# Patient Record
Sex: FEMALE | Race: WHITE | Employment: FULL TIME | URBAN - METROPOLITAN AREA
[De-identification: names, ages, dates, MRNs, and addresses within clinical notes are randomized per-mention and may not be internally consistent; named-entity substitution may affect disease eponyms.]

---

## 2017-08-10 LAB
HPV COMMENT: NORMAL
HPV TYPE 16: NOT DETECTED
HPV TYPE 18: NOT DETECTED
HPVOH (OTHER TYPES): NOT DETECTED

## 2019-01-07 LAB
ABO/RH: NORMAL
ANTIBODY SCREEN: NORMAL
HEPATITIS C ANTIBODY INTERPRETATION: NORMAL
TSH SERPL DL<=0.05 MIU/L-ACNC: 4.6 UIU/ML (ref 0.27–4.2)

## 2019-01-08 LAB
BASOPHILS ABSOLUTE: 0 K/UL (ref 0–0.2)
BASOPHILS RELATIVE PERCENT: 0.3 %
EOSINOPHILS ABSOLUTE: 0.1 K/UL (ref 0–0.6)
EOSINOPHILS RELATIVE PERCENT: 1.2 %
HCT VFR BLD CALC: 39.1 % (ref 36–48)
HEMOGLOBIN: 13.1 G/DL (ref 12–16)
HEPATITIS B SURFACE ANTIGEN INTERPRETATION: NORMAL
HIV AG/AB: NORMAL
HIV ANTIGEN: NORMAL
HIV-1 ANTIBODY: NORMAL
HIV-2 AB: NORMAL
LYMPHOCYTES ABSOLUTE: 2.7 K/UL (ref 1–5.1)
LYMPHOCYTES RELATIVE PERCENT: 28.7 %
MCH RBC QN AUTO: 31.1 PG (ref 26–34)
MCHC RBC AUTO-ENTMCNC: 33.6 G/DL (ref 31–36)
MCV RBC AUTO: 92.4 FL (ref 80–100)
MONOCYTES ABSOLUTE: 0.6 K/UL (ref 0–1.3)
MONOCYTES RELATIVE PERCENT: 6 %
NEUTROPHILS ABSOLUTE: 6.1 K/UL (ref 1.7–7.7)
NEUTROPHILS RELATIVE PERCENT: 63.8 %
PDW BLD-RTO: 12.9 % (ref 12.4–15.4)
PLATELET # BLD: 406 K/UL (ref 135–450)
PMV BLD AUTO: 8.4 FL (ref 5–10.5)
RBC # BLD: 4.23 M/UL (ref 4–5.2)
RUBELLA ANTIBODY IGG: 93.8 IU/ML
TOTAL SYPHILLIS IGG/IGM: NORMAL
URINE CULTURE, ROUTINE: NORMAL
WBC # BLD: 9.5 K/UL (ref 4–11)

## 2019-01-14 LAB — GONADOTROPIN, CHORIONIC (HCG) QUANT: 94.9 MIU/ML

## 2019-01-16 LAB — GONADOTROPIN, CHORIONIC (HCG) QUANT: 24.7 MIU/ML

## 2019-05-06 LAB
ABO/RH: NORMAL
ANTIBODY SCREEN: NORMAL
HEPATITIS C ANTIBODY INTERPRETATION: NORMAL
TSH SERPL DL<=0.05 MIU/L-ACNC: 2.41 UIU/ML (ref 0.27–4.2)

## 2019-05-07 LAB
BASOPHILS ABSOLUTE: 0 K/UL (ref 0–0.2)
BASOPHILS RELATIVE PERCENT: 0.4 %
EOSINOPHILS ABSOLUTE: 0.1 K/UL (ref 0–0.6)
EOSINOPHILS RELATIVE PERCENT: 0.8 %
HCT VFR BLD CALC: 39.5 % (ref 36–48)
HEMOGLOBIN: 13.2 G/DL (ref 12–16)
HEPATITIS B SURFACE ANTIGEN INTERPRETATION: NORMAL
LYMPHOCYTES ABSOLUTE: 3.8 K/UL (ref 1–5.1)
LYMPHOCYTES RELATIVE PERCENT: 34.7 %
MCH RBC QN AUTO: 30.7 PG (ref 26–34)
MCHC RBC AUTO-ENTMCNC: 33.3 G/DL (ref 31–36)
MCV RBC AUTO: 92 FL (ref 80–100)
MONOCYTES ABSOLUTE: 0.5 K/UL (ref 0–1.3)
MONOCYTES RELATIVE PERCENT: 4.8 %
NEUTROPHILS ABSOLUTE: 6.5 K/UL (ref 1.7–7.7)
NEUTROPHILS RELATIVE PERCENT: 59.3 %
PDW BLD-RTO: 12.6 % (ref 12.4–15.4)
PLATELET # BLD: 375 K/UL (ref 135–450)
PMV BLD AUTO: 9.4 FL (ref 5–10.5)
RBC # BLD: 4.3 M/UL (ref 4–5.2)
RUBELLA ANTIBODY IGG: 87.1 IU/ML
TOTAL SYPHILLIS IGG/IGM: NORMAL
WBC # BLD: 10.9 K/UL (ref 4–11)

## 2019-05-08 LAB — URINE CULTURE, ROUTINE: NORMAL

## 2019-05-09 LAB — MISCELLANEOUS LAB TEST ORDER: NORMAL

## 2019-06-13 LAB — GLUCOSE CHALLENGE: 158 MG/DL

## 2019-06-20 ENCOUNTER — HOSPITAL ENCOUNTER (OUTPATIENT)
Dept: OTHER | Age: 34
Discharge: HOME | End: 2019-06-20
Payer: COMMERCIAL

## 2019-06-20 LAB
GLUCOSE FASTING: 101 MG/DL
GLUCOSE TOLERANCE TEST 1 HOUR: 177 MG/DL
GLUCOSE TOLERANCE TEST 2 HOUR: 110 MG/DL
GLUCOSE TOLERANCE TEST 3 HOUR: 111 MG/DL

## 2019-06-20 PROCEDURE — 82951 GLUCOSE TOLERANCE TEST (GTT): CPT

## 2019-06-20 PROCEDURE — 36415 COLL VENOUS BLD VENIPUNCTURE: CPT

## 2019-06-20 PROCEDURE — 82952 GTT-ADDED SAMPLES: CPT

## 2019-06-27 ENCOUNTER — HOSPITAL ENCOUNTER (OUTPATIENT)
Dept: DIABETES SERVICES | Age: 34
Setting detail: THERAPIES SERIES
Discharge: HOME | End: 2019-06-27
Payer: COMMERCIAL

## 2019-06-27 VITALS — WEIGHT: 220 LBS | BODY MASS INDEX: 40.24 KG/M2

## 2019-06-27 DIAGNOSIS — O24.419 GESTATIONAL DIABETES MELLITUS (GDM), ANTEPARTUM, GESTATIONAL DIABETES METHOD OF CONTROL UNSPECIFIED: Primary | ICD-10-CM

## 2019-06-27 PROCEDURE — G0109 DIAB MANAGE TRN IND/GROUP: HCPCS | Performed by: DIETITIAN, REGISTERED

## 2019-06-27 ASSESSMENT — PATIENT HEALTH QUESTIONNAIRE - PHQ9
SUM OF ALL RESPONSES TO PHQ QUESTIONS 1-9: 0
1. LITTLE INTEREST OR PLEASURE IN DOING THINGS: 0
SUM OF ALL RESPONSES TO PHQ QUESTIONS 1-9: 0
SUM OF ALL RESPONSES TO PHQ9 QUESTIONS 1 & 2: 0
2. FEELING DOWN, DEPRESSED OR HOPELESS: 0

## 2019-07-09 LAB — TSH SERPL DL<=0.05 MIU/L-ACNC: 2.22 UIU/ML (ref 0.27–4.2)

## 2019-08-08 LAB
HCT VFR BLD CALC: 36.9 % (ref 36–48)
HEMOGLOBIN: 12.5 G/DL (ref 12–16)
MCH RBC QN AUTO: 31.2 PG (ref 26–34)
MCHC RBC AUTO-ENTMCNC: 33.9 G/DL (ref 31–36)
MCV RBC AUTO: 92.3 FL (ref 80–100)
PDW BLD-RTO: 12.8 % (ref 12.4–15.4)
PLATELET # BLD: 322 K/UL (ref 135–450)
PMV BLD AUTO: 9.4 FL (ref 5–10.5)
RBC # BLD: 4 M/UL (ref 4–5.2)
TSH SERPL DL<=0.05 MIU/L-ACNC: 2.27 UIU/ML (ref 0.27–4.2)
WBC # BLD: 10.9 K/UL (ref 4–11)

## 2019-08-09 LAB
ESTIMATED AVERAGE GLUCOSE: 102.5 MG/DL
HBA1C MFR BLD: 5.2 %

## 2019-09-14 ENCOUNTER — HOSPITAL ENCOUNTER (OUTPATIENT)
Dept: OBGYN | Age: 34
Discharge: HOME | End: 2019-09-14

## 2019-09-14 PROCEDURE — S9442 BIRTHING CLASS: HCPCS

## 2019-09-17 ENCOUNTER — HOSPITAL ENCOUNTER (OUTPATIENT)
Dept: OBGYN | Age: 34
Discharge: HOME | End: 2019-09-17

## 2019-09-17 PROCEDURE — S9444 PARENTING CLASS: HCPCS

## 2019-10-01 ENCOUNTER — HOSPITAL ENCOUNTER (OUTPATIENT)
Dept: OBGYN | Age: 34
Discharge: HOME | End: 2019-10-01

## 2019-10-01 PROCEDURE — S9444 PARENTING CLASS: HCPCS

## 2019-10-10 ENCOUNTER — HOSPITAL ENCOUNTER (OUTPATIENT)
Age: 34
Discharge: HOME | End: 2019-10-10
Attending: OBSTETRICS & GYNECOLOGY | Admitting: OBSTETRICS & GYNECOLOGY
Payer: COMMERCIAL

## 2019-10-10 VITALS
HEIGHT: 63 IN | DIASTOLIC BLOOD PRESSURE: 51 MMHG | TEMPERATURE: 97.3 F | SYSTOLIC BLOOD PRESSURE: 89 MMHG | WEIGHT: 243 LBS | OXYGEN SATURATION: 97 % | BODY MASS INDEX: 43.05 KG/M2 | HEART RATE: 83 BPM | RESPIRATION RATE: 18 BRPM

## 2019-10-10 LAB
A/G RATIO: 1.2 (ref 1.1–2.2)
ALBUMIN SERPL-MCNC: 3.7 G/DL (ref 3.4–5)
ALP BLD-CCNC: 97 U/L (ref 40–129)
ALT SERPL-CCNC: 11 U/L (ref 10–40)
ANION GAP SERPL CALCULATED.3IONS-SCNC: 10 MMOL/L (ref 3–16)
AST SERPL-CCNC: 13 U/L (ref 15–37)
BACTERIA: ABNORMAL /HPF
BASOPHILS ABSOLUTE: 0.1 K/UL (ref 0–0.2)
BASOPHILS RELATIVE PERCENT: 0.7 %
BILIRUB SERPL-MCNC: <0.2 MG/DL (ref 0–1)
BILIRUBIN DIRECT: <0.2 MG/DL (ref 0–0.3)
BILIRUBIN URINE: NEGATIVE
BILIRUBIN, INDIRECT: NORMAL MG/DL (ref 0–1)
BLOOD, URINE: NEGATIVE
BUN BLDV-MCNC: 8 MG/DL (ref 7–20)
CALCIUM SERPL-MCNC: 9 MG/DL (ref 8.3–10.6)
CHLORIDE BLD-SCNC: 105 MMOL/L (ref 99–110)
CLARITY: CLEAR
CO2: 23 MMOL/L (ref 21–32)
COLOR: YELLOW
CREAT SERPL-MCNC: <0.5 MG/DL (ref 0.6–1.1)
EOSINOPHILS ABSOLUTE: 0.2 K/UL (ref 0–0.6)
EOSINOPHILS RELATIVE PERCENT: 0.9 %
EPITHELIAL CELLS, UA: 2 /HPF (ref 0–5)
GFR AFRICAN AMERICAN: >60
GFR NON-AFRICAN AMERICAN: >60
GLOBULIN: 3.2 G/DL
GLUCOSE BLD-MCNC: 76 MG/DL (ref 70–99)
GLUCOSE URINE: NEGATIVE MG/DL
HCT VFR BLD CALC: 35.4 % (ref 36–48)
HEMOGLOBIN: 12 G/DL (ref 12–16)
HYALINE CASTS: 0 /LPF (ref 0–8)
KETONES, URINE: NEGATIVE MG/DL
LEUKOCYTE ESTERASE, URINE: ABNORMAL
LYMPHOCYTES ABSOLUTE: 3.3 K/UL (ref 1–5.1)
LYMPHOCYTES RELATIVE PERCENT: 19.6 %
MCH RBC QN AUTO: 30.5 PG (ref 26–34)
MCHC RBC AUTO-ENTMCNC: 33.8 G/DL (ref 31–36)
MCV RBC AUTO: 90 FL (ref 80–100)
MICROSCOPIC EXAMINATION: YES
MONOCYTES ABSOLUTE: 0.9 K/UL (ref 0–1.3)
MONOCYTES RELATIVE PERCENT: 5.5 %
NEUTROPHILS ABSOLUTE: 12.2 K/UL (ref 1.7–7.7)
NEUTROPHILS RELATIVE PERCENT: 73.3 %
NITRITE, URINE: NEGATIVE
PDW BLD-RTO: 13.4 % (ref 12.4–15.4)
PH UA: 7 (ref 5–8)
PLATELET # BLD: 324 K/UL (ref 135–450)
PMV BLD AUTO: 8.8 FL (ref 5–10.5)
POTASSIUM SERPL-SCNC: 4 MMOL/L (ref 3.5–5.1)
PROTEIN UA: NEGATIVE MG/DL
RBC # BLD: 3.93 M/UL (ref 4–5.2)
RBC UA: 4 /HPF (ref 0–4)
SODIUM BLD-SCNC: 138 MMOL/L (ref 136–145)
SPECIFIC GRAVITY UA: 1.01 (ref 1–1.03)
TOTAL PROTEIN: 6.9 G/DL (ref 6.4–8.2)
URIC ACID, SERUM: 4.4 MG/DL (ref 2.6–6)
URINE TYPE: 3
UROBILINOGEN, URINE: 0.2 E.U./DL
WBC # BLD: 16.7 K/UL (ref 4–11)
WBC UA: 4 /HPF (ref 0–5)

## 2019-10-10 PROCEDURE — 99211 OFF/OP EST MAY X REQ PHY/QHP: CPT

## 2019-10-10 PROCEDURE — 82248 BILIRUBIN DIRECT: CPT

## 2019-10-10 PROCEDURE — 59025 FETAL NON-STRESS TEST: CPT

## 2019-10-10 PROCEDURE — 80053 COMPREHEN METABOLIC PANEL: CPT

## 2019-10-10 PROCEDURE — 84550 ASSAY OF BLOOD/URIC ACID: CPT

## 2019-10-10 PROCEDURE — 85025 COMPLETE CBC W/AUTO DIFF WBC: CPT

## 2019-10-10 PROCEDURE — 81001 URINALYSIS AUTO W/SCOPE: CPT

## 2019-10-22 ENCOUNTER — HOSPITAL ENCOUNTER (OUTPATIENT)
Dept: OBGYN | Age: 34
Discharge: HOME | End: 2019-10-22

## 2019-10-22 PROCEDURE — S9447 INFANT SAFETY CLASS: HCPCS

## 2019-10-29 LAB — TSH SERPL DL<=0.05 MIU/L-ACNC: 1.38 UIU/ML (ref 0.27–4.2)

## 2019-12-04 LAB — GBS, EXTERNAL RESULT: POSITIVE

## 2019-12-19 ENCOUNTER — ANESTHESIA (OUTPATIENT)
Dept: LABOR AND DELIVERY | Age: 34
DRG: 833 | End: 2019-12-19
Payer: COMMERCIAL

## 2019-12-19 ENCOUNTER — ANESTHESIA EVENT (OUTPATIENT)
Dept: LABOR AND DELIVERY | Age: 34
DRG: 833 | End: 2019-12-19
Payer: COMMERCIAL

## 2019-12-19 ENCOUNTER — HOSPITAL ENCOUNTER (INPATIENT)
Age: 34
LOS: 1 days | Discharge: HOME | DRG: 833 | End: 2019-12-20
Attending: OBSTETRICS & GYNECOLOGY | Admitting: OBSTETRICS & GYNECOLOGY
Payer: COMMERCIAL

## 2019-12-19 PROBLEM — Z3A.39 39 WEEKS GESTATION OF PREGNANCY: Status: ACTIVE | Noted: 2019-12-19

## 2019-12-19 LAB
ABO/RH: NORMAL
AMPHETAMINE SCREEN, URINE: NORMAL
ANION GAP SERPL CALCULATED.3IONS-SCNC: 14 MMOL/L (ref 3–16)
ANTIBODY SCREEN: NORMAL
BARBITURATE SCREEN URINE: NORMAL
BASOPHILS ABSOLUTE: 0.1 K/UL (ref 0–0.2)
BASOPHILS RELATIVE PERCENT: 0.4 %
BENZODIAZEPINE SCREEN, URINE: NORMAL
BUN BLDV-MCNC: 9 MG/DL (ref 7–20)
BUPRENORPHINE URINE: NORMAL
CALCIUM SERPL-MCNC: 8.9 MG/DL (ref 8.3–10.6)
CANNABINOID SCREEN URINE: NORMAL
CHLORIDE BLD-SCNC: 104 MMOL/L (ref 99–110)
CO2: 20 MMOL/L (ref 21–32)
COCAINE METABOLITE SCREEN URINE: NORMAL
CREAT SERPL-MCNC: 0.6 MG/DL (ref 0.6–1.1)
EOSINOPHILS ABSOLUTE: 0.1 K/UL (ref 0–0.6)
EOSINOPHILS RELATIVE PERCENT: 0.8 %
GFR AFRICAN AMERICAN: >60
GFR NON-AFRICAN AMERICAN: >60
GLUCOSE BLD-MCNC: 126 MG/DL (ref 70–99)
HCT VFR BLD CALC: 35.4 % (ref 36–48)
HEMOGLOBIN: 11.8 G/DL (ref 12–16)
LYMPHOCYTES ABSOLUTE: 3.3 K/UL (ref 1–5.1)
LYMPHOCYTES RELATIVE PERCENT: 23 %
Lab: NORMAL
MCH RBC QN AUTO: 30.4 PG (ref 26–34)
MCHC RBC AUTO-ENTMCNC: 33.5 G/DL (ref 31–36)
MCV RBC AUTO: 90.9 FL (ref 80–100)
METHADONE SCREEN, URINE: NORMAL
MONOCYTES ABSOLUTE: 0.6 K/UL (ref 0–1.3)
MONOCYTES RELATIVE PERCENT: 4.4 %
NEUTROPHILS ABSOLUTE: 10.2 K/UL (ref 1.7–7.7)
NEUTROPHILS RELATIVE PERCENT: 71.4 %
OPIATE SCREEN URINE: NORMAL
OXYCODONE URINE: NORMAL
PDW BLD-RTO: 13.8 % (ref 12.4–15.4)
PH UA: 5
PHENCYCLIDINE SCREEN URINE: NORMAL
PLATELET # BLD: 301 K/UL (ref 135–450)
PMV BLD AUTO: 10.4 FL (ref 5–10.5)
POTASSIUM SERPL-SCNC: 3.8 MMOL/L (ref 3.5–5.1)
PROPOXYPHENE SCREEN: NORMAL
RBC # BLD: 3.89 M/UL (ref 4–5.2)
SODIUM BLD-SCNC: 138 MMOL/L (ref 136–145)
WBC # BLD: 14.2 K/UL (ref 4–11)

## 2019-12-19 PROCEDURE — 86901 BLOOD TYPING SEROLOGIC RH(D): CPT

## 2019-12-19 PROCEDURE — 6370000000 HC RX 637 (ALT 250 FOR IP): Performed by: OBSTETRICS & GYNECOLOGY

## 2019-12-19 PROCEDURE — 86850 RBC ANTIBODY SCREEN: CPT

## 2019-12-19 PROCEDURE — 85025 COMPLETE CBC W/AUTO DIFF WBC: CPT

## 2019-12-19 PROCEDURE — G0378 HOSPITAL OBSERVATION PER HR: HCPCS

## 2019-12-19 PROCEDURE — 3700000025 EPIDURAL BLOCK: Performed by: FAMILY MEDICINE

## 2019-12-19 PROCEDURE — 86780 TREPONEMA PALLIDUM: CPT

## 2019-12-19 PROCEDURE — 3E0P7VZ INTRODUCTION OF HORMONE INTO FEMALE REPRODUCTIVE, VIA NATURAL OR ARTIFICIAL OPENING: ICD-10-PCS | Performed by: OBSTETRICS & GYNECOLOGY

## 2019-12-19 PROCEDURE — 80048 BASIC METABOLIC PNL TOTAL CA: CPT

## 2019-12-19 PROCEDURE — 2580000003 HC RX 258: Performed by: OBSTETRICS & GYNECOLOGY

## 2019-12-19 PROCEDURE — 86900 BLOOD TYPING SEROLOGIC ABO: CPT

## 2019-12-19 PROCEDURE — 1220000000 HC SEMI PRIVATE OB R&B

## 2019-12-19 PROCEDURE — 80307 DRUG TEST PRSMV CHEM ANLYZR: CPT

## 2019-12-19 RX ORDER — SODIUM CHLORIDE, SODIUM LACTATE, POTASSIUM CHLORIDE, CALCIUM CHLORIDE 600; 310; 30; 20 MG/100ML; MG/100ML; MG/100ML; MG/100ML
INJECTION, SOLUTION INTRAVENOUS CONTINUOUS
Status: DISCONTINUED | OUTPATIENT
Start: 2019-12-19 | End: 2019-12-20

## 2019-12-19 RX ORDER — ACETAMINOPHEN 325 MG/1
650 TABLET ORAL EVERY 4 HOURS PRN
Status: DISCONTINUED | OUTPATIENT
Start: 2019-12-19 | End: 2019-12-21 | Stop reason: HOSPADM

## 2019-12-19 RX ORDER — ONDANSETRON 2 MG/ML
4 INJECTION INTRAMUSCULAR; INTRAVENOUS EVERY 6 HOURS PRN
Status: DISCONTINUED | OUTPATIENT
Start: 2019-12-19 | End: 2019-12-21 | Stop reason: HOSPADM

## 2019-12-19 RX ORDER — DOCUSATE SODIUM 100 MG/1
100 CAPSULE, LIQUID FILLED ORAL 2 TIMES DAILY
Status: DISCONTINUED | OUTPATIENT
Start: 2019-12-19 | End: 2019-12-21 | Stop reason: HOSPADM

## 2019-12-19 RX ORDER — SODIUM CHLORIDE 0.9 % (FLUSH) 0.9 %
10 SYRINGE (ML) INJECTION PRN
Status: DISCONTINUED | OUTPATIENT
Start: 2019-12-19 | End: 2019-12-21 | Stop reason: HOSPADM

## 2019-12-19 RX ORDER — SODIUM CHLORIDE 0.9 % (FLUSH) 0.9 %
10 SYRINGE (ML) INJECTION EVERY 12 HOURS SCHEDULED
Status: DISCONTINUED | OUTPATIENT
Start: 2019-12-19 | End: 2019-12-21 | Stop reason: HOSPADM

## 2019-12-19 RX ADMIN — SODIUM CHLORIDE, POTASSIUM CHLORIDE, SODIUM LACTATE AND CALCIUM CHLORIDE: 600; 310; 30; 20 INJECTION, SOLUTION INTRAVENOUS at 21:00

## 2019-12-19 RX ADMIN — Medication 25 MCG: at 21:55

## 2019-12-20 VITALS
DIASTOLIC BLOOD PRESSURE: 56 MMHG | TEMPERATURE: 98.4 F | HEIGHT: 63 IN | BODY MASS INDEX: 46.42 KG/M2 | SYSTOLIC BLOOD PRESSURE: 113 MMHG | HEART RATE: 87 BPM | RESPIRATION RATE: 20 BRPM | WEIGHT: 262 LBS

## 2019-12-20 LAB
GLUCOSE BLD-MCNC: 103 MG/DL (ref 70–99)
GLUCOSE BLD-MCNC: 78 MG/DL (ref 70–99)
GLUCOSE BLD-MCNC: 90 MG/DL (ref 70–99)
PERFORMED ON: ABNORMAL
PERFORMED ON: NORMAL
PERFORMED ON: NORMAL
TOTAL SYPHILLIS IGG/IGM: NORMAL

## 2019-12-20 PROCEDURE — 6370000000 HC RX 637 (ALT 250 FOR IP): Performed by: OBSTETRICS & GYNECOLOGY

## 2019-12-20 PROCEDURE — G0378 HOSPITAL OBSERVATION PER HR: HCPCS

## 2019-12-20 PROCEDURE — 6360000002 HC RX W HCPCS: Performed by: OBSTETRICS & GYNECOLOGY

## 2019-12-20 PROCEDURE — 96365 THER/PROPH/DIAG IV INF INIT: CPT

## 2019-12-20 PROCEDURE — 96366 THER/PROPH/DIAG IV INF ADDON: CPT

## 2019-12-20 PROCEDURE — 2580000003 HC RX 258: Performed by: OBSTETRICS & GYNECOLOGY

## 2019-12-20 RX ORDER — SODIUM CHLORIDE 9 MG/ML
INJECTION, SOLUTION INTRAVENOUS CONTINUOUS
Status: DISCONTINUED | OUTPATIENT
Start: 2019-12-20 | End: 2019-12-21 | Stop reason: HOSPADM

## 2019-12-20 RX ORDER — NALBUPHINE HCL 10 MG/ML
10 AMPUL (ML) INJECTION
Status: DISCONTINUED | OUTPATIENT
Start: 2019-12-20 | End: 2019-12-21 | Stop reason: HOSPADM

## 2019-12-20 RX ADMIN — SODIUM CHLORIDE: 9 INJECTION, SOLUTION INTRAVENOUS at 10:36

## 2019-12-20 RX ADMIN — Medication 25 MCG: at 03:03

## 2019-12-20 RX ADMIN — NALBUPHINE HYDROCHLORIDE 10 MG: 10 INJECTION, SOLUTION INTRAMUSCULAR; INTRAVENOUS; SUBCUTANEOUS at 01:43

## 2019-12-20 RX ADMIN — Medication 50 MCG: at 09:20

## 2019-12-20 RX ADMIN — DEXTROSE MONOHYDRATE 2.5 MILLION UNITS: 50 INJECTION, SOLUTION INTRAVENOUS at 17:59

## 2019-12-20 RX ADMIN — DEXTROSE MONOHYDRATE 2.5 MILLION UNITS: 50 INJECTION, SOLUTION INTRAVENOUS at 06:59

## 2019-12-20 RX ADMIN — DEXTROSE MONOHYDRATE 5 MILLION UNITS: 5 INJECTION INTRAVENOUS at 03:14

## 2019-12-20 RX ADMIN — DEXTROSE MONOHYDRATE 2.5 MILLION UNITS: 50 INJECTION, SOLUTION INTRAVENOUS at 13:30

## 2019-12-20 RX ADMIN — SODIUM CHLORIDE: 9 INJECTION, SOLUTION INTRAVENOUS at 01:21

## 2019-12-20 RX ADMIN — Medication 50 MCG: at 14:36

## 2019-12-20 RX ADMIN — ACETAMINOPHEN 650 MG: 325 TABLET, FILM COATED ORAL at 00:49

## 2019-12-20 ASSESSMENT — PAIN SCALES - GENERAL
PAINLEVEL_OUTOF10: 6
PAINLEVEL_OUTOF10: 3

## 2019-12-20 ASSESSMENT — PAIN DESCRIPTION - DESCRIPTORS
DESCRIPTORS: CRAMPING
DESCRIPTORS: CRAMPING;STABBING
DESCRIPTORS: CRAMPING

## 2019-12-21 ENCOUNTER — HOSPITAL ENCOUNTER (INPATIENT)
Age: 34
LOS: 2 days | Discharge: HOME | End: 2019-12-23
Attending: OBSTETRICS & GYNECOLOGY | Admitting: OBSTETRICS & GYNECOLOGY
Payer: COMMERCIAL

## 2019-12-21 DIAGNOSIS — Z37.9 NORMAL LABOR: Primary | ICD-10-CM

## 2019-12-21 LAB
AMPHETAMINE SCREEN, URINE: NORMAL
BARBITURATE SCREEN URINE: NORMAL
BENZODIAZEPINE SCREEN, URINE: NORMAL
BUPRENORPHINE URINE: NORMAL
CANNABINOID SCREEN URINE: NORMAL
COCAINE METABOLITE SCREEN URINE: NORMAL
GLUCOSE BLD-MCNC: 102 MG/DL (ref 70–99)
GLUCOSE BLD-MCNC: 86 MG/DL (ref 70–99)
GLUCOSE BLD-MCNC: 90 MG/DL (ref 70–99)
GLUCOSE BLD-MCNC: 90 MG/DL (ref 70–99)
GLUCOSE BLD-MCNC: 92 MG/DL (ref 70–99)
HCT VFR BLD CALC: 37.3 % (ref 36–48)
HEMOGLOBIN: 12.6 G/DL (ref 12–16)
Lab: NORMAL
MCH RBC QN AUTO: 30.5 PG (ref 26–34)
MCHC RBC AUTO-ENTMCNC: 33.8 G/DL (ref 31–36)
MCV RBC AUTO: 90.1 FL (ref 80–100)
METHADONE SCREEN, URINE: NORMAL
OPIATE SCREEN URINE: NORMAL
OXYCODONE URINE: NORMAL
PDW BLD-RTO: 13.6 % (ref 12.4–15.4)
PERFORMED ON: ABNORMAL
PERFORMED ON: NORMAL
PH UA: 5
PHENCYCLIDINE SCREEN URINE: NORMAL
PLATELET # BLD: 321 K/UL (ref 135–450)
PMV BLD AUTO: 10 FL (ref 5–10.5)
PROPOXYPHENE SCREEN: NORMAL
RBC # BLD: 4.14 M/UL (ref 4–5.2)
SPECIMEN STATUS: NORMAL
TOTAL SYPHILLIS IGG/IGM: NORMAL
WBC # BLD: 19 K/UL (ref 4–11)

## 2019-12-21 PROCEDURE — 1220000000 HC SEMI PRIVATE OB R&B

## 2019-12-21 PROCEDURE — 86780 TREPONEMA PALLIDUM: CPT

## 2019-12-21 PROCEDURE — 3700000025 EPIDURAL BLOCK: Performed by: FAMILY MEDICINE

## 2019-12-21 PROCEDURE — 2500000003 HC RX 250 WO HCPCS: Performed by: NURSE ANESTHETIST, CERTIFIED REGISTERED

## 2019-12-21 PROCEDURE — 2580000003 HC RX 258: Performed by: OBSTETRICS & GYNECOLOGY

## 2019-12-21 PROCEDURE — 80307 DRUG TEST PRSMV CHEM ANLYZR: CPT

## 2019-12-21 PROCEDURE — 6370000000 HC RX 637 (ALT 250 FOR IP): Performed by: OBSTETRICS & GYNECOLOGY

## 2019-12-21 PROCEDURE — 6360000002 HC RX W HCPCS: Performed by: OBSTETRICS & GYNECOLOGY

## 2019-12-21 PROCEDURE — 85027 COMPLETE CBC AUTOMATED: CPT

## 2019-12-21 RX ORDER — DOCUSATE SODIUM 100 MG/1
100 CAPSULE, LIQUID FILLED ORAL 2 TIMES DAILY
Status: DISCONTINUED | OUTPATIENT
Start: 2019-12-21 | End: 2019-12-22

## 2019-12-21 RX ORDER — ONDANSETRON 2 MG/ML
4 INJECTION INTRAMUSCULAR; INTRAVENOUS EVERY 6 HOURS PRN
Status: DISCONTINUED | OUTPATIENT
Start: 2019-12-21 | End: 2019-12-22

## 2019-12-21 RX ORDER — LIDOCAINE HYDROCHLORIDE 10 MG/ML
INJECTION, SOLUTION EPIDURAL; INFILTRATION; INTRACAUDAL; PERINEURAL PRN
Status: DISCONTINUED | OUTPATIENT
Start: 2019-12-21 | End: 2019-12-22 | Stop reason: SDUPTHER

## 2019-12-21 RX ORDER — LIDOCAINE HYDROCHLORIDE AND EPINEPHRINE 20; 5 MG/ML; UG/ML
INJECTION, SOLUTION EPIDURAL; INFILTRATION; INTRACAUDAL; PERINEURAL PRN
Status: DISCONTINUED | OUTPATIENT
Start: 2019-12-21 | End: 2019-12-22 | Stop reason: SDUPTHER

## 2019-12-21 RX ORDER — SODIUM CHLORIDE, SODIUM LACTATE, POTASSIUM CHLORIDE, CALCIUM CHLORIDE 600; 310; 30; 20 MG/100ML; MG/100ML; MG/100ML; MG/100ML
INJECTION, SOLUTION INTRAVENOUS CONTINUOUS
Status: DISCONTINUED | OUTPATIENT
Start: 2019-12-21 | End: 2019-12-22

## 2019-12-21 RX ORDER — LIDOCAINE HYDROCHLORIDE 20 MG/ML
JELLY TOPICAL PRN
Status: DISCONTINUED | OUTPATIENT
Start: 2019-12-21 | End: 2019-12-22

## 2019-12-21 RX ORDER — ACETAMINOPHEN 325 MG/1
650 TABLET ORAL EVERY 4 HOURS PRN
Status: DISCONTINUED | OUTPATIENT
Start: 2019-12-21 | End: 2019-12-22

## 2019-12-21 RX ADMIN — DEXTROSE MONOHYDRATE 2.5 MILLION UNITS: 50 INJECTION, SOLUTION INTRAVENOUS at 14:50

## 2019-12-21 RX ADMIN — LIDOCAINE HYDROCHLORIDE AND EPINEPHRINE 3 ML: 20; 5 INJECTION, SOLUTION EPIDURAL; INFILTRATION; INTRACAUDAL; PERINEURAL at 01:17

## 2019-12-21 RX ADMIN — DEXTROSE MONOHYDRATE 2.5 MILLION UNITS: 50 INJECTION, SOLUTION INTRAVENOUS at 18:33

## 2019-12-21 RX ADMIN — LIDOCAINE HYDROCHLORIDE 3 ML: 10 INJECTION, SOLUTION EPIDURAL; INFILTRATION; INTRACAUDAL; PERINEURAL at 01:17

## 2019-12-21 RX ADMIN — DEXTROSE MONOHYDRATE 2.5 MILLION UNITS: 50 INJECTION, SOLUTION INTRAVENOUS at 23:19

## 2019-12-21 RX ADMIN — Medication 12 ML/HR: at 01:17

## 2019-12-21 RX ADMIN — ACETAMINOPHEN 650 MG: 325 TABLET, FILM COATED ORAL at 05:01

## 2019-12-21 RX ADMIN — ONDANSETRON 4 MG: 2 INJECTION INTRAMUSCULAR; INTRAVENOUS at 03:27

## 2019-12-21 RX ADMIN — LIDOCAINE HYDROCHLORIDE: 20 JELLY TOPICAL at 03:28

## 2019-12-21 RX ADMIN — SODIUM CHLORIDE, POTASSIUM CHLORIDE, SODIUM LACTATE AND CALCIUM CHLORIDE: 600; 310; 30; 20 INJECTION, SOLUTION INTRAVENOUS at 14:51

## 2019-12-21 RX ADMIN — SODIUM CHLORIDE, POTASSIUM CHLORIDE, SODIUM LACTATE AND CALCIUM CHLORIDE: 600; 310; 30; 20 INJECTION, SOLUTION INTRAVENOUS at 03:28

## 2019-12-21 RX ADMIN — DEXTROSE MONOHYDRATE 2.5 MILLION UNITS: 50 INJECTION, SOLUTION INTRAVENOUS at 06:44

## 2019-12-21 RX ADMIN — SODIUM CHLORIDE, POTASSIUM CHLORIDE, SODIUM LACTATE AND CALCIUM CHLORIDE: 600; 310; 30; 20 INJECTION, SOLUTION INTRAVENOUS at 05:02

## 2019-12-21 RX ADMIN — Medication 12 ML/HR: at 19:06

## 2019-12-21 RX ADMIN — Medication 1 MILLI-UNITS/MIN: at 09:46

## 2019-12-21 RX ADMIN — DEXTROSE MONOHYDRATE 2.5 MILLION UNITS: 50 INJECTION, SOLUTION INTRAVENOUS at 10:43

## 2019-12-21 ASSESSMENT — PAIN SCALES - GENERAL: PAINLEVEL_OUTOF10: 3

## 2019-12-22 PROBLEM — Z37.9 NORMAL LABOR: Status: ACTIVE | Noted: 2019-12-22

## 2019-12-22 PROCEDURE — 7200000001 HC VAGINAL DELIVERY

## 2019-12-22 PROCEDURE — 2580000003 HC RX 258: Performed by: OBSTETRICS & GYNECOLOGY

## 2019-12-22 PROCEDURE — 6370000000 HC RX 637 (ALT 250 FOR IP)

## 2019-12-22 PROCEDURE — 1220000000 HC SEMI PRIVATE OB R&B

## 2019-12-22 PROCEDURE — 6370000000 HC RX 637 (ALT 250 FOR IP): Performed by: OBSTETRICS & GYNECOLOGY

## 2019-12-22 PROCEDURE — 0KQM0ZZ REPAIR PERINEUM MUSCLE, OPEN APPROACH: ICD-10-PCS | Performed by: OBSTETRICS & GYNECOLOGY

## 2019-12-22 PROCEDURE — 6360000002 HC RX W HCPCS: Performed by: OBSTETRICS & GYNECOLOGY

## 2019-12-22 RX ORDER — SODIUM CHLORIDE 0.9 % (FLUSH) 0.9 %
10 SYRINGE (ML) INJECTION PRN
Status: DISCONTINUED | OUTPATIENT
Start: 2019-12-22 | End: 2019-12-23 | Stop reason: HOSPADM

## 2019-12-22 RX ORDER — OXYCODONE HYDROCHLORIDE AND ACETAMINOPHEN 5; 325 MG/1; MG/1
2 TABLET ORAL EVERY 4 HOURS PRN
Status: DISCONTINUED | OUTPATIENT
Start: 2019-12-22 | End: 2019-12-23 | Stop reason: HOSPADM

## 2019-12-22 RX ORDER — OXYCODONE HYDROCHLORIDE AND ACETAMINOPHEN 5; 325 MG/1; MG/1
1 TABLET ORAL EVERY 6 HOURS PRN
Qty: 20 TABLET | Refills: 0 | Status: SHIPPED | OUTPATIENT
Start: 2019-12-22 | End: 2019-12-27

## 2019-12-22 RX ORDER — DOCUSATE SODIUM 100 MG/1
100 CAPSULE, LIQUID FILLED ORAL 2 TIMES DAILY
Status: DISCONTINUED | OUTPATIENT
Start: 2019-12-22 | End: 2019-12-23 | Stop reason: HOSPADM

## 2019-12-22 RX ORDER — MISOPROSTOL 100 UG/1
TABLET ORAL
Status: COMPLETED
Start: 2019-12-22 | End: 2019-12-22

## 2019-12-22 RX ORDER — IBUPROFEN 800 MG/1
800 TABLET ORAL EVERY 8 HOURS
Status: DISCONTINUED | OUTPATIENT
Start: 2019-12-22 | End: 2019-12-23 | Stop reason: HOSPADM

## 2019-12-22 RX ORDER — FUROSEMIDE 10 MG/ML
20 INJECTION INTRAMUSCULAR; INTRAVENOUS ONCE
Status: DISCONTINUED | OUTPATIENT
Start: 2019-12-22 | End: 2019-12-23 | Stop reason: HOSPADM

## 2019-12-22 RX ORDER — SODIUM CHLORIDE 0.9 % (FLUSH) 0.9 %
10 SYRINGE (ML) INJECTION EVERY 12 HOURS SCHEDULED
Status: DISCONTINUED | OUTPATIENT
Start: 2019-12-22 | End: 2019-12-23 | Stop reason: HOSPADM

## 2019-12-22 RX ORDER — ACETAMINOPHEN 325 MG/1
650 TABLET ORAL EVERY 4 HOURS PRN
Status: DISCONTINUED | OUTPATIENT
Start: 2019-12-22 | End: 2019-12-23 | Stop reason: HOSPADM

## 2019-12-22 RX ORDER — LEVOTHYROXINE SODIUM 0.03 MG/1
50 TABLET ORAL DAILY
Status: DISCONTINUED | OUTPATIENT
Start: 2019-12-22 | End: 2019-12-23 | Stop reason: HOSPADM

## 2019-12-22 RX ORDER — LANOLIN 100 %
OINTMENT (GRAM) TOPICAL PRN
Status: DISCONTINUED | OUTPATIENT
Start: 2019-12-22 | End: 2019-12-23 | Stop reason: HOSPADM

## 2019-12-22 RX ORDER — MISOPROSTOL 100 UG/1
800 TABLET ORAL ONCE
Status: COMPLETED | OUTPATIENT
Start: 2019-12-22 | End: 2019-12-22

## 2019-12-22 RX ORDER — FUROSEMIDE 20 MG/1
20 TABLET ORAL ONCE
Status: COMPLETED | OUTPATIENT
Start: 2019-12-22 | End: 2019-12-22

## 2019-12-22 RX ORDER — OXYCODONE HYDROCHLORIDE AND ACETAMINOPHEN 5; 325 MG/1; MG/1
1 TABLET ORAL EVERY 4 HOURS PRN
Status: DISCONTINUED | OUTPATIENT
Start: 2019-12-22 | End: 2019-12-23 | Stop reason: HOSPADM

## 2019-12-22 RX ORDER — IBUPROFEN 600 MG/1
600 TABLET ORAL EVERY 6 HOURS PRN
Qty: 30 TABLET | Refills: 1 | Status: SHIPPED | OUTPATIENT
Start: 2019-12-22 | End: 2020-04-20

## 2019-12-22 RX ADMIN — DOCUSATE SODIUM 100 MG: 100 CAPSULE ORAL at 07:45

## 2019-12-22 RX ADMIN — MISOPROSTOL 800 MCG: 100 TABLET ORAL at 02:40

## 2019-12-22 RX ADMIN — IBUPROFEN 800 MG: 800 TABLET, FILM COATED ORAL at 11:23

## 2019-12-22 RX ADMIN — BENZOCAINE AND LEVOMENTHOL: 200; 5 SPRAY TOPICAL at 03:36

## 2019-12-22 RX ADMIN — SODIUM CHLORIDE, POTASSIUM CHLORIDE, SODIUM LACTATE AND CALCIUM CHLORIDE: 600; 310; 30; 20 INJECTION, SOLUTION INTRAVENOUS at 00:52

## 2019-12-22 RX ADMIN — ACETAMINOPHEN 650 MG: 325 TABLET, FILM COATED ORAL at 09:36

## 2019-12-22 RX ADMIN — OXYCODONE HYDROCHLORIDE AND ACETAMINOPHEN 1 TABLET: 5; 325 TABLET ORAL at 17:48

## 2019-12-22 RX ADMIN — OXYCODONE HYDROCHLORIDE AND ACETAMINOPHEN 1 TABLET: 5; 325 TABLET ORAL at 05:08

## 2019-12-22 RX ADMIN — LEVOTHYROXINE SODIUM 50 MCG: 0.03 TABLET ORAL at 07:45

## 2019-12-22 RX ADMIN — IBUPROFEN 800 MG: 800 TABLET, FILM COATED ORAL at 03:36

## 2019-12-22 RX ADMIN — ACETAMINOPHEN 650 MG: 325 TABLET, FILM COATED ORAL at 13:48

## 2019-12-22 RX ADMIN — FUROSEMIDE 20 MG: 20 TABLET ORAL at 15:23

## 2019-12-22 ASSESSMENT — PAIN SCALES - GENERAL
PAINLEVEL_OUTOF10: 4
PAINLEVEL_OUTOF10: 5
PAINLEVEL_OUTOF10: 8
PAINLEVEL_OUTOF10: 3
PAINLEVEL_OUTOF10: 5
PAINLEVEL_OUTOF10: 7

## 2019-12-23 VITALS
DIASTOLIC BLOOD PRESSURE: 88 MMHG | OXYGEN SATURATION: 100 % | SYSTOLIC BLOOD PRESSURE: 127 MMHG | RESPIRATION RATE: 18 BRPM | TEMPERATURE: 97.1 F | WEIGHT: 263 LBS | BODY MASS INDEX: 48.4 KG/M2 | HEART RATE: 73 BPM | HEIGHT: 62 IN

## 2019-12-23 PROCEDURE — 2500000003 HC RX 250 WO HCPCS

## 2019-12-23 PROCEDURE — 6370000000 HC RX 637 (ALT 250 FOR IP): Performed by: OBSTETRICS & GYNECOLOGY

## 2019-12-23 RX ADMIN — DOCUSATE SODIUM 100 MG: 100 CAPSULE ORAL at 10:05

## 2019-12-23 RX ADMIN — LEVOTHYROXINE SODIUM 50 MCG: 0.03 TABLET ORAL at 07:10

## 2019-12-23 RX ADMIN — BENZOCAINE AND LEVOMENTHOL: 200; 5 SPRAY TOPICAL at 12:38

## 2019-12-23 RX ADMIN — OXYCODONE HYDROCHLORIDE AND ACETAMINOPHEN 1 TABLET: 5; 325 TABLET ORAL at 01:52

## 2019-12-23 RX ADMIN — IBUPROFEN 800 MG: 800 TABLET, FILM COATED ORAL at 10:05

## 2019-12-23 RX ADMIN — IBUPROFEN 800 MG: 800 TABLET, FILM COATED ORAL at 01:51

## 2019-12-23 ASSESSMENT — PAIN SCALES - GENERAL
PAINLEVEL_OUTOF10: 7
PAINLEVEL_OUTOF10: 4

## 2019-12-27 ENCOUNTER — APPOINTMENT (OUTPATIENT)
Dept: ULTRASOUND IMAGING | Age: 34
DRG: 776 | End: 2019-12-27
Payer: COMMERCIAL

## 2019-12-27 ENCOUNTER — HOSPITAL ENCOUNTER (INPATIENT)
Age: 34
LOS: 2 days | Discharge: HOME | DRG: 776 | End: 2019-12-29
Attending: EMERGENCY MEDICINE | Admitting: OBSTETRICS & GYNECOLOGY
Payer: COMMERCIAL

## 2019-12-27 DIAGNOSIS — R10.10 PAIN OF UPPER ABDOMEN: Primary | ICD-10-CM

## 2019-12-27 DIAGNOSIS — R51.9 ACUTE NONINTRACTABLE HEADACHE, UNSPECIFIED HEADACHE TYPE: ICD-10-CM

## 2019-12-27 DIAGNOSIS — R03.0 ELEVATED BLOOD PRESSURE READING: ICD-10-CM

## 2019-12-27 LAB
A/G RATIO: 1.3 (ref 1.1–2.2)
ALBUMIN SERPL-MCNC: 3.5 G/DL (ref 3.4–5)
ALP BLD-CCNC: 113 U/L (ref 40–129)
ALT SERPL-CCNC: 50 U/L (ref 10–40)
ANION GAP SERPL CALCULATED.3IONS-SCNC: 12 MMOL/L (ref 3–16)
AST SERPL-CCNC: 50 U/L (ref 15–37)
BACTERIA: ABNORMAL /HPF
BASOPHILS ABSOLUTE: 0.1 K/UL (ref 0–0.2)
BASOPHILS RELATIVE PERCENT: 0.6 %
BILIRUB SERPL-MCNC: 0.3 MG/DL (ref 0–1)
BILIRUBIN URINE: NEGATIVE
BLOOD, URINE: ABNORMAL
BUN BLDV-MCNC: 13 MG/DL (ref 7–20)
CALCIUM SERPL-MCNC: 8.6 MG/DL (ref 8.3–10.6)
CHLORIDE BLD-SCNC: 106 MMOL/L (ref 99–110)
CLARITY: ABNORMAL
CO2: 23 MMOL/L (ref 21–32)
COLOR: ABNORMAL
CREAT SERPL-MCNC: 0.8 MG/DL (ref 0.6–1.1)
EOSINOPHILS ABSOLUTE: 0.2 K/UL (ref 0–0.6)
EOSINOPHILS RELATIVE PERCENT: 1.4 %
EPITHELIAL CELLS, UA: 2 /HPF (ref 0–5)
GFR AFRICAN AMERICAN: >60
GFR NON-AFRICAN AMERICAN: >60
GLOBULIN: 2.6 G/DL
GLUCOSE BLD-MCNC: 92 MG/DL (ref 70–99)
GLUCOSE URINE: NEGATIVE MG/DL
HCT VFR BLD CALC: 32.5 % (ref 36–48)
HEMOGLOBIN: 10.9 G/DL (ref 12–16)
HYALINE CASTS: 3 /LPF (ref 0–8)
KETONES, URINE: NEGATIVE MG/DL
LEUKOCYTE ESTERASE, URINE: ABNORMAL
LIPASE: 18 U/L (ref 13–60)
LYMPHOCYTES ABSOLUTE: 2.5 K/UL (ref 1–5.1)
LYMPHOCYTES RELATIVE PERCENT: 23.4 %
MCH RBC QN AUTO: 30.7 PG (ref 26–34)
MCHC RBC AUTO-ENTMCNC: 33.5 G/DL (ref 31–36)
MCV RBC AUTO: 91.8 FL (ref 80–100)
MICROSCOPIC EXAMINATION: YES
MONOCYTES ABSOLUTE: 0.5 K/UL (ref 0–1.3)
MONOCYTES RELATIVE PERCENT: 5.1 %
NEUTROPHILS ABSOLUTE: 7.5 K/UL (ref 1.7–7.7)
NEUTROPHILS RELATIVE PERCENT: 69.5 %
NITRITE, URINE: NEGATIVE
PDW BLD-RTO: 13.8 % (ref 12.4–15.4)
PH UA: 6.5 (ref 5–8)
PLATELET # BLD: 297 K/UL (ref 135–450)
PMV BLD AUTO: 9.4 FL (ref 5–10.5)
POTASSIUM SERPL-SCNC: 5 MMOL/L (ref 3.5–5.1)
PROTEIN UA: 30 MG/DL
RBC # BLD: 3.55 M/UL (ref 4–5.2)
RBC UA: 213 /HPF (ref 0–4)
SODIUM BLD-SCNC: 141 MMOL/L (ref 136–145)
SPECIFIC GRAVITY UA: 1.01 (ref 1–1.03)
TOTAL PROTEIN: 6.1 G/DL (ref 6.4–8.2)
URIC ACID, SERUM: 6.5 MG/DL (ref 2.6–6)
URINE TYPE: ABNORMAL
UROBILINOGEN, URINE: 0.2 E.U./DL
WBC # BLD: 10.8 K/UL (ref 4–11)
WBC UA: 59 /HPF (ref 0–5)

## 2019-12-27 PROCEDURE — 6360000002 HC RX W HCPCS: Performed by: OBSTETRICS & GYNECOLOGY

## 2019-12-27 PROCEDURE — 1220000000 HC SEMI PRIVATE OB R&B

## 2019-12-27 PROCEDURE — 84550 ASSAY OF BLOOD/URIC ACID: CPT

## 2019-12-27 PROCEDURE — 96366 THER/PROPH/DIAG IV INF ADDON: CPT

## 2019-12-27 PROCEDURE — G0378 HOSPITAL OBSERVATION PER HR: HCPCS

## 2019-12-27 PROCEDURE — 81001 URINALYSIS AUTO W/SCOPE: CPT

## 2019-12-27 PROCEDURE — 2580000003 HC RX 258: Performed by: OBSTETRICS & GYNECOLOGY

## 2019-12-27 PROCEDURE — 99285 EMERGENCY DEPT VISIT HI MDM: CPT

## 2019-12-27 PROCEDURE — 83690 ASSAY OF LIPASE: CPT

## 2019-12-27 PROCEDURE — 76705 ECHO EXAM OF ABDOMEN: CPT

## 2019-12-27 PROCEDURE — 6370000000 HC RX 637 (ALT 250 FOR IP): Performed by: OBSTETRICS & GYNECOLOGY

## 2019-12-27 PROCEDURE — 80053 COMPREHEN METABOLIC PANEL: CPT

## 2019-12-27 PROCEDURE — 36415 COLL VENOUS BLD VENIPUNCTURE: CPT

## 2019-12-27 PROCEDURE — 85025 COMPLETE CBC W/AUTO DIFF WBC: CPT

## 2019-12-27 PROCEDURE — 96365 THER/PROPH/DIAG IV INF INIT: CPT

## 2019-12-27 RX ORDER — IBUPROFEN 800 MG/1
800 TABLET ORAL EVERY 8 HOURS PRN
Status: DISCONTINUED | OUTPATIENT
Start: 2019-12-27 | End: 2019-12-29 | Stop reason: HOSPADM

## 2019-12-27 RX ORDER — LABETALOL HYDROCHLORIDE 5 MG/ML
40 INJECTION, SOLUTION INTRAVENOUS
Status: ACTIVE | OUTPATIENT
Start: 2019-12-27 | End: 2019-12-27

## 2019-12-27 RX ORDER — DOCUSATE SODIUM 100 MG/1
100 CAPSULE, LIQUID FILLED ORAL 2 TIMES DAILY PRN
Status: DISCONTINUED | OUTPATIENT
Start: 2019-12-27 | End: 2019-12-29 | Stop reason: HOSPADM

## 2019-12-27 RX ORDER — LABETALOL HYDROCHLORIDE 5 MG/ML
80 INJECTION, SOLUTION INTRAVENOUS
Status: ACTIVE | OUTPATIENT
Start: 2019-12-27 | End: 2019-12-27

## 2019-12-27 RX ORDER — MAGNESIUM SULFATE IN WATER 40 MG/ML
4 INJECTION, SOLUTION INTRAVENOUS ONCE
Status: COMPLETED | OUTPATIENT
Start: 2019-12-27 | End: 2019-12-27

## 2019-12-27 RX ORDER — ACETAMINOPHEN 500 MG
1000 TABLET ORAL EVERY 8 HOURS PRN
Status: DISCONTINUED | OUTPATIENT
Start: 2019-12-27 | End: 2019-12-29 | Stop reason: HOSPADM

## 2019-12-27 RX ORDER — SODIUM CHLORIDE 0.9 % (FLUSH) 0.9 %
10 SYRINGE (ML) INJECTION EVERY 12 HOURS SCHEDULED
Status: DISCONTINUED | OUTPATIENT
Start: 2019-12-27 | End: 2019-12-29 | Stop reason: HOSPADM

## 2019-12-27 RX ORDER — SODIUM CHLORIDE, SODIUM LACTATE, POTASSIUM CHLORIDE, CALCIUM CHLORIDE 600; 310; 30; 20 MG/100ML; MG/100ML; MG/100ML; MG/100ML
INJECTION, SOLUTION INTRAVENOUS CONTINUOUS
Status: DISCONTINUED | OUTPATIENT
Start: 2019-12-27 | End: 2019-12-29 | Stop reason: HOSPADM

## 2019-12-27 RX ORDER — SODIUM CHLORIDE 0.9 % (FLUSH) 0.9 %
10 SYRINGE (ML) INJECTION PRN
Status: DISCONTINUED | OUTPATIENT
Start: 2019-12-27 | End: 2019-12-29 | Stop reason: HOSPADM

## 2019-12-27 RX ORDER — CALCIUM GLUCONATE 94 MG/ML
1 INJECTION, SOLUTION INTRAVENOUS PRN
Status: DISCONTINUED | OUTPATIENT
Start: 2019-12-27 | End: 2019-12-29 | Stop reason: HOSPADM

## 2019-12-27 RX ORDER — LABETALOL HYDROCHLORIDE 5 MG/ML
20 INJECTION, SOLUTION INTRAVENOUS
Status: ACTIVE | OUTPATIENT
Start: 2019-12-27 | End: 2019-12-27

## 2019-12-27 RX ORDER — HYDRALAZINE HYDROCHLORIDE 20 MG/ML
10 INJECTION INTRAMUSCULAR; INTRAVENOUS
Status: ACTIVE | OUTPATIENT
Start: 2019-12-27 | End: 2019-12-27

## 2019-12-27 RX ADMIN — MAGNESIUM SULFATE HEPTAHYDRATE 2 G/HR: 40 INJECTION, SOLUTION INTRAVENOUS at 20:07

## 2019-12-27 RX ADMIN — MAGNESIUM SULFATE HEPTAHYDRATE 4 G: 40 INJECTION, SOLUTION INTRAVENOUS at 19:20

## 2019-12-27 RX ADMIN — IBUPROFEN 800 MG: 800 TABLET, FILM COATED ORAL at 20:30

## 2019-12-27 RX ADMIN — SODIUM CHLORIDE, POTASSIUM CHLORIDE, SODIUM LACTATE AND CALCIUM CHLORIDE: 600; 310; 30; 20 INJECTION, SOLUTION INTRAVENOUS at 20:30

## 2019-12-27 RX ADMIN — DOCUSATE SODIUM 100 MG: 100 CAPSULE ORAL at 20:30

## 2019-12-27 ASSESSMENT — ENCOUNTER SYMPTOMS
ABDOMINAL PAIN: 1
DIARRHEA: 0
SHORTNESS OF BREATH: 0
VOMITING: 0
NAUSEA: 1
CHEST TIGHTNESS: 0

## 2019-12-27 ASSESSMENT — PAIN SCALES - GENERAL
PAINLEVEL_OUTOF10: 3
PAINLEVEL_OUTOF10: 6

## 2019-12-27 ASSESSMENT — PAIN DESCRIPTION - LOCATION: LOCATION: ABDOMEN

## 2019-12-27 ASSESSMENT — PAIN DESCRIPTION - PAIN TYPE: TYPE: ACUTE PAIN

## 2019-12-28 LAB
A/G RATIO: 1.3 (ref 1.1–2.2)
ALBUMIN SERPL-MCNC: 3.5 G/DL (ref 3.4–5)
ALP BLD-CCNC: 112 U/L (ref 40–129)
ALT SERPL-CCNC: 53 U/L (ref 10–40)
ANION GAP SERPL CALCULATED.3IONS-SCNC: 11 MMOL/L (ref 3–16)
AST SERPL-CCNC: 46 U/L (ref 15–37)
BILIRUB SERPL-MCNC: 0.3 MG/DL (ref 0–1)
BUN BLDV-MCNC: 9 MG/DL (ref 7–20)
CALCIUM SERPL-MCNC: 7.8 MG/DL (ref 8.3–10.6)
CHLORIDE BLD-SCNC: 102 MMOL/L (ref 99–110)
CO2: 25 MMOL/L (ref 21–32)
CREAT SERPL-MCNC: 0.7 MG/DL (ref 0.6–1.1)
GFR AFRICAN AMERICAN: >60
GFR NON-AFRICAN AMERICAN: >60
GLOBULIN: 2.7 G/DL
GLUCOSE BLD-MCNC: 85 MG/DL (ref 70–99)
POTASSIUM SERPL-SCNC: 3.7 MMOL/L (ref 3.5–5.1)
SODIUM BLD-SCNC: 138 MMOL/L (ref 136–145)
TOTAL PROTEIN: 6.2 G/DL (ref 6.4–8.2)

## 2019-12-28 PROCEDURE — 80053 COMPREHEN METABOLIC PANEL: CPT

## 2019-12-28 PROCEDURE — 2580000003 HC RX 258: Performed by: OBSTETRICS & GYNECOLOGY

## 2019-12-28 PROCEDURE — 6370000000 HC RX 637 (ALT 250 FOR IP): Performed by: OBSTETRICS & GYNECOLOGY

## 2019-12-28 PROCEDURE — G0378 HOSPITAL OBSERVATION PER HR: HCPCS

## 2019-12-28 PROCEDURE — 6360000002 HC RX W HCPCS: Performed by: OBSTETRICS & GYNECOLOGY

## 2019-12-28 PROCEDURE — 1220000000 HC SEMI PRIVATE OB R&B

## 2019-12-28 PROCEDURE — 96366 THER/PROPH/DIAG IV INF ADDON: CPT

## 2019-12-28 RX ORDER — LEVOTHYROXINE SODIUM 0.03 MG/1
50 TABLET ORAL DAILY
Status: DISCONTINUED | OUTPATIENT
Start: 2019-12-28 | End: 2019-12-29 | Stop reason: HOSPADM

## 2019-12-28 RX ADMIN — IBUPROFEN 800 MG: 800 TABLET, FILM COATED ORAL at 06:30

## 2019-12-28 RX ADMIN — LEVOTHYROXINE SODIUM 50 MCG: 0.03 TABLET ORAL at 08:50

## 2019-12-28 RX ADMIN — DOCUSATE SODIUM 100 MG: 100 CAPSULE ORAL at 21:43

## 2019-12-28 RX ADMIN — MAGNESIUM SULFATE HEPTAHYDRATE 2 G/HR: 40 INJECTION, SOLUTION INTRAVENOUS at 04:28

## 2019-12-28 RX ADMIN — ACETAMINOPHEN 1000 MG: 500 TABLET, FILM COATED ORAL at 21:46

## 2019-12-28 RX ADMIN — Medication 10 ML: at 08:51

## 2019-12-28 RX ADMIN — IBUPROFEN 800 MG: 800 TABLET, FILM COATED ORAL at 16:23

## 2019-12-28 RX ADMIN — MAGNESIUM SULFATE HEPTAHYDRATE 2 G/HR: 40 INJECTION, SOLUTION INTRAVENOUS at 13:55

## 2019-12-28 ASSESSMENT — PAIN SCALES - GENERAL
PAINLEVEL_OUTOF10: 3
PAINLEVEL_OUTOF10: 2
PAINLEVEL_OUTOF10: 2

## 2019-12-29 VITALS
BODY MASS INDEX: 46.53 KG/M2 | DIASTOLIC BLOOD PRESSURE: 77 MMHG | SYSTOLIC BLOOD PRESSURE: 115 MMHG | WEIGHT: 252.87 LBS | TEMPERATURE: 98.1 F | RESPIRATION RATE: 16 BRPM | HEART RATE: 60 BPM | OXYGEN SATURATION: 98 % | HEIGHT: 62 IN

## 2019-12-29 LAB
ALBUMIN SERPL-MCNC: 3.5 G/DL (ref 3.4–5)
ALP BLD-CCNC: 115 U/L (ref 40–129)
ALT SERPL-CCNC: 42 U/L (ref 10–40)
AST SERPL-CCNC: 32 U/L (ref 15–37)
BILIRUB SERPL-MCNC: 0.3 MG/DL (ref 0–1)
BILIRUBIN DIRECT: <0.2 MG/DL (ref 0–0.3)
BILIRUBIN, INDIRECT: ABNORMAL MG/DL (ref 0–1)
TOTAL PROTEIN: 6.2 G/DL (ref 6.4–8.2)

## 2019-12-29 PROCEDURE — G0378 HOSPITAL OBSERVATION PER HR: HCPCS

## 2019-12-29 PROCEDURE — 6370000000 HC RX 637 (ALT 250 FOR IP): Performed by: OBSTETRICS & GYNECOLOGY

## 2019-12-29 PROCEDURE — 80076 HEPATIC FUNCTION PANEL: CPT

## 2019-12-29 RX ADMIN — LEVOTHYROXINE SODIUM 50 MCG: 0.03 TABLET ORAL at 07:54

## 2020-01-08 LAB
ORGANISM: ABNORMAL
URINE CULTURE, ROUTINE: ABNORMAL

## 2020-01-16 ENCOUNTER — APPOINTMENT (OUTPATIENT)
Dept: CT IMAGING | Age: 35
End: 2020-01-16
Payer: COMMERCIAL

## 2020-01-16 ENCOUNTER — HOSPITAL ENCOUNTER (EMERGENCY)
Age: 35
Discharge: HOME | End: 2020-01-16
Attending: FAMILY MEDICINE
Payer: COMMERCIAL

## 2020-01-16 VITALS
DIASTOLIC BLOOD PRESSURE: 76 MMHG | SYSTOLIC BLOOD PRESSURE: 120 MMHG | HEART RATE: 57 BPM | BODY MASS INDEX: 41.11 KG/M2 | TEMPERATURE: 98 F | OXYGEN SATURATION: 94 % | HEIGHT: 63 IN | WEIGHT: 232 LBS | RESPIRATION RATE: 17 BRPM

## 2020-01-16 LAB
A/G RATIO: 1.3 (ref 1.1–2.2)
ALBUMIN SERPL-MCNC: 4 G/DL (ref 3.4–5)
ALP BLD-CCNC: 133 U/L (ref 40–129)
ALT SERPL-CCNC: 15 U/L (ref 10–40)
ANION GAP SERPL CALCULATED.3IONS-SCNC: 16 MMOL/L (ref 3–16)
AST SERPL-CCNC: 17 U/L (ref 15–37)
BACTERIA: ABNORMAL /HPF
BASOPHILS ABSOLUTE: 0.1 K/UL (ref 0–0.2)
BASOPHILS RELATIVE PERCENT: 0.9 %
BILIRUB SERPL-MCNC: 0.4 MG/DL (ref 0–1)
BILIRUBIN URINE: NEGATIVE
BLOOD, URINE: NEGATIVE
BUN BLDV-MCNC: 13 MG/DL (ref 7–20)
CALCIUM SERPL-MCNC: 9.2 MG/DL (ref 8.3–10.6)
CHLORIDE BLD-SCNC: 106 MMOL/L (ref 99–110)
CLARITY: ABNORMAL
CO2: 19 MMOL/L (ref 21–32)
COLOR: YELLOW
CREAT SERPL-MCNC: 0.7 MG/DL (ref 0.6–1.1)
EKG ATRIAL RATE: 75 BPM
EKG DIAGNOSIS: NORMAL
EKG P AXIS: 55 DEGREES
EKG P-R INTERVAL: 168 MS
EKG Q-T INTERVAL: 382 MS
EKG QRS DURATION: 74 MS
EKG QTC CALCULATION (BAZETT): 426 MS
EKG R AXIS: 30 DEGREES
EKG T AXIS: 37 DEGREES
EKG VENTRICULAR RATE: 75 BPM
EOSINOPHILS ABSOLUTE: 0.2 K/UL (ref 0–0.6)
EOSINOPHILS RELATIVE PERCENT: 2 %
EPITHELIAL CELLS, UA: ABNORMAL /HPF
GFR AFRICAN AMERICAN: >60
GFR NON-AFRICAN AMERICAN: >60
GLOBULIN: 3 G/DL
GLUCOSE BLD-MCNC: 94 MG/DL (ref 70–99)
GLUCOSE URINE: NEGATIVE MG/DL
HCT VFR BLD CALC: 39.9 % (ref 36–48)
HEMOGLOBIN: 13.5 G/DL (ref 12–16)
KETONES, URINE: NEGATIVE MG/DL
LEUKOCYTE ESTERASE, URINE: ABNORMAL
LYMPHOCYTES ABSOLUTE: 4.2 K/UL (ref 1–5.1)
LYMPHOCYTES RELATIVE PERCENT: 41 %
MCH RBC QN AUTO: 30.5 PG (ref 26–34)
MCHC RBC AUTO-ENTMCNC: 33.8 G/DL (ref 31–36)
MCV RBC AUTO: 90.2 FL (ref 80–100)
MICROSCOPIC EXAMINATION: YES
MONOCYTES ABSOLUTE: 0.6 K/UL (ref 0–1.3)
MONOCYTES RELATIVE PERCENT: 5.7 %
NEUTROPHILS ABSOLUTE: 5.1 K/UL (ref 1.7–7.7)
NEUTROPHILS RELATIVE PERCENT: 50.4 %
NITRITE, URINE: NEGATIVE
PDW BLD-RTO: 13 % (ref 12.4–15.4)
PH UA: 6 (ref 5–8)
PLATELET # BLD: 375 K/UL (ref 135–450)
PMV BLD AUTO: 8.5 FL (ref 5–10.5)
POTASSIUM SERPL-SCNC: 3.8 MMOL/L (ref 3.5–5.1)
PROTEIN UA: NEGATIVE MG/DL
RBC # BLD: 4.43 M/UL (ref 4–5.2)
RBC UA: ABNORMAL /HPF (ref 0–2)
SODIUM BLD-SCNC: 141 MMOL/L (ref 136–145)
SPECIFIC GRAVITY UA: 1.01 (ref 1–1.03)
TOTAL PROTEIN: 7 G/DL (ref 6.4–8.2)
TROPONIN: <0.01 NG/ML
TSH REFLEX: 2.9 UIU/ML (ref 0.27–4.2)
URINE REFLEX TO CULTURE: YES
URINE TYPE: ABNORMAL
UROBILINOGEN, URINE: 0.2 E.U./DL
WBC # BLD: 10.2 K/UL (ref 4–11)
WBC UA: ABNORMAL /HPF (ref 0–5)

## 2020-01-16 PROCEDURE — 84484 ASSAY OF TROPONIN QUANT: CPT

## 2020-01-16 PROCEDURE — 80053 COMPREHEN METABOLIC PANEL: CPT

## 2020-01-16 PROCEDURE — 93010 ELECTROCARDIOGRAM REPORT: CPT | Performed by: INTERNAL MEDICINE

## 2020-01-16 PROCEDURE — 93005 ELECTROCARDIOGRAM TRACING: CPT | Performed by: FAMILY MEDICINE

## 2020-01-16 PROCEDURE — 71260 CT THORAX DX C+: CPT

## 2020-01-16 PROCEDURE — 6370000000 HC RX 637 (ALT 250 FOR IP): Performed by: FAMILY MEDICINE

## 2020-01-16 PROCEDURE — 6360000004 HC RX CONTRAST MEDICATION: Performed by: FAMILY MEDICINE

## 2020-01-16 PROCEDURE — 6370000000 HC RX 637 (ALT 250 FOR IP): Performed by: PHYSICIAN ASSISTANT

## 2020-01-16 PROCEDURE — 87086 URINE CULTURE/COLONY COUNT: CPT

## 2020-01-16 PROCEDURE — 85025 COMPLETE CBC W/AUTO DIFF WBC: CPT

## 2020-01-16 PROCEDURE — 99285 EMERGENCY DEPT VISIT HI MDM: CPT

## 2020-01-16 PROCEDURE — 84443 ASSAY THYROID STIM HORMONE: CPT

## 2020-01-16 PROCEDURE — 81001 URINALYSIS AUTO W/SCOPE: CPT

## 2020-01-16 RX ORDER — HYDROXYZINE PAMOATE 25 MG/1
25-50 CAPSULE ORAL 3 TIMES DAILY PRN
Qty: 30 CAPSULE | Refills: 0 | Status: SHIPPED | OUTPATIENT
Start: 2020-01-16 | End: 2020-01-30

## 2020-01-16 RX ORDER — ALPRAZOLAM 0.5 MG/1
1 TABLET ORAL ONCE
Status: COMPLETED | OUTPATIENT
Start: 2020-01-16 | End: 2020-01-16

## 2020-01-16 RX ORDER — ACETAMINOPHEN 500 MG
1000 TABLET ORAL ONCE
Status: COMPLETED | OUTPATIENT
Start: 2020-01-16 | End: 2020-01-16

## 2020-01-16 RX ADMIN — ACETAMINOPHEN 1000 MG: 500 TABLET, FILM COATED ORAL at 07:58

## 2020-01-16 RX ADMIN — ALPRAZOLAM 1 MG: 0.5 TABLET ORAL at 06:21

## 2020-01-16 RX ADMIN — IOPAMIDOL 75 ML: 755 INJECTION, SOLUTION INTRAVENOUS at 06:49

## 2020-01-16 ASSESSMENT — ENCOUNTER SYMPTOMS
WHEEZING: 0
CONSTIPATION: 0
COUGH: 0
DIARRHEA: 0
ABDOMINAL PAIN: 0
SHORTNESS OF BREATH: 1
COLOR CHANGE: 0
BACK PAIN: 0
STRIDOR: 0
NAUSEA: 0
CHEST TIGHTNESS: 1
VOMITING: 0

## 2020-01-16 ASSESSMENT — PAIN DESCRIPTION - LOCATION: LOCATION: HEAD

## 2020-01-16 ASSESSMENT — PAIN SCALES - GENERAL
PAINLEVEL_OUTOF10: 7
PAINLEVEL_OUTOF10: 7

## 2020-01-16 ASSESSMENT — PAIN DESCRIPTION - PAIN TYPE: TYPE: ACUTE PAIN

## 2020-01-16 ASSESSMENT — PAIN DESCRIPTION - DESCRIPTORS: DESCRIPTORS: THROBBING

## 2020-01-16 NOTE — ED NOTES
Pt resting after returning from CT. Pt stated that she is feeling a little better. Urine obtained and sent to lab.      Shahla Hensley RN  01/16/20 4209

## 2020-01-16 NOTE — ED NOTES
Nursing Discharge Notes:    -Patient discharged at this time in no acute distress after verbalizing understanding of discharge instructions and need for follow up with PCP and/or specialist if one was referred.  -A copy of the AVS was reviewed with pt and/or family.  -Pt received applicable scripts which were reviewed with pt and/or family by this RN. -Pt was given the opportunity to ask questions before signing for discharge. Patient Mobility at Discharge:    -Pt left ambulatory to lobby / discharge area. Patient Education:    Learner - Patient and/or family / caregiver. Motivation and Readiness To Learn - Medium to High  Barriers To Learning - None  Learning Preference / Provided Instructions - Both written and verbal discharge instructions.      Radha Vázquez RN  01/16/20 1773

## 2020-01-16 NOTE — ED PROVIDER NOTES
I independently examined and evaluated Rabia Julian. In brief, 71-year-old female status post delivery 1 month ago with prior history of anxiety and recently treated urinary tract infection presents with fairly classic panic attack symptoms described as waking up with sense of palpitations, sense of anxiety, sense of shortness of breath, sense of impending doom and tearfulness. Patient has noticed fairly profound fluctuations in mood but with no SI HI hallucinations or delusions. Focused exam revealed tearful but pleasant. Lungs clear to auscultation with no rales or wheeze. Heart regular rate and rhythm with no murmur. Neck with no cervical lymphadenopathy or palpable thyroid nodule. Lower extremities with no edema. No calf pain. ED course: EKG is normal.  Normal sinus rhythm. Rate 75. Normal axis. . QRS 74. QTc 426. Normal R wave progression. No Q waves. No acute ST elevation or depression. No flipped T waves. No delta wave. No Brugada. Normal EKG. 71-year-old female postpartum with history and physical most consistent with panic attack however will need to rule out arrhythmia, PE, electrolyte abnormality, infectious etiology, other. Will treat symptomatically with 1 mg p.o. Xanax as patient is not breast-feeding. All diagnostic, treatment, and disposition decisions were made by myself in conjunction with the advanced practice provider. For all further details of the patient's emergency department visit, please see the advanced practice provider's documentation. Comment: Please note this report has been produced using speech recognition software and may contain errors related to that system including errors in grammar, punctuation, and spelling, as well as words and phrases that may be inappropriate. If there are any questions or concerns please feel free to contact the dictating provider for clarification.       Grace Cantu MD  01/17/20 0784

## 2020-01-16 NOTE — ED NOTES
-Patient medicated orally as ordered by provider at this time.  -Pt able to take medication and drink liquids without aspiration, nausea, or vomiting.   -Will continue to monitor and re-evaluate.       Marilin Callahan RN  01/16/20 2395

## 2020-01-16 NOTE — ED PROVIDER NOTES
[facility]        Pt Name: Adolfo Wall  MRN: [id number]  [place] 1985  Date of evaluation: 1/16/2020  Provider: HUSSAIN Gunn  PCP: No primary care provider on file. This patient was seen and evaluated by the attending physician King Shameka MD.      [address] [place] [address]       Chief Complaint   Patient presents with    Anxiety     pt had a baby recently and feeling shaky and heart feeling fast. \"I just don't feel right\". Pt recently had UTI and just finished antibiotics. HISTORY OF PRESENT ILLNESS   (Location/Symptom, Timing/Onset, Context/Setting, Quality, Duration, Modifying Factors, Severity)  Note limiting factors. Adolfo Wall is a 36 y.o. female with past medical history depression, headache and hypothyroidism who presents to the ED with complaint of palpitations and possible anxiety. Patient states she got 3-1/2 weeks postpartum from vaginal delivery. Patient states she did have postpartum complication with preeclampsia and required admission on magnesium sulfate. Has followed up with OB/GYN since then and states she is not on any antihypertensive. States blood pressure has been well controlled since that episode. Patient states last night she woke up multiple times with feelings of heart racing and pounding in her head. Patient states she felt she could not breathe. Came to the ED for further evaluation and treatment. States she recently had UTI and completed antibiotic course last week. Patient states she still feels slightly short of breath with associated chest tightness. Denies any diffuse headache at this time. Denies visual changes, speech disturbances, leg swelling, blurred pain, orthopnea, calf tenderness, abdominal pain, nausea/vomiting, urinary symptoms or changes in bowel movements. Denies significant vaginal bleeding or discharge.   Denies significant history of heart problems in the past.  States has had anxiety in the past but states not currently on any medication. States current symptoms feel similar to previous anxiety episodes but slightly worse. States tightness in the chest is described as a 7/10. Nursing Notes were all reviewed and agreed with or any disagreements were addressed in the HPI. REVIEW OF SYSTEMS    (2-9 systems for level 4, 10 or more for level 5)     Review of Systems   Constitutional: Negative for activity change, appetite change, chills, diaphoresis and fever. Respiratory: Positive for chest tightness and shortness of breath. Negative for cough, wheezing and stridor. Cardiovascular: Positive for palpitations. Negative for chest pain and leg swelling. Gastrointestinal: Negative for abdominal pain, constipation, diarrhea, nausea and vomiting. Genitourinary: Negative for decreased urine volume, difficulty urinating, dysuria, flank pain, frequency, genital sores, hematuria, menstrual problem, pelvic pain, urgency, vaginal bleeding, vaginal discharge and vaginal pain. Musculoskeletal: Negative for arthralgias, back pain, myalgias, neck pain and neck stiffness. Skin: Negative for color change, pallor, rash and wound. Neurological: Negative for dizziness, light-headedness and headaches. Positives and Pertinent negatives as per HPI. Except as noted above in the ROS, all other systems were reviewed and negative.        PAST MEDICAL HISTORY     Past Medical History:   Diagnosis Date    Depression     no meds     Headache(784.0)     Hypothyroid          SURGICAL HISTORY     Past Surgical History:   Procedure Laterality Date    BLADDER SURGERY      reflux    KNEE SURGERY      rt    MOUTH SURGERY           CURRENTMEDICATIONS       Previous Medications    IBUPROFEN (ADVIL;MOTRIN) 600 MG TABLET    Take 1 tablet by mouth every 6 hours as needed for Pain    LEVOTHYROXINE SODIUM (SYNTHROID PO)    Take 50 mg by mouth daily ALLERGIES     Bactrim [sulfamethoxazole-trimethoprim]; Cephalexin; Macrobid [nitrofurantoin macrocrystal]; and Maxalt [rizatriptan]    FAMILYHISTORY       Family History   Problem Relation Age of Onset    High Blood Pressure Mother     Depression Mother     Diabetes Mother     High Blood Pressure Father     Breast Cancer Maternal Aunt     Cancer Maternal Aunt     Breast Cancer Paternal Grandmother           SOCIAL HISTORY       Social History     Tobacco Use    Smoking status: Former Smoker    Smokeless tobacco: Never Used   Substance Use Topics    Alcohol use: Not Currently     Comment: social    Drug use: No       SCREENINGS             PHYSICAL EXAM    (up to 7 for level 4, 8 or more for level 5)     ED Triage Vitals [01/16/20 0608]   BP Temp Temp Source Pulse Resp SpO2 Height Weight   (!) 140/95 98 °F (36.7 °C) Oral 69 20 98 % 5' 3\" (1.6 m) 232 lb (105.2 kg)       Physical Exam  Constitutional:       General: She is not in acute distress. Appearance: Normal appearance. She is well-developed. She is not ill-appearing, toxic-appearing or diaphoretic. HENT:      Head: Normocephalic and atraumatic. Right Ear: External ear normal.      Left Ear: External ear normal.   Eyes:      General:         Right eye: No discharge. Left eye: No discharge. Extraocular Movements: Extraocular movements intact. Conjunctiva/sclera: Conjunctivae normal.      Pupils: Pupils are equal, round, and reactive to light. Neck:      Musculoskeletal: Normal range of motion and neck supple. No neck rigidity or muscular tenderness. Cardiovascular:      Rate and Rhythm: Normal rate and regular rhythm. Pulses: Normal pulses. Heart sounds: Normal heart sounds. No murmur. No friction rub. No gallop. Comments: 2+ radial pulses bilaterally. No pedal edema. No calf tenderness. No JVD. Pulmonary:      Effort: Pulmonary effort is normal. No respiratory distress.       Breath sounds: within normal limits    Narrative:     Performed at:  [facility]  [address] [facility]  [employer], [address] [place] [address]   Phone [phone]   URINE CULTURE   CBC WITH AUTO DIFFERENTIAL    Narrative:     Performed at:  [facility]  [address] [facility]  [employer], [address]   Phone [phone]   TROPONIN    Narrative:     Performed at:  [facility] [address] [facility]  [employer], [address] [place] [address]   Phone [phone]   TSH WITH REFLEX       All other labs were within normal range or not returned as of this dictation. EKG: All EKG's are interpreted by the Emergency Department Physician in the absence of a cardiologist.  Please see their note for interpretation of EKG. RADIOLOGY:   Non-plain film images such as CT, Ultrasound and MRI are read by the radiologist. Plain radiographic images are visualized and preliminarily interpreted by the  ED Provider with the below findings:        Interpretation per the Radiologist below, if available at the time of this note:    CT CHEST PULMONARY EMBOLISM W CONTRAST   Final Result   1. No acute abnormality. Ct Chest Pulmonary Embolism W Contrast    Result Date: 1/16/2020  EXAMINATION: CTA OF THE CHEST 1/16/2020 6:43 am TECHNIQUE: CTA of the chest was performed after the administration of intravenous contrast.  Multiplanar reformatted images are provided for review. MIP images are provided for review. Dose modulation, iterative reconstruction, and/or weight based adjustment of the mA/kV was utilized to reduce the radiation dose to as low as reasonably achievable.  COMPARISON: 07/27/2017 HISTORY: ORDERING SYSTEM PROVIDED HISTORY: post partum chest pain and palpitations TECHNOLOGIST PROVIDED HISTORY: Reason for exam:->post partum chest pain and palpitations Reason for Exam: chest pain Acuity: Unknown Type of Exam: Unknown Relevant Medical/Surgical History: (pt had a baby recently and feeling shaky and heart feeling fast. \"I just don't feel right\". Pt recently had UTI and just finished antibiotics. ) FINDINGS: Pulmonary Arteries: There is no acute pulmonary thromboembolus. Mediastinum: The heart is unremarkable. There is a 1.5 x 2.6 cm right pericardial cyst.  There are no enlarged thoracic lymph nodes. Residual thymus tissue is present anterior mediastinum. Lungs/pleura: The airway is patent. There is no pneumothorax or pleural effusion. There is right basilar atelectasis. Upper Abdomen: Within the left hepatic lobe, there is an unchanged 1.6 x 2.6 cm indeterminate low-attenuation lesion favoring a hemangioma. Soft Tissues/Bones: The osseous structures are unremarkable. 1. No acute abnormality. PROCEDURES   Unless otherwise noted below, none     Procedures    CRITICAL CARE TIME   N/A    CONSULTS:  None      EMERGENCY DEPARTMENT COURSE and DIFFERENTIAL DIAGNOSIS/MDM:   Vitals:    Vitals:    01/16/20 0630 01/16/20 0700 01/16/20 0730 01/16/20 0800   BP: 123/73 116/70 118/76 114/68   Pulse: 60 63 71 57   Resp: 16 17 16 16   Temp:       TempSrc:       SpO2: 94% 96% 97% 95%   Weight:       Height:           Patient was given thefollowing medications:  Medications   ALPRAZolam (XANAX) tablet 1 mg (1 mg Oral Given 1/16/20 0621)   iopamidol (ISOVUE-370) 76 % injection 75 mL (75 mLs Intravenous Given 1/16/20 0649)   acetaminophen (TYLENOL) tablet 1,000 mg (1,000 mg Oral Given 1/16/20 6898)       Patient is a 29-year-old female who presents to the ED with complaint of palpitations, chest tightness and shortness of breath. Patient is postpartum at this time. Does a history of anxiety but not currently on any medication. States does feel similar to previous anxiety but slightly worse. Came to the ED for further evaluation and treatment. Patient given dose of Xanax here in the ED by attending physician.   Upon my evaluation patient already has Xanax on board and states she is feeling much better at this time. States symptoms have significantly improved. Given postpartum status with complaints of chest tightness and shortness of breath IV established and blood work obtained. CBC showed normal white count, hemoglobin and platelets. CMP relatively unremarkable other than CO2 of 19. Normal LFTs. Troponin normal.  TSH pending. EKG interpreted by attending. CT of the chest obtained and showed no evidence of pulmonary embolism or acute abnormality. Patient states she has developing a slight headache here in the ED and given some Tylenol which did help. Patient's blood pressure initially elevated when she arrived to the emergency department but has improved upon repeat exam.  Most recent blood pressure 114/68. Patient has had normal blood pressures other than the initial arrival blood pressure. Believe this most likely false elevated reading most likely due to patient's what appeared to be anxiety. Reassuring work-up otherwise and do not believe patient has evidence of CVA, TIA, subarachnoid hemorrhage, subdural hematoma, meningitis, encephalitis, eclampsia, preeclampsia, intracranial mass, intracranial hemorrhage, atypical migraine, ACS, PE, dissection, AAA, pneumonia, pneumothorax respiratory distress, GERD, CHF, COPD, asthma, surgical abdomen, cardiomyopathy, surgical abdomen or other emergent etiology at this time. Urinalysis showed 3+ bacteria with 20-50 white blood cells. Patient denies any urinary symptoms at this time. Was recently diagnosed with UTI and treated with 5 days of Cipro after she developed an allergy to [address]. Patient denies urinary symptoms and just completed antibiotic course recently. Given patient's lack of symptoms will defer treatment pending urine culture especially given the fact that she recently completed antibiotic course. Follow-up with OB/GYN. Return the ED for any worsening symptoms. Discharged home in stable condition.   Given small prescription for Vistaril for home for symptoms. FINAL IMPRESSION      1. Anxiety state    2.  Panic attack          DISPOSITION/PLAN   DISPOSITION Decision To Discharge 01/16/2020 08:29:46 AM      PATIENT REFERREDTO:  [facility] Emergency Department  [address] [facility]  [phone]  Go to   As needed, If symptoms worsen    [facility]) Pre-Services  [phone]          DISCHARGE MEDICATIONS:  New Prescriptions    HYDROXYZINE (VISTARIL) 25 MG CAPSULE    Take 1-2 capsules by mouth 3 times daily as needed for Anxiety       DISCONTINUED MEDICATIONS:  Discontinued Medications    No medications on file              (Please note that portions of this note were completed with a voice recognition program.  Efforts were made to edit the dictations but occasionally words are mis-transcribed.)    HUSSAIN Quevedo (electronically signed)          HUSSAIN Chaudhary  01/16/20 0331

## 2020-01-17 LAB — URINE CULTURE, ROUTINE: NORMAL

## 2020-01-20 PROBLEM — Z3A.39 39 WEEKS GESTATION OF PREGNANCY: Status: RESOLVED | Noted: 2019-12-19 | Resolved: 2020-01-20

## 2020-01-20 PROBLEM — D18.03 HEMANGIOMA OF INTRA-ABDOMINAL STRUCTURE: Status: ACTIVE | Noted: 2017-08-14

## 2020-01-20 PROBLEM — D18.00 HEMANGIOMA: Status: ACTIVE | Noted: 2017-08-14

## 2020-01-20 PROBLEM — Z37.9 NORMAL LABOR: Status: RESOLVED | Noted: 2019-12-22 | Resolved: 2020-01-20

## 2020-01-20 PROBLEM — G43.709 CHRONIC MIGRAINE WITHOUT AURA OR STATUS MIGRAINOSUS: Status: ACTIVE | Noted: 2020-01-20

## 2020-01-20 NOTE — PROGRESS NOTES
for activity change, appetite change, fatigue, fever and unexpected weight change. HENT: Negative for congestion, rhinorrhea, sinus pressure and trouble swallowing. Respiratory: Negative for cough, chest tightness, shortness of breath and wheezing. Cardiovascular: Negative for chest pain, palpitations and leg swelling. Gastrointestinal: Positive for abdominal pain. Negative for abdominal distention, blood in stool, constipation, diarrhea, nausea and vomiting. Genitourinary: Negative for dysuria, frequency and hematuria. Musculoskeletal: Negative for arthralgias and back pain. Skin: Negative for rash. Neurological: Negative for dizziness, weakness, light-headedness, numbness and headaches. Psychiatric/Behavioral: Positive for sleep disturbance. Negative for hallucinations and suicidal ideas. The patient is nervous/anxious. Wt Readings from Last 3 Encounters:   01/27/20 236 lb 9.6 oz (107.3 kg)   01/16/20 232 lb (105.2 kg)   12/28/19 252 lb 13.9 oz (114.7 kg)     BP Readings from Last 3 Encounters:   01/27/20 122/80   01/16/20 120/76   12/29/19 115/77     Pulse Readings from Last 3 Encounters:   01/27/20 74   01/16/20 57   12/29/19 60     /80 (Site: Left Upper Arm, Position: Sitting, Cuff Size: Medium Adult)   Pulse 74   Temp 98.4 °F (36.9 °C) (Oral)   Resp 16   Ht 5' 3\" (1.6 m)   Wt 236 lb 9.6 oz (107.3 kg)   SpO2 96%   BMI 41.91 kg/m²    Physical Exam  Vitals signs reviewed. Constitutional:       General: She is not in acute distress. Appearance: She is not ill-appearing. HENT:      Head: Normocephalic and atraumatic. Right Ear: External ear normal.      Left Ear: External ear normal.      Nose: Nose normal.   Eyes:      Extraocular Movements: Extraocular movements intact. Conjunctiva/sclera: Conjunctivae normal.   Neck:      Musculoskeletal: Normal range of motion. Cardiovascular:      Rate and Rhythm: Normal rate and regular rhythm.    Pulmonary: Effort: Pulmonary effort is normal. No respiratory distress. Breath sounds: No wheezing, rhonchi or rales. Abdominal:      General: Abdomen is flat. There is no distension. Palpations: Abdomen is soft. Musculoskeletal: Normal range of motion. General: No deformity. Right lower leg: No edema. Left lower leg: No edema. Skin:     Coloration: Skin is not jaundiced or pale. Findings: No erythema or rash. Neurological:      General: No focal deficit present. Mental Status: She is alert. Mental status is at baseline. Psychiatric:         Mood and Affect: Mood normal.         Behavior: Behavior normal.       Assessment/Plan:   [facility] was seen today for new patient, anxiety and ed follow-up. Diagnoses and all orders for this visit:    Encounter to establish care with new doctor    Adjustment problems of parenthood  Comments:  I assured patient that she (along with the help of her  and family) will turn out to be a great mother. I also informed her that can sympathize since I too am a first time parent. Acute stress reaction  -     vitamin C (ASCORBIC ACID) 500 MG tablet; Take 1 tablet by mouth daily    Panic attack as reaction to stress  Comments:  After a lengthy discussion, patient elected to start medication for anxiety. I discussed with her that if her anxiety is affecting her life at home and (possibly later when she rejoins) work that medication therapy should be considered. I've explained to her that drugs of the SSRI class can have side effects such as weight gain, sexual dysfunction, insomnia, headache, nausea. These medications are generally effective at alleviating symptoms of anxiety and/or depression. Patient was informed to let me know if any significant side effects do occur.  Patient was instructed to take medication every day as directed and that this medication is not an as needed medication because the medication may take at least 2 weeks to see a difference if not at least 4-6 weeks to see full effect. I also discussed with her that we can consider gradually tapering off the medication after a trial of at least 6 to 12 months. Orders:  -     vitamin C (ASCORBIC ACID) 500 MG tablet; Take 1 tablet by mouth daily    History of post-partum pre-eclampsia  Comments:  BP is WNL at today's visit. Will continue to monitor. Chronic migraine without aura without status migrainosus, not intractable  Comments:  I informed patient that venlafaxine can also be used as a preventative medication for migraines. Orders:  -     Coenzyme Q10 (COQ10) 100 MG CAPS; Take 1 tablet PO three times a day  -     venlafaxine 37.5 MG extended release tablet; Take 1 tablet by mouth daily (with breakfast)  -     Riboflavin 400 MG CAPS; Take 1 capsule by mouth daily    Subclinical hypothyroidism  Comments:  TSH was last checked on 1/16/2020 and was within normal limits. Will recheck in about 2 to 3 months from now. Continue taking current dose of levothyroxine. BMI 40.0-44.9, adult ([facility] [address]  Comments:  I think she would be a good candidate for resuming Topiramate therapy for migraine prevention, which can also aid with weight loss. Follow-up: Return in about 4 weeks (around 2/24/2020) for started on Venlafaxine. . Patient was informed that if his or her symptoms worsen to return here or go to nearest ER if symptoms significantly worsen.      Electronically signed by Felipe Brand MD on 1/27/2020 at 1:20 PM.

## 2020-01-27 ENCOUNTER — OFFICE VISIT (OUTPATIENT)
Dept: FAMILY MEDICINE CLINIC | Age: 35
End: 2020-01-27
Payer: COMMERCIAL

## 2020-01-27 VITALS
WEIGHT: 236.6 LBS | BODY MASS INDEX: 41.92 KG/M2 | RESPIRATION RATE: 16 BRPM | HEIGHT: 63 IN | SYSTOLIC BLOOD PRESSURE: 122 MMHG | TEMPERATURE: 98.4 F | OXYGEN SATURATION: 96 % | HEART RATE: 74 BPM | DIASTOLIC BLOOD PRESSURE: 80 MMHG

## 2020-01-27 PROCEDURE — 99204 OFFICE O/P NEW MOD 45 MIN: CPT | Performed by: FAMILY MEDICINE

## 2020-01-27 RX ORDER — ASCORBIC ACID 500 MG
500 TABLET ORAL DAILY
Qty: 90 TABLET | Refills: 3 | COMMUNITY
Start: 2020-01-27 | End: 2021-11-15

## 2020-01-27 RX ORDER — VENLAFAXINE HYDROCHLORIDE 37.5 MG/1
37.5 TABLET, EXTENDED RELEASE ORAL
Qty: 30 TABLET | Refills: 1 | Status: SHIPPED | OUTPATIENT
Start: 2020-01-27 | End: 2020-03-15 | Stop reason: SDUPTHER

## 2020-01-27 RX ORDER — LEVOTHYROXINE SODIUM 0.05 MG/1
50000 TABLET ORAL DAILY
Qty: 30 TABLET | Refills: 3 | Status: CANCELLED | OUTPATIENT
Start: 2020-01-27

## 2020-01-27 RX ORDER — VITAMIN B COMPLEX
TABLET ORAL
Qty: 90 CAPSULE | Refills: 11 | COMMUNITY
Start: 2020-01-27 | End: 2021-11-15

## 2020-01-27 ASSESSMENT — ENCOUNTER SYMPTOMS
COUGH: 0
SHORTNESS OF BREATH: 0
VOMITING: 0
TROUBLE SWALLOWING: 0
NAUSEA: 0
WHEEZING: 0
ABDOMINAL PAIN: 1
SINUS PRESSURE: 0
RHINORRHEA: 0
DIARRHEA: 0
CONSTIPATION: 0
BACK PAIN: 0
BLOOD IN STOOL: 0
ABDOMINAL DISTENTION: 0
CHEST TIGHTNESS: 0

## 2020-01-27 NOTE — PATIENT INSTRUCTIONS
play a relaxation tape while you drive a car. When should you call for help? Call 911 anytime you think you may need emergency care. For example, call if:    · You feel you cannot stop from hurting yourself or someone else.   Dexter Meza the numbers for these national suicide hotlines: 6-123-143-TALK (7-570.869.1206) and 2-709-RGPSMRX (6-902.395.2880). If you or someone you know talks about suicide or feeling hopeless, get help right away.   Watch closely for changes in your health, and be sure to contact your doctor if:    · You have anxiety or fear that affects your life.     · You have symptoms of anxiety that are new or different from those you had before. Where can you learn more? Go to https://23[employer]Me[employer]Busapeb.Agent [email or web address]. org and sign in to your [employer] account. Enter P754 in the Captify box to learn more about \"Anxiety Disorder: Care Instructions. \"     If you do not have an account, please click on the \"Sign Up Now\" link. Current as of: May 28, 2019  Content Version: 12.3  © 8580-6194 Healthwise, Incorporated. Care instructions adapted under license by [facility] ([facility]). If you have questions about a medical condition or this instruction, always ask your healthcare professional. [address] any warranty or liability for your use of this information.

## 2020-02-10 ENCOUNTER — HOSPITAL ENCOUNTER (OUTPATIENT)
Dept: OTHER | Age: 35
Discharge: HOME | End: 2020-02-10
Payer: COMMERCIAL

## 2020-02-10 LAB
GLUCOSE FASTING: 94 MG/DL (ref 0–99)
GLUCOSE TOLERANCE TEST 2 HOUR: 84 MG/DL

## 2020-02-10 PROCEDURE — 82950 GLUCOSE TEST: CPT

## 2020-02-10 PROCEDURE — 82947 ASSAY GLUCOSE BLOOD QUANT: CPT

## 2020-02-10 PROCEDURE — 36415 COLL VENOUS BLD VENIPUNCTURE: CPT

## 2020-02-21 NOTE — PROGRESS NOTES
Conjunctivae normal.   Neck:      Musculoskeletal: Normal range of motion. Cardiovascular:      Rate and Rhythm: Normal rate and regular rhythm. Pulmonary:      Effort: Pulmonary effort is normal. No respiratory distress. Breath sounds: No wheezing, rhonchi or rales. Abdominal:      General: Abdomen is flat. There is no distension. Palpations: Abdomen is soft. Musculoskeletal: Normal range of motion. General: No deformity. Right lower leg: No edema. Left lower leg: No edema. Skin:     Coloration: Skin is not jaundiced or pale. Findings: No erythema or rash. Neurological:      General: No focal deficit present. Mental Status: She is alert. Mental status is at baseline. Psychiatric:         Mood and Affect: Mood normal.         Behavior: Behavior normal.       Assessment/Plan:     Mandy Durham was seen today for anxiety. Diagnoses and all orders for this visit:    Generalized anxiety disorder  Comments:  Patient opted to continue at current dose of Venlafaxine (37.5 mg). Patient was informed that Sloan Soto is pregnancy category C and that she plans to have another child later in the year that we may have to switch her to more established meds like Sertraline. I've explained to her that drugs of the SSRI class can have side effects such as weight gain, sexual dysfunction, insomnia, headache, nausea. These medications are generally effective at alleviating symptoms of anxiety and/or depression, but increased anxiety can occur shortly after taking the medication(s) and should subside over time. Patient was informed to let me know if any significant side effects do occur. Patient was instructed to take medication every day as directed and that this medication is not an as needed medication because the medication may take at least 2 weeks to see a difference if not at least 4-6 weeks to see full effect.   We also discussed that this medication cannot be abruptly

## 2020-02-24 ENCOUNTER — OFFICE VISIT (OUTPATIENT)
Dept: FAMILY MEDICINE CLINIC | Age: 35
End: 2020-02-24
Payer: COMMERCIAL

## 2020-02-24 VITALS
SYSTOLIC BLOOD PRESSURE: 100 MMHG | WEIGHT: 231 LBS | HEIGHT: 63 IN | HEART RATE: 81 BPM | DIASTOLIC BLOOD PRESSURE: 70 MMHG | OXYGEN SATURATION: 98 % | BODY MASS INDEX: 40.93 KG/M2

## 2020-02-24 PROCEDURE — 99214 OFFICE O/P EST MOD 30 MIN: CPT | Performed by: FAMILY MEDICINE

## 2020-02-24 RX ORDER — OMEGA-3/DHA/EPA/FISH OIL 60 MG-90MG
1 CAPSULE ORAL DAILY
COMMUNITY
End: 2021-11-15

## 2020-02-24 ASSESSMENT — ENCOUNTER SYMPTOMS
COUGH: 0
ABDOMINAL PAIN: 0
SINUS PRESSURE: 0
BACK PAIN: 0
DIARRHEA: 0
CHEST TIGHTNESS: 0
CONSTIPATION: 0
RHINORRHEA: 0
WHEEZING: 0
SHORTNESS OF BREATH: 0
BLOOD IN STOOL: 0
NAUSEA: 0
TROUBLE SWALLOWING: 0
ABDOMINAL DISTENTION: 0
VOMITING: 0

## 2020-02-24 ASSESSMENT — PATIENT HEALTH QUESTIONNAIRE - PHQ9
SUM OF ALL RESPONSES TO PHQ QUESTIONS 1-9: 0
1. LITTLE INTEREST OR PLEASURE IN DOING THINGS: 0
SUM OF ALL RESPONSES TO PHQ9 QUESTIONS 1 & 2: 0
2. FEELING DOWN, DEPRESSED OR HOPELESS: 0
SUM OF ALL RESPONSES TO PHQ QUESTIONS 1-9: 0

## 2020-03-15 ENCOUNTER — E-VISIT (OUTPATIENT)
Dept: PRIMARY CARE CLINIC | Age: 35
End: 2020-03-15
Payer: COMMERCIAL

## 2020-03-15 PROCEDURE — 98970 NQHP OL DIG ASSMT&MGMT 5-10: CPT | Performed by: NURSE PRACTITIONER

## 2020-03-15 RX ORDER — AZITHROMYCIN 250 MG/1
TABLET, FILM COATED ORAL
Qty: 6 TABLET | Refills: 0 | Status: SHIPPED | OUTPATIENT
Start: 2020-03-15 | End: 2020-03-25

## 2020-03-15 ASSESSMENT — LIFESTYLE VARIABLES
PACKS_PER_DAY: 0
SMOKING_STATUS: NO, I'M A FORMER SMOKER
SMOKING_YEARS: 5

## 2020-03-15 NOTE — PROGRESS NOTES
Reviewed questionnaire    Reviewed meds/allergies    Dx URI    Plan Rx given for zpack, follow up with PCP if no improvement in symptoms with use of meds    Time spent on visit- 5 min

## 2020-03-16 RX ORDER — VENLAFAXINE HYDROCHLORIDE 37.5 MG/1
37.5 TABLET, EXTENDED RELEASE ORAL
Qty: 30 TABLET | Refills: 1 | Status: SHIPPED | OUTPATIENT
Start: 2020-03-16 | End: 2020-04-20 | Stop reason: SDUPTHER

## 2020-04-17 NOTE — PROGRESS NOTES
Negative for dizziness, weakness, light-headedness, numbness and headaches. Psychiatric/Behavioral: Negative for agitation, behavioral problems and sleep disturbance. The patient is not nervous/anxious. Wt Readings from Last 3 Encounters:   04/20/20 225 lb (102.1 kg)   02/24/20 231 lb (104.8 kg)   01/27/20 236 lb 9.6 oz (107.3 kg)     BP Readings from Last 3 Encounters:   02/24/20 100/70   01/27/20 122/80   01/16/20 120/76     Pulse Readings from Last 3 Encounters:   02/24/20 81   01/27/20 74   01/16/20 57     Wt 225 lb (102.1 kg)   BMI 39.86 kg/m²    Physical Exam  Vitals signs reviewed. Constitutional:       General: She is not in acute distress. Appearance: She is obese. She is not ill-appearing. HENT:      Head: Normocephalic and atraumatic. Right Ear: External ear normal.      Left Ear: External ear normal.      Nose: Nose normal.   Eyes:      Extraocular Movements: Extraocular movements intact. Conjunctiva/sclera: Conjunctivae normal.   Neck:      Musculoskeletal: Normal range of motion. Cardiovascular:      Rate and Rhythm: Normal rate and regular rhythm. Pulmonary:      Effort: Pulmonary effort is normal. No respiratory distress. Breath sounds: No wheezing, rhonchi or rales. Abdominal:      General: Abdomen is flat. There is no distension. Palpations: Abdomen is soft. Musculoskeletal: Normal range of motion. General: No deformity. Right lower leg: No edema. Left lower leg: No edema. Skin:     Coloration: Skin is not jaundiced or pale. Findings: No erythema or rash. Neurological:      General: No focal deficit present. Mental Status: She is alert. Mental status is at baseline. Psychiatric:         Mood and Affect: Mood normal.         Behavior: Behavior normal.       Assessment/Plan:     Zain Sweet was seen today for anxiety.     Diagnoses and all orders for this visit:    Generalized anxiety disorder with panic attacks  Comments:  Patient opted to continue at current dose of Venlafaxine (37.5 mg). She herself stated that this is helping her. Patient was informed that Mirela Peres is pregnancy category C and that she plans to have another child later in the year that we may have to switch her to more established meds like Sertraline. I've explained to her that drugs of the SSRI/SNRI class can have side effects such as weight gain, sexual dysfunction, insomnia, headache, abdominal discomfort, nausea, vomiting, etc. These medications are generally effective at alleviating symptoms of anxiety and/or depression, but increased anxiety can occur shortly after taking the medication(s) in some patients and should subside over time. Patient was informed to let me know if any significant side effects do occur. Patient was instructed to take the medication every day as directed and that this medication is not an as needed medication because by going even 1-2 days without taking the medication one could risk of having rebound anxiety/depression. In addition, the patient was informed that this medication cannot be abruptly stopped after having had taken it for a long period of time and that the medication would have to be gradually tapered off under the guidance of a healthcare provider. Orders:  -     venlafaxine 37.5 MG extended release tablet; Take 1 tablet by mouth daily (with breakfast)    Chronic migraine without aura without status migrainosus, not intractable  Comments:  I informed patient that venlafaxine can also be used as a preventative medication for migraines. Orders:  -     venlafaxine 37.5 MG extended release tablet;  Take 1 tablet by mouth daily (with breakfast)       Medications Discontinued During This Encounter   Medication Reason    ibuprofen (ADVIL;MOTRIN) 600 MG tablet LIST CLEANUP    Riboflavin 400 MG CAPS Patient Choice    venlafaxine 37.5 MG extended release tablet REORDER      Most common adverse

## 2020-04-20 ENCOUNTER — VIRTUAL VISIT (OUTPATIENT)
Dept: FAMILY MEDICINE CLINIC | Age: 35
End: 2020-04-20
Payer: COMMERCIAL

## 2020-04-20 VITALS — WEIGHT: 225 LBS | BODY MASS INDEX: 39.86 KG/M2

## 2020-04-20 PROBLEM — F41.0 GENERALIZED ANXIETY DISORDER WITH PANIC ATTACKS: Status: ACTIVE | Noted: 2020-04-20

## 2020-04-20 PROBLEM — F41.1 GENERALIZED ANXIETY DISORDER WITH PANIC ATTACKS: Status: ACTIVE | Noted: 2020-04-20

## 2020-04-20 PROCEDURE — 99213 OFFICE O/P EST LOW 20 MIN: CPT | Performed by: FAMILY MEDICINE

## 2020-04-20 RX ORDER — VENLAFAXINE HYDROCHLORIDE 37.5 MG/1
37.5 TABLET, EXTENDED RELEASE ORAL
Qty: 90 TABLET | Refills: 1 | Status: SHIPPED | OUTPATIENT
Start: 2020-04-20 | End: 2021-01-12

## 2020-04-20 ASSESSMENT — ENCOUNTER SYMPTOMS
COUGH: 0
SHORTNESS OF BREATH: 0
SINUS PRESSURE: 0
TROUBLE SWALLOWING: 0
BACK PAIN: 0
ABDOMINAL PAIN: 0
VOMITING: 0
CHEST TIGHTNESS: 0
BLOOD IN STOOL: 0
RHINORRHEA: 0
DIARRHEA: 0
ABDOMINAL DISTENTION: 0
WHEEZING: 0
NAUSEA: 0
CONSTIPATION: 0

## 2020-05-22 RX ORDER — VENLAFAXINE HYDROCHLORIDE 37.5 MG/1
TABLET, EXTENDED RELEASE ORAL
Qty: 30 TABLET | Refills: 0 | OUTPATIENT
Start: 2020-05-22

## 2020-07-27 ENCOUNTER — VIRTUAL VISIT (OUTPATIENT)
Dept: FAMILY MEDICINE CLINIC | Age: 35
End: 2020-07-27
Payer: COMMERCIAL

## 2020-07-27 ENCOUNTER — NURSE TRIAGE (OUTPATIENT)
Dept: OTHER | Facility: CLINIC | Age: 35
End: 2020-07-27

## 2020-07-27 PROCEDURE — 99213 OFFICE O/P EST LOW 20 MIN: CPT | Performed by: FAMILY MEDICINE

## 2020-07-27 RX ORDER — LEVOTHYROXINE SODIUM 0.05 MG/1
75 TABLET ORAL DAILY
COMMUNITY
Start: 2020-07-25 | End: 2022-01-31 | Stop reason: DRUGHIGH

## 2020-07-27 NOTE — PROGRESS NOTES
FLU CLINIC FOLLOW-UP    Tod Miranda   30 y.o. female   1985    Chief Complaint   Patient presents with    Cough     Started 7/25/20    Chest Congestion    Pharyngitis    Headache    Fever     Last fever was this morning. Highest was 100.8. Virtual visit encounter type:  [x] Doxy. me  [] Telephone w/o video  [] MyChart  [] Other:     HPI:  Patient went on a family trip and first went to [place] and [place] on 7/14 and returned on 7/25. Daughter (toddler) and  have milder symptoms of nasal congestion and rhinorrhea.     The patient was tested for: none (thus far)  [] COVID  [] Influenza   [] Strep  [] Other:        SYMPTOMS REVIEW:    Symptom duration: (# of days)  [] 1   [x] 2   [] 3   [] 4   [] 5   [] 6   [] 7   [] 8   [] 9   [] 10   [] 11   [] 12   [] 13 [] 14 + [] None    Symptom course:   [x] Worsening     [] Stable     [] Improving     [] Asymptomatic     Symptoms:  [x] Fever:  [] Subjective (not measured)  [] Thermometer at home  [x] Measured (result: range: F; 100.8 t-max F) [] Last episode of fever - 100.2 F (this morning)  [] Chills  [x] Cough:              [] Dry cough               [x] Productive cough               [] Coughing up blood  [x] Nasal congestion  [x] Runny nose  [] Sinus pressure - location:   [] Chest discomfort:                 [x] Chest tightness                 [x] Chest congestion                 [x] Pleuritic chest pain                 [] Cardiac type chest pain  [] Shortness of breath (SOB):                [] SOB at rest                [] SOB on exertion                [] SOB on lying down  [x] Fatigue (prolonged)  [] Headaches  [] Tolerable  [] Severe  [x] Sore throat  [] Difficulty swallowing  [] Pain with swallowing  [x] Muscle aches  [] Nausea  [] Decreased appetite  [] Vomiting:  []Unable to keep fluids down  [] Diarrhea:  [] Bloody  [] Watery  [] Loose  [x] Loss or significant decrease in taste  [] Loss or significant decrease in smell  [] Rash - location(s):     [] OTHER SYMPTOMS:      MEDICATIONS USED:  [] NSAID:             [] Ibuprofen (Motrin) - last used:             [] Naproxen (Aleve) - last used:              [] Meloxicam (Mobic)             [] Celecoxib (Celebrex)    [x] Tylenol - last used: this morning  [] Other:     RISK FACTORS:  [] Sick contacts:   [] Pregnant or possibly pregnant  [] Age 72 and older  [] Diabetes  [] Heart disease  [] Asthma  [] COPD/Other chronic lung diseases  [] Active cancer  [] On chemotherapy/immunosuppressive drugs  [] Taking oral steroids  [] History of lymphoma/leukemia  [] Close contact with a lab confirmed COVID-19 patient within 14 days of symptom onset  [] History of travel from affected geographical areas within 14 days of symptom onset  [] Health care worker exposure w/ no symptoms  [] Health care worker exposure symptomatic  [] History of or currently smoking  [] Other    EMPLOYMENT HISTORY:  [x] Currently employed - occupation: desk job (non-healthcare related)  [] Currently unemployed - prior occupation:  [] Retired - prior occupation:  [] Other:     SOCIAL HISTORY:  # of household members (including patient): 3  Children: 1    Allergies   Allergen Reactions    Bactrim [Sulfamethoxazole-Trimethoprim]     Cephalexin     Macrobid [Nitrofurantoin Macrocrystal] Hives    Maxalt [Rizatriptan]      Current Outpatient Medications on File Prior to Visit   Medication Sig Dispense Refill    levothyroxine (SYNTHROID) 50 MCG tablet       venlafaxine 37.5 MG extended release tablet Take 1 tablet by mouth daily (with breakfast) 90 tablet 1    Omega-3 Fatty Acids (FISH OIL) 500 MG CAPS Take 1 capsule by mouth daily      Prenatal Vit-Fe Fumarate-FA (PRENATAL 19 PO) Take 1 tablet by mouth daily      Coenzyme Q10 (COQ10) 100 MG CAPS Take 1 tablet PO three times a day 90 capsule 11    vitamin C (ASCORBIC ACID) 500 MG tablet Take 1 tablet by mouth daily 90 tablet 3     No current facility-administered medications on file prior to visit. Family History   Problem Relation Age of Onset    High Blood Pressure Mother     Depression Mother     Diabetes Mother     Anxiety Disorder Mother     High Blood Pressure Father     Breast Cancer Maternal Aunt     Cancer Maternal Aunt     Breast Cancer Paternal Grandmother      Social History     Tobacco Use    Smoking status: Former Smoker     Last attempt to quit: 2018     Years since quittin.5    Smokeless tobacco: Never Used   Substance Use Topics    Alcohol use: Not Currently     Comment: social      Review of Systems   As noted above    Wt Readings from Last 3 Encounters:   20 225 lb (102.1 kg)   20 231 lb (104.8 kg)   20 236 lb 9.6 oz (107.3 kg)     BP Readings from Last 3 Encounters:   20 100/70   20 122/80   20 120/76     Pulse Readings from Last 3 Encounters:   20 81   20 74   20 57     There were no vitals taken for this visit. Physical Exam  ASSESSMENT/PLAN:   Darien Hale was seen today for cough, chest congestion, pharyngitis, headache and fever. Diagnoses and all orders for this visit:    Viral syndrome  Comments:  Given that she has multiple sx that are seen with COVID-19, arranged testing for 2020 at [facility]. I advised that although I am not the PCP of her  as well as her daughter that they all should self-quarantine and speak to their respective PCPs to be evaluated and requesting testing for COVID-19. Generalized anxiety disorder with panic attacks  Comments:  Stable & controlled. Continue Venlafaxine 37.5 mg daily. Educated about COVID-19 virus infection  Comments:  Educational information was provided to the patient regarding the signs and symptoms associated with COVID-19.   Patient was also made aware that COVID-19 is highly contagious and is mainly contracted by being in close proximity (via respiratory droplets) to an infected person with COVID-19 and that the virus can be transmitted even if one is asymptomatic. Patient was also made aware that the virus can be transmitted via oral/body secretions. Patient was advised to wear a mask and gloves especially in large public settings (e.g. grocery store) and maintain social distancing (> 6 feet apart) as much as possible. Patient was informed if one has any symptoms consistent with COVID-19 were to develop or if one has been exposed to a person, who tested positive for COVID-19 to go to one of [facility]'s nearest flu clinics to be evaluated and possibly be tested for COVID-19 (based on the discretion of the healthcare evaluator) or seek care with their PCP (via telemedicine), urgent care, or ER. The patient was advised to make arrangements with family and/or friends to obtaining basic necessities (eg groceries) and that it is recommended that no one visit within the household to prevent possible exposure and transmission to others. Based on current CDC guidelines, at least 72 hours (or 3 days) of being afebrile as well as improvement of overall symptoms is highly recommended before returning to work. In addition, 2 negative tests at least 24 hours apart with the first repeat test being conducted at least 7 days from the positive test are recommended as well. Medications Discontinued During This Encounter   Medication Reason    Levothyroxine Sodium (SYNTHROID PO) DUPLICATE      Patient was informed that whether starting and/or adding a new medication as well as continuing any current medication(s) that the benefits and risks have to be considered and weighed over. Patient was also informed that there are no medications that no side effects. The most common adverse effects of medications were addressed at today's visit. Educational information was provided to the patient regarding the signs and symptoms associated with COVID-19.   Patient was also made aware that COVID-19 is highly contagious and is mainly contracted by being in close proximity (via respiratory droplets) to an infected person with COVID-19 and that the virus can be transmitted even if one is asymptomatic. Patient was also made aware that the virus can be transmitted via oral/body secretions. Patient was advised to wear a mask and gloves, especially in large public settings (e.g. grocery store) and maintain social distancing as much as possible, especially around those who are known to be positive for COVID 19. Patient was informed if one has any symptoms consistent with COVID-19 were to develop or if has been exposed to a person, who tested positive for COVID-19 to go to one of [facility]'s nearest flu clinics to be evaluated and possibly be tested for COVID-19 (based on the discretion of the healthcare evaluator) or seek care with their PCP (via telemedicine), urgent care, or ER. I also discussed with patient if self quarantine is recommended by me that the patient needs to contact someone (family or friends or neighbors, etc.), who can help with acquiring essential such as food or water and things that are important for daily life. Based on current CDC guidelines, at least 72 hours (or 3 days) of being afebrile as well as improvement of overall symptoms is highly recommended before returning to work. Estimated length of time of today's visit: 20 minutes    Follow-up: Return if symptoms worsen or fail to improve. . Patient was informed that if his or her symptoms worsen to return here or go to nearest ER if symptoms significantly worsen. Disclaimer:  Yan Munoz is a 30 y.o. female is being evaluated by a virtual visit (video visit) or telephone (without video) encounter to address their concern(s) as mentioned above. Prior to arranging this appointment, the patient was made aware of the need and importance to schedule the visit in this manner given the current ongoing COVID-19 pandemic.   After this discussion, the patient acknowledged understanding and agreed to today's virtual visit encounter. A caregiver was present when appropriate. Due to this being a TeleHealth encounter (during the UBJRU-69 public health emergency), evaluation of the following organ systems was overall limited: Vitals/Constitutional/EENT/Resp/CV/GI//MS/Neuro/Skin/Heme-Lymph-Imm. Pursuant to the emergency declaration under the [address] [place] [address] and the [employer] Act, this Virtual Visit was conducted with patient's (and/or legal guardian's) consent, to reduce the patient's risk of exposure to COVID-19 and to continue to maintain and provide necessary medical care. The patient (and/or legal guardian) has also been advised to contact this office for worsening conditions or problems, and seek emergency medical treatment and/or call 911 if deemed necessary. Services were provided through either a video synchronous discussion virtually or via telephone (without video) to substitute for in-person clinic visit. Patient and provider were located at their individual home(s) or their most convenient location at the time of visit. Ricci Lim MD on 7/27/2020 at 8:25 PM  [address]     An electronic signature was used to authenticate this note.

## 2020-07-27 NOTE — TELEPHONE ENCOUNTER
Has a cough and chest congestion. She is coughing up thick yellow or orange color. Started Saturday, she woke up Saturday with a sore throat, fever then cough and congestion started later Saturday. Temp 100.2, she stayed in bed all day yesterday. She has a baby that is congested that started thursday, before patient. The baby is starting to get better. Chest congestion is the worst now, sore throat has gotten better. Reason for Disposition   Patient wants to be seen    Answer Assessment - Initial Assessment Questions  1. ONSET: \"When did the cough begin? \"       Saturday, Has a cough and chest congestion. She is coughing up thick yellow or orange color. Started Saturday, she woke up Saturday with a sore throat, fever then cough and congestion started later Saturday. Temp 100.2, she stayed in bed all day yesterday. She has a baby that is congested that started thursday, before patient. The baby is starting to get better. Chest congestion is the worst now, sore throat has gotten better. 2. SEVERITY: \"How bad is the cough today? \"       Coughing up thick mucus    3. RESPIRATORY DISTRESS: \"Describe your breathing. \"       Denies, chest feels heavy from congestion but not having any trouble breathing. 4. FEVER: \"Do you have a fever? \" If so, ask: \"What is your temperature, how was it measured, and when did it start? \"      100.2 taken 9:45 this morning. Has not taken any tylenol today, she did take some Saturday and Sunday. 5. HEMOPTYSIS: \"Are you coughing up any blood? \" If so ask: \"How much? \" (flecks, streaks, tablespoons, etc.)        6. TREATMENT: \"What have you done so far to treat the cough? \" (e.g., meds, fluids, humidifier)      Drinking fluids, tylenol no other OTC medications. 7. CARDIAC HISTORY: \"Do you have any history of heart disease? \" (e.g., heart attack, congestive heart failure)       Denies    8. LUNG HISTORY: \"Do you have any history of lung disease? \"  (e.g., pulmonary embolus, asthma, emphysema)      Denies    9. PE RISK FACTORS: \"Do you have a history of blood clots? \" (or: recent major surgery, recent prolonged travel, bedridden)      Denies. She went on vacation was on her way back. It was a 14 hour drive, they go out every couple of hours and walked around and changed the babys diaper. 10. OTHER SYMPTOMS: \"Do you have any other symptoms? (e.g., runny nose, wheezing, chest pain)        No runny nose, sneezing or wheezing. 11. PREGNANCY: \"Is there any chance you are pregnant? \" \"When was your last menstrual period? \"        Slim chance, her last period was July 10. 12. TRAVEL: \"Have you traveled out of the country in the last month? \" (e.g., travel history, exposures)        Vacation last week. Protocols used: COUGH-ADULT-OH    Pod 1    Prefers a virtual visit. Received call from [address]. Call soft transferred to [address] to schedule appointment. Please do not reply to the triage nurse through this encounter. Any subsequent communication should be directly with the patient.

## 2020-07-28 ENCOUNTER — OFFICE VISIT (OUTPATIENT)
Dept: PRIMARY CARE CLINIC | Age: 35
End: 2020-07-28
Payer: COMMERCIAL

## 2020-07-28 PROCEDURE — 99211 OFF/OP EST MAY X REQ PHY/QHP: CPT | Performed by: NURSE PRACTITIONER

## 2020-07-28 NOTE — PATIENT INSTRUCTIONS

## 2020-07-30 LAB
SARS-COV-2: NOT DETECTED
SOURCE: NORMAL

## 2020-09-27 ENCOUNTER — E-VISIT (OUTPATIENT)
Dept: FAMILY MEDICINE CLINIC | Age: 35
End: 2020-09-27
Payer: COMMERCIAL

## 2020-09-27 PROCEDURE — 99421 OL DIG E/M SVC 5-10 MIN: CPT | Performed by: FAMILY MEDICINE

## 2020-09-27 RX ORDER — CIPROFLOXACIN 500 MG/1
500 TABLET, FILM COATED ORAL 2 TIMES DAILY
Qty: 14 TABLET | Refills: 0 | Status: SHIPPED | OUTPATIENT
Start: 2020-09-27 | End: 2020-10-04

## 2020-09-27 NOTE — PROGRESS NOTES
HPI: as per patient provided history  Exam: N/A (electronic visit)  ASSESSMENT/PLAN:  1. Acute cystitis with hematuria  New problem. Possible early pyelonephritis. Multiple allergies so will use cipro. - ciprofloxacin (CIPRO) 500 MG tablet; Take 1 tablet by mouth 2 times daily for 7 days  Dispense: 14 tablet; Refill: 0      Patient instructed to call the office if worsens, or fails to improve as anticipated. 5-10 minutes were spent on the digital evaluation and management of this patient.     Zuleika Gardiner MD

## 2020-10-13 ENCOUNTER — TELEPHONE (OUTPATIENT)
Dept: FAMILY MEDICINE CLINIC | Age: 35
End: 2020-10-13

## 2020-10-13 NOTE — TELEPHONE ENCOUNTER
Patient sent my chart message on 10/12/20 cancelling her upcoming visit for 81/98 conflict with monica scheduled. Stated she will reschedule when she coordinates with sitter.

## 2020-12-28 ENCOUNTER — HOSPITAL ENCOUNTER (EMERGENCY)
Age: 35
Discharge: HOME | End: 2020-12-28
Payer: COMMERCIAL

## 2020-12-28 ENCOUNTER — APPOINTMENT (OUTPATIENT)
Dept: GENERAL RADIOLOGY | Age: 35
End: 2020-12-28
Payer: COMMERCIAL

## 2020-12-28 ENCOUNTER — TELEPHONE (OUTPATIENT)
Dept: FAMILY MEDICINE CLINIC | Age: 35
End: 2020-12-28

## 2020-12-28 VITALS
OXYGEN SATURATION: 97 % | BODY MASS INDEX: 50.14 KG/M2 | HEART RATE: 64 BPM | DIASTOLIC BLOOD PRESSURE: 73 MMHG | WEIGHT: 283 LBS | HEIGHT: 63 IN | SYSTOLIC BLOOD PRESSURE: 118 MMHG | TEMPERATURE: 98.3 F | RESPIRATION RATE: 14 BRPM

## 2020-12-28 LAB
ANION GAP SERPL CALCULATED.3IONS-SCNC: 8 MMOL/L (ref 3–16)
BASOPHILS ABSOLUTE: 0.1 K/UL (ref 0–0.2)
BASOPHILS RELATIVE PERCENT: 0.6 %
BUN BLDV-MCNC: 13 MG/DL (ref 7–20)
CALCIUM SERPL-MCNC: 9.1 MG/DL (ref 8.3–10.6)
CHLORIDE BLD-SCNC: 106 MMOL/L (ref 99–110)
CO2: 24 MMOL/L (ref 21–32)
CREAT SERPL-MCNC: 0.6 MG/DL (ref 0.6–1.1)
EKG ATRIAL RATE: 71 BPM
EKG DIAGNOSIS: NORMAL
EKG P AXIS: 48 DEGREES
EKG P-R INTERVAL: 140 MS
EKG Q-T INTERVAL: 380 MS
EKG QRS DURATION: 82 MS
EKG QTC CALCULATION (BAZETT): 412 MS
EKG R AXIS: 33 DEGREES
EKG T AXIS: 45 DEGREES
EKG VENTRICULAR RATE: 71 BPM
EOSINOPHILS ABSOLUTE: 0.2 K/UL (ref 0–0.6)
EOSINOPHILS RELATIVE PERCENT: 1.6 %
GFR AFRICAN AMERICAN: >60
GFR NON-AFRICAN AMERICAN: >60
GLUCOSE BLD-MCNC: 109 MG/DL (ref 70–99)
HCG QUALITATIVE: NEGATIVE
HCT VFR BLD CALC: 39.3 % (ref 36–48)
HEMOGLOBIN: 13.1 G/DL (ref 12–16)
LYMPHOCYTES ABSOLUTE: 3.3 K/UL (ref 1–5.1)
LYMPHOCYTES RELATIVE PERCENT: 33.7 %
MCH RBC QN AUTO: 30.2 PG (ref 26–34)
MCHC RBC AUTO-ENTMCNC: 33.2 G/DL (ref 31–36)
MCV RBC AUTO: 91 FL (ref 80–100)
MONOCYTES ABSOLUTE: 0.5 K/UL (ref 0–1.3)
MONOCYTES RELATIVE PERCENT: 4.8 %
NEUTROPHILS ABSOLUTE: 5.9 K/UL (ref 1.7–7.7)
NEUTROPHILS RELATIVE PERCENT: 59.3 %
PDW BLD-RTO: 12.6 % (ref 12.4–15.4)
PLATELET # BLD: 368 K/UL (ref 135–450)
PMV BLD AUTO: 8.3 FL (ref 5–10.5)
POTASSIUM SERPL-SCNC: 4.3 MMOL/L (ref 3.5–5.1)
RBC # BLD: 4.32 M/UL (ref 4–5.2)
SODIUM BLD-SCNC: 138 MMOL/L (ref 136–145)
TROPONIN: <0.01 NG/ML
WBC # BLD: 9.9 K/UL (ref 4–11)

## 2020-12-28 PROCEDURE — 93005 ELECTROCARDIOGRAM TRACING: CPT | Performed by: EMERGENCY MEDICINE

## 2020-12-28 PROCEDURE — 84703 CHORIONIC GONADOTROPIN ASSAY: CPT

## 2020-12-28 PROCEDURE — 85025 COMPLETE CBC W/AUTO DIFF WBC: CPT

## 2020-12-28 PROCEDURE — 71046 X-RAY EXAM CHEST 2 VIEWS: CPT

## 2020-12-28 PROCEDURE — 93010 ELECTROCARDIOGRAM REPORT: CPT | Performed by: INTERNAL MEDICINE

## 2020-12-28 PROCEDURE — 84484 ASSAY OF TROPONIN QUANT: CPT

## 2020-12-28 PROCEDURE — 99283 EMERGENCY DEPT VISIT LOW MDM: CPT

## 2020-12-28 PROCEDURE — 80048 BASIC METABOLIC PNL TOTAL CA: CPT

## 2020-12-28 PROCEDURE — 6370000000 HC RX 637 (ALT 250 FOR IP): Performed by: PHYSICIAN ASSISTANT

## 2020-12-28 RX ORDER — HYDROXYZINE PAMOATE 25 MG/1
25 CAPSULE ORAL ONCE
Status: COMPLETED | OUTPATIENT
Start: 2020-12-28 | End: 2020-12-28

## 2020-12-28 RX ORDER — HYDROXYZINE PAMOATE 25 MG/1
25 CAPSULE ORAL 3 TIMES DAILY PRN
Qty: 20 CAPSULE | Refills: 0 | Status: SHIPPED | OUTPATIENT
Start: 2020-12-28 | End: 2021-01-12

## 2020-12-28 RX ORDER — ONDANSETRON 4 MG/1
4 TABLET, ORALLY DISINTEGRATING ORAL ONCE
Status: COMPLETED | OUTPATIENT
Start: 2020-12-28 | End: 2020-12-28

## 2020-12-28 RX ORDER — BUTALBITAL, ACETAMINOPHEN AND CAFFEINE 50; 325; 40 MG/1; MG/1; MG/1
1 TABLET ORAL ONCE
Status: COMPLETED | OUTPATIENT
Start: 2020-12-28 | End: 2020-12-28

## 2020-12-28 RX ADMIN — HYDROXYZINE PAMOATE 25 MG: 25 CAPSULE ORAL at 17:23

## 2020-12-28 RX ADMIN — BUTALBITAL, ACETAMINOPHEN AND CAFFEINE 1 TABLET: 50; 325; 40 TABLET ORAL at 17:23

## 2020-12-28 RX ADMIN — ONDANSETRON 4 MG: 4 TABLET, ORALLY DISINTEGRATING ORAL at 17:23

## 2020-12-28 ASSESSMENT — PAIN SCALES - GENERAL
PAINLEVEL_OUTOF10: 4
PAINLEVEL_OUTOF10: 7

## 2020-12-28 ASSESSMENT — ENCOUNTER SYMPTOMS
COUGH: 0
ABDOMINAL PAIN: 0
VOMITING: 0
RHINORRHEA: 0
NAUSEA: 0
SHORTNESS OF BREATH: 0
DIARRHEA: 0

## 2020-12-28 NOTE — TELEPHONE ENCOUNTER
----- Message from Lakisha Mccain sent at 12/28/2020 11:26 AM EST -----  Subject: Appointment Request    Reason for Call: Urgent Mental Health    QUESTIONS  Type of Appointment? Established Patient  Reason for appointment request? No appointments available during search  Additional Information for Provider? pt called in to schedule a   appointment for anxiety and panic attacks. there were no appointments   available. please call pt to schedule.  ---------------------------------------------------------------------------  --------------  CALL BACK INFO  What is the best way for the office to contact you? OK to leave message on   voicemail  Preferred Call Back Phone Number? [phone]  ---------------------------------------------------------------------------  --------------  SCRIPT ANSWERS  Relationship to Patient? Self  Appointment reason? Symptomatic  Select script based on patient symptoms? Adult Mental Health [Depression   Anxiety   Panic Attacks   Substance Abuse]  Are you having thoughts of hurting yourself or others? No  Are you seeing or hearing things that may not be real (present)? No  Do you think other people are trying to hurt or target you? No  Are you feeling sick because you have not used drugs or alcohol recently? No  Are you feeling sick because of using drugs or alcohol recently? No  Are you suffering from anxiety or panic attacks? Yes  Have you been diagnosed with   tested for   or told that you are suspected of having COVID-19 (Coronavirus)? No  Have you had a fever or taken medication to treat a fever within the past   3 days? No  Have you had a cough   shortness of breath or flu-like symptoms within the past 3 days? No  Do you currently have flu-like symptoms including fever or chills   cough   shortness of breath   or difficulty breathing   or new loss of taste or smell?  Yes

## 2020-12-28 NOTE — ED NOTES
Bed: [facility]  Expected date:   Expected time:   Means of arrival: Walk In  Comments:     Joseph Small RN  12/28/20 [id number]

## 2020-12-28 NOTE — ED PROVIDER NOTES
Patient seen and evaluated independently by ASHLEE. I was available consultation as needed. This note is provided for EKG interpretation only.      EKG was reviewed by emergency department physician in the absence of a cardiologist    Narrow complex sinus rhythm, rate 71, normal axis, normal FL and QRS intervals, normal Qtc, no ST elevations or depressions, normal t-wave morphology, impression NSR, no STEMI       Eloy Voss DO  12/28/20 [id number]

## 2020-12-28 NOTE — ED PROVIDER NOTES
[facility]        Pt Name: Jacqueline Hsu  MRN: [id number]  [place] 1985  Date of evaluation: 12/28/2020  Provider: Lori Peters PA-C  PCP: Taryn ROSADO. I have evaluated this patient. My supervising physician was available for consultation. CHIEF COMPLAINT       Chief Complaint   Patient presents with    Chest Pain     Pt presents to the ED with chest pain that radiates and causes tingling sensation in the left arm/hand and foot. Pt states that this started yesterday, has been having a chronic migraine. Thought is was anxiety but was nervouse today about the CP        HISTORY OF PRESENT ILLNESS   (Location, Timing/Onset, Context/Setting, Quality, Duration, Modifying Factors, Severity, Associated Signs and Symptoms)  Note limiting factors. Oksana Campbell is a 30 y.o. female presents to the emergency department today for evaluation for chest tightness. The patient states that starting last night she experienced a chest tightness in the middle of her chest, with radiation up towards her neck. She states that she had associated shortness of breath, and a tingling sensation throughout her body. The patient states that she does have a history of anxiety/depression. She states that she was on medication daily however she has not been on any medication recently. She does report increasing stress at home as well as work. The patient states that she is actually not feeling better at this time and does not really have any chest pain. She states that she is just experiencing a tightness and is feeling anxious. She is rating her discomfort as a 4/10. She denies any known alleviating or rating factors. She has no history of hypertension, diabetes or hyperlipidemia. She does not smoke. No family history of any cardiac events. She denies any recent travels, immobilizations or surgeries. She has no history of DVT or PE. No lower leg pain or swelling. No estrogen therapies. The patient adds that while she is here she would also like to have some medication for her headache. She states that it is a dull headache, she states that this is not the worst headache of her life. This is not a typical headache. This was not thunderclap in onset or sudden in nature. The patient states that she has a history of chronic daily migraine headaches since she was at the age of 9. Patient has no visual changes. No numbness, tingling or weakness. No fall or head injury. No cough. No congestion. No fevers. Nursing Notes were all reviewed and agreed with or any disagreements were addressed in the HPI.     REVIEW OF SYSTEMS    (2-9 systems for level 4, 10 or more for level 5)     Review of Systems Constitutional: Negative for activity change, appetite change, chills and fever. HENT: Negative for congestion and rhinorrhea. Respiratory: Negative for cough and shortness of breath. Cardiovascular: Positive for chest pain. Gastrointestinal: Negative for abdominal pain, diarrhea, nausea and vomiting. Genitourinary: Negative for difficulty urinating, dysuria and hematuria. Neurological: Positive for headaches. Negative for weakness and numbness. Positives and Pertinent negatives as per HPI. Except as noted above in the ROS, all other systems were reviewed and negative.        PAST MEDICAL HISTORY     Past Medical History:   Diagnosis Date    Depression     no meds     Headache(784.0)     Hypothyroid          SURGICAL HISTORY     Past Surgical History:   Procedure Laterality Date    BLADDER SURGERY      reflux    KNEE SURGERY      rt   [address]       Discharge Medication List as of 12/28/2020  5:34 PM      CONTINUE these medications which have NOT CHANGED    Details   levothyroxine (SYNTHROID) 50 MCG tablet Historical Med      Prenatal Vit-Fe Fumarate-FA (PRENATAL 19 PO) Take 1 tablet by mouth dailyHistorical Med      venlafaxine 37.5 MG extended release tablet Take 1 tablet by mouth daily (with breakfast), Disp-90 tablet,R-1Normal      Omega-3 Fatty Acids (FISH OIL) 500 MG CAPS Take 1 capsule by mouth dailyHistorical Med      Coenzyme Q10 (COQ10) 100 MG CAPS Take 1 tablet PO three times a day, Disp-90 capsule, R-11OTC      vitamin C (ASCORBIC ACID) 500 MG tablet Take 1 tablet by mouth daily, Disp-90 tablet, R-3OTC               ALLERGIES     Bactrim [sulfamethoxazole-trimethoprim], Cephalexin, Macrobid [nitrofurantoin macrocrystal], and Maxalt [rizatriptan]    FAMILYHISTORY       Family History   Problem Relation Age of Onset    High Blood Pressure Mother     Depression Mother     Diabetes Mother     Anxiety Disorder Mother  High Blood Pressure Father     Breast Cancer Maternal Aunt     Cancer Maternal Aunt     Breast Cancer Paternal Grandmother           SOCIAL HISTORY       Social History     Tobacco Use    Smoking status: Former Smoker     Quit date: 2018     Years since quittin.9    Smokeless tobacco: Never Used   Substance Use Topics    Alcohol use: Not Currently     Comment: social    Drug use: No       SCREENINGS             PHYSICAL EXAM    (up to 7 for level 4, 8 or more for level 5)     ED Triage Vitals [20 1322]   BP Temp Temp Source Pulse Resp SpO2 Height Weight   126/87 98.3 °F (36.8 °C) Oral 67 18 97 % 5' 3\" (1.6 m) 283 lb (128.4 kg)       Physical Exam  Vitals signs and nursing note reviewed. Constitutional:       Appearance: She is well-developed. She is not diaphoretic. HENT:      Head: Normocephalic and atraumatic. Right Ear: External ear normal.      Left Ear: External ear normal.      Nose: Nose normal.   Eyes:      General:         Right eye: No discharge. Left eye: No discharge. Neck:      Musculoskeletal: Normal range of motion and neck supple. Trachea: No tracheal deviation. Cardiovascular:      Rate and Rhythm: Normal rate and regular rhythm. Heart sounds: No murmur. Pulmonary:      Effort: Pulmonary effort is normal. No respiratory distress. Breath sounds: Normal breath sounds. No wheezing or rales. Abdominal:      General: Bowel sounds are normal. There is no distension. Palpations: Abdomen is soft. Tenderness: There is no abdominal tenderness. There is no guarding. Musculoskeletal: Normal range of motion. Skin:     General: Skin is warm and dry. Neurological:      Mental Status: She is alert and oriented to person, place, and time. GCS: GCS eye subscore is 4. GCS verbal subscore is 5. GCS motor subscore is 6.    Psychiatric:         Behavior: Behavior normal.         DIAGNOSTIC RESULTS   LABS:    Labs Reviewed BASIC METABOLIC PANEL - Abnormal; Notable for the following components:       Result Value    Glucose 109 (*)     All other components within normal limits    Narrative:     Performed at:  [facility]  [address], [address]   Phone [phone]   TROPONIN    Narrative:     Performed at:  [facility]  [address],  [place], [address]   Phone [phone]   CBC WITH AUTO DIFFERENTIAL    Narrative:     Performed at:  [facility] [address] [place], [address]   Phone [phone]   HCG, SERUM, QUALITATIVE    Narrative:     Performed at:  [facility] [address], [place]   Phone [phone]       All other labs were within normal range or not returned as of this dictation. EKG: All EKG's are interpreted by the Emergency Department Physician in the absence of a cardiologist.  Please see their note for interpretation of EKG. RADIOLOGY:   Non-plain film images such as CT, Ultrasound and MRI are read by the radiologist. Plain radiographic images are visualized and preliminarily interpreted by the ED Provider with the below findings:        Interpretation per the Radiologist below, if available at the time of this note:    XR CHEST (2 VW)   Preliminary Result   No acute cardiopulmonary disease.            Xr Chest (2 Vw)    Result Date: 12/28/2020 EXAMINATION: TWO XRAY VIEWS OF THE CHEST 12/28/2020 4:19 pm COMPARISON: 11/01/2008 HISTORY: ORDERING SYSTEM PROVIDED HISTORY: CHEST PAIN TECHNOLOGIST PROVIDED HISTORY: Reason for exam:->CHEST PAIN Reason for Exam: chest pain Acuity: Acute Type of Exam: Initial FINDINGS: Frontal and lateral views of the chest were performed. There is no acute skeletal abnormality. Heart size and mediastinal contours are stable, and within normal limits. The lungs are otherwise clear, without evidence of acute airspace consolidation, pneumothorax, or pleural effusion. No acute cardiopulmonary disease. PROCEDURES   Unless otherwise noted below, none     Procedures    CRITICAL CARE TIME   N/A    CONSULTS:  None      EMERGENCY DEPARTMENT COURSE and DIFFERENTIAL DIAGNOSIS/MDM:   Vitals:    Vitals:    12/28/20 1322 12/28/20 1724   BP: 126/87 118/73   Pulse: 67 64   Resp: 18 14   Temp: 98.3 °F (36.8 °C)    TempSrc: Oral    SpO2: 97% 97%   Weight: 283 lb (128.4 kg)    Height: 5' 3\" (1.6 m)        Patient was given the following medications:  Medications   hydrOXYzine (VISTARIL) capsule 25 mg (25 mg Oral Given 12/28/20 1723)   butalbital-acetaminophen-caffeine (FIORICET, ESGIC) per tablet 1 tablet (1 tablet Oral Given 12/28/20 1723)   ondansetron (ZOFRAN-ODT) disintegrating tablet 4 mg (4 mg Oral Given 12/28/20 1723)           Briefly, this is a 80-year-old female who presents to the emergency department today for evaluation for chest pain. She states that she has been experiencing a chest tightness with a whole body tingling since last night. She has a heart score of 1. PERC negative. Low Wells score. Patient is also asking for medication for headache. She states that she has chronic migraine headaches. On physical exam, she appears to be here, otherwise was unremarkable    EKG recommended by Dr. Beronica Jim, please see his note for further details. Discharge Medication List as of 12/28/2020  5:34 PM                 (Please note that portions of this note were completed with a voice recognition program.  Efforts were made to edit the dictations but occasionally words are mis-transcribed.)    Perico Mims PA-C (electronically signed)            Perico Mims PA-C  12/28/20 3010

## 2020-12-29 ENCOUNTER — VIRTUAL VISIT (OUTPATIENT)
Dept: FAMILY MEDICINE CLINIC | Age: 35
End: 2020-12-29
Payer: COMMERCIAL

## 2020-12-29 ENCOUNTER — NURSE TRIAGE (OUTPATIENT)
Dept: OTHER | Facility: CLINIC | Age: 35
End: 2020-12-29

## 2020-12-29 DIAGNOSIS — R10.13 DYSPEPSIA: ICD-10-CM

## 2020-12-29 DIAGNOSIS — F41.0 PANIC ATTACK AS REACTION TO STRESS: ICD-10-CM

## 2020-12-29 DIAGNOSIS — F43.0 PANIC ATTACK AS REACTION TO STRESS: ICD-10-CM

## 2020-12-29 DIAGNOSIS — G44.209 ACUTE NON INTRACTABLE TENSION-TYPE HEADACHE: Primary | ICD-10-CM

## 2020-12-29 DIAGNOSIS — F43.20 ADULT SITUATIONAL STRESS DISORDER: ICD-10-CM

## 2020-12-29 PROCEDURE — 99214 OFFICE O/P EST MOD 30 MIN: CPT | Performed by: CLINICAL NURSE SPECIALIST

## 2020-12-29 RX ORDER — FAMOTIDINE 20 MG/1
20 TABLET, FILM COATED ORAL 2 TIMES DAILY
Qty: 60 TABLET | Refills: 0 | Status: SHIPPED | OUTPATIENT
Start: 2020-12-29 | End: 2021-01-12 | Stop reason: ALTCHOICE

## 2020-12-29 RX ORDER — AMITRIPTYLINE HYDROCHLORIDE 10 MG/1
10 TABLET, FILM COATED ORAL NIGHTLY
Qty: 30 TABLET | Refills: 0 | Status: SHIPPED | OUTPATIENT
Start: 2020-12-29 | End: 2021-01-12 | Stop reason: ALTCHOICE

## 2020-12-29 RX ORDER — IBUPROFEN 600 MG/1
600 TABLET ORAL EVERY 8 HOURS PRN
Qty: 40 TABLET | Refills: 0 | Status: SHIPPED | OUTPATIENT
Start: 2020-12-29 | End: 2021-01-12 | Stop reason: ALTCHOICE

## 2020-12-29 ASSESSMENT — ENCOUNTER SYMPTOMS
SINUS PRESSURE: 0
SORE THROAT: 0
BLURRED VISION: 0
VISUAL CHANGE: 0
PHOTOPHOBIA: 0

## 2020-12-29 NOTE — PROGRESS NOTES
SUBJECTIVE:    Patient ID:  Jose Luis Russell is a 30 y.o. female      This visit was conducted virtually for an ED follow up for concerns of chest tightness and headache. ED notes, labs and imaging were reviewed. She was given a one time dose of Fioricet and Vistaril while in the ED. States she stopped the venlafaxine in September due to trying to get pregnant. States she has been under considerable amount of stress at work and home. Discussed tension headaches and adult situational stress. Discussed stress management techniques, information given. Discussed risk, benefits and potential adverse affects of amitriptyline and ibuprofen. Patient verbalizes understanding and is agreeable to treatment plan. She is also experiencing indigestion/heartburn. Discussed GERD precautions, information given. Discussed trial of famotidine 20 mg twice daily as needed for dyspepsia. Denies difficulty swallowing, epigastric pain, nausea, vomiting, diarrhea, constipation or blood in stool. During this virtual visit she is in the process of feeding her toddler, continue to work, dealing with the dog and trying to conduct an virtual visit.        Headache This is a new problem. Episode onset: 12/26. The problem occurs constantly. The problem has been waxing and waning. The pain is located in the bilateral, parietal and temporal region. The pain quality is similar to prior headaches. The quality of the pain is described as dull and aching. The pain is at a severity of 7/10. Associated symptoms include anorexia. Pertinent negatives include no abdominal pain, blurred vision (slight), coughing, dizziness, drainage, fever, nausea, phonophobia, photophobia, sinus pressure, sore throat, visual change, vomiting or weakness. Insomnia: at times. Nothing aggravates the symptoms. She has tried NSAIDs (Fioricet) for the symptoms. The treatment provided mild relief. Her past medical history is significant for migraine headaches and obesity. There is no history of cancer, hypertension, immunosuppression or recent head traumas. Current Outpatient Medications on File Prior to Visit   Medication Sig Dispense Refill    hydrOXYzine (VISTARIL) 25 MG capsule Take 1 capsule by mouth 3 times daily as needed for Anxiety 20 capsule 0    levothyroxine (SYNTHROID) 50 MCG tablet       venlafaxine 37.5 MG extended release tablet Take 1 tablet by mouth daily (with breakfast) 90 tablet 1    Omega-3 Fatty Acids (FISH OIL) 500 MG CAPS Take 1 capsule by mouth daily      Prenatal Vit-Fe Fumarate-FA (PRENATAL 19 PO) Take 1 tablet by mouth daily      Coenzyme Q10 (COQ10) 100 MG CAPS Take 1 tablet PO three times a day 90 capsule 11    vitamin C (ASCORBIC ACID) 500 MG tablet Take 1 tablet by mouth daily 90 tablet 3     No current facility-administered medications on file prior to visit.        Past Medical History:   Diagnosis Date    Depression     no meds     Headache(784.0)     Hypothyroid      Past Surgical History:   Procedure Laterality Date    BLADDER SURGERY      reflux    KNEE SURGERY      rt    MOUTH SURGERY       Family History   Problem Relation Age of Onset  High Blood Pressure Mother     Depression Mother     Diabetes Mother     Anxiety Disorder Mother     High Blood Pressure Father     Breast Cancer Maternal Aunt     Cancer Maternal Aunt     Breast Cancer Paternal Grandmother      Social History     Socioeconomic History    Marital status:      Spouse name: Not on file    Number of children: Not on file    Years of education: Not on file    Highest education level: Not on file   Occupational History    Not on file   Social Needs    Financial resource strain: Not on file    Food insecurity     Worry: Not on file     Inability: Not on file    Transportation needs     Medical: Not on file     Non-medical: Not on file   Tobacco Use    Smoking status: Former Smoker     Quit date: 1/1/2018     Years since quitting: 3.0    Smokeless tobacco: Never Used   Substance and Sexual Activity    Alcohol use: Not Currently     Comment: social    Drug use: No    Sexual activity: Yes     Partners: Male   Lifestyle    Physical activity     Days per week: Not on file     Minutes per session: Not on file    Stress: Not on file   Relationships    Social connections     Talks on phone: Not on file     Gets together: Not on file     Attends Congregation service: Not on file     Active member of club or organization: Not on file     Attends meetings of clubs or organizations: Not on file     Relationship status: Not on file    Intimate partner violence     Fear of current or ex partner: Not on file     Emotionally abused: Not on file     Physically abused: Not on file     Forced sexual activity: Not on file   Other Topics Concern    Not on file   Social History Narrative    Not on file       Review of Systems   Constitutional: Positive for fatigue. Negative for chills and fever. HENT: Negative for postnasal drip, sinus pressure, sore throat and trouble swallowing. Eyes: Negative for blurred vision (slight), photophobia and visual disturbance. Respiratory: Negative for cough, chest tightness, shortness of breath and wheezing. Cardiovascular: Negative for chest pain, palpitations and leg swelling. Gastrointestinal: Positive for anorexia. Negative for abdominal pain, constipation, diarrhea, nausea and vomiting. Dyspepsia   Skin: Negative for rash. Neurological: Positive for headaches. Negative for dizziness, syncope, weakness and light-headedness. Psychiatric/Behavioral: Negative for dysphoric mood, self-injury and suicidal ideas. Sleep disturbance: at times. The patient is nervous/anxious. Insomnia: at times. OBJECTIVE:    Physical Exam  Vitals signs and nursing note reviewed. Constitutional:       General: She is not in acute distress. Appearance: Normal appearance. She is not ill-appearing, toxic-appearing or diaphoretic. HENT:      Head: Normocephalic and atraumatic. Right Ear: External ear normal.      Left Ear: External ear normal.      Nose: Nose normal.      Mouth/Throat:      Mouth: Mucous membranes are moist.   Eyes:      Extraocular Movements: Extraocular movements intact. Conjunctiva/sclera: Conjunctivae normal.      Pupils: Pupils are equal, round, and reactive to light. Neck:      Musculoskeletal: Normal range of motion. Pulmonary:      Effort: Pulmonary effort is normal. No respiratory distress. Musculoskeletal: Normal range of motion. Skin:     General: Skin is dry. Coloration: Skin is not pale. Findings: No rash. Neurological:      Mental Status: She is alert and oriented to person, place, and time. Psychiatric:         Mood and Affect: Mood normal.         Behavior: Behavior normal.       There were no vitals taken for this visit.    BP Readings from Last 3 Encounters:   12/28/20 118/73   02/24/20 100/70   01/27/20 122/80      Wt Readings from Last 3 Encounters:   12/28/20 283 lb (128.4 kg)   04/20/20 225 lb (102.1 kg)   02/24/20 231 lb (104.8 kg)       ASSESSMENT & PLAN: 1. Acute non intractable tension-type headache  - amitriptyline (ELAVIL) 10 MG tablet; Take 1 tablet by mouth nightly  Dispense: 30 tablet; Refill: 0  - ibuprofen (ADVIL;MOTRIN) 600 MG tablet; Take 1 tablet by mouth every 8 hours as needed for Pain  Dispense: 40 tablet; Refill: 0    2. Dyspepsia  - Trial of Pepcid 20 mg twice daily as needed for indigestion/nausea  - famotidine (PEPCID) 20 MG tablet; Take 1 tablet by mouth 2 times daily  Dispense: 60 tablet; Refill: 0  - Small frequent sips and snacks/meals  - Keep diet bland avoid spicy and greasy foods  - BRAT diet (bananas, rice, applesauce and toast)  - Advance as tolerated (scrambled eggs, baked chicken)  - To the ER for increased pain, fever, inability to hold down food and fluids    3. Adult situational stress disorder  - Discussed stress management techniques, information given    4. Panic attack as reaction to stress  - Continue vistaril as needed. directed for anxiety      Continue current treatment plan.     Current Outpatient Medications   Medication Sig Dispense Refill    famotidine (PEPCID) 20 MG tablet Take 1 tablet by mouth 2 times daily 60 tablet 0    amitriptyline (ELAVIL) 10 MG tablet Take 1 tablet by mouth nightly 30 tablet 0    ibuprofen (ADVIL;MOTRIN) 600 MG tablet Take 1 tablet by mouth every 8 hours as needed for Pain 40 tablet 0    hydrOXYzine (VISTARIL) 25 MG capsule Take 1 capsule by mouth 3 times daily as needed for Anxiety 20 capsule 0    levothyroxine (SYNTHROID) 50 MCG tablet       venlafaxine 37.5 MG extended release tablet Take 1 tablet by mouth daily (with breakfast) 90 tablet 1    Omega-3 Fatty Acids (FISH OIL) 500 MG CAPS Take 1 capsule by mouth daily      Prenatal Vit-Fe Fumarate-FA (PRENATAL 19 PO) Take 1 tablet by mouth daily      Coenzyme Q10 (COQ10) 100 MG CAPS Take 1 tablet PO three times a day 90 capsule 11    vitamin C (ASCORBIC ACID) 500 MG tablet Take 1 tablet by mouth daily 90 tablet 3 No current facility-administered medications for this visit. Return if symptoms worsen or fail to improve. Venecia Brown received counseling on the following healthy behaviors: nutrition, exercise and medication adherence    Patient given educational materials on tension headaches, stress management techniques. Discussed use, benefit, and side effects of prescribed medications. Barriers to medication compliance addressed. All patient questions answered. Pt voiced understanding. Call office if experience side effects from medications. Please note that some or all of this record was generated using voice recognition software. If there are any questions about the content of this document, please contact the author as some errors in transcription may have occurred. Bryan Griggs is a 30 y.o. female being evaluated by a Virtual Visit (video visit) encounter to address concerns as mentioned above. A caregiver was present when appropriate. Due to this being a TeleHealth encounter (During WKUVH-38 public health emergency), evaluation of the following organ systems was limited: Vitals/Constitutional/EENT/Resp/CV/GI//MS/Neuro/Skin/Heme-Lymph-Imm. Pursuant to the emergency declaration under the [facility], [address] [place] [address] [facility] authority and the [employer] and [employer] Act, this Virtual Visit was conducted with patient's (and/or legal guardian's) consent, to reduce the patient's risk of exposure to COVID-19 and provide necessary medical care. The patient (and/or legal guardian) has also been advised to contact this office for worsening conditions or problems, and seek emergency medical treatment and/or call 911 if deemed necessary.      Patient identification was verified at the start of the visit: Yes    Total time spent for this encounter: Not billed by time Services were provided through a video synchronous discussion virtually to substitute for in-person clinic visit. Patient and provider were located at their individual homes. --FEDE Campos CNP on 1/3/2021 at 5:03 PM    An electronic signature was used to authenticate this note.

## 2020-12-29 NOTE — TELEPHONE ENCOUNTER
Reason for Disposition   [1] COVID-19 infection suspected by caller or triager AND [2] mild symptoms (cough, fever, or others) AND [7] no complications or SOB    Answer Assessment - Initial Assessment Questions  1. COVID-19 DIAGNOSIS: \"Who made your Coronavirus (COVID-19) diagnosis? \" \"Was it confirmed by a positive lab test?\" If not diagnosed by a HCP, ask \"Are there lots of cases (community spread) where you live? \" (See public health department website, if unsure)      Not tested    2. COVID-19 EXPOSURE: \"Was there any known exposure to COVID before the symptoms began? \" CDC Definition of close contact: within 6 feet (2 meters) for a total of 15 minutes or more over a 24-hour period. Unsure    3. ONSET: \"When did the COVID-19 symptoms start?\"       12/26    4. WORST SYMPTOM: \"What is your worst symptom? \" (e.g., cough, fever, shortness of breath, muscle aches)      H/a    5. COUGH: \"Do you have a cough? \" If so, ask: \"How bad is the cough? \"        Denies    6. FEVER: \"Do you have a fever? \" If so, ask: \"What is your temperature, how was it measured, and when did it start? \"      Denies    7. RESPIRATORY STATUS: \"Describe your breathing? \" (e.g., shortness of breath, wheezing, unable to speak)       Denies    8. BETTER-SAME-WORSE: Yaakov Greening you getting better, staying the same or getting worse compared to yesterday? \"  If getting worse, ask, \"In what way? \"      Worse h/a    9. HIGH RISK DISEASE: \"Do you have any chronic medical problems? \" (e.g., asthma, heart or lung disease, weak immune system, obesity, etc.)      Denies    10. PREGNANCY: \"Is there any chance you are pregnant? \" \"When was your last menstrual period? \"          11. OTHER SYMPTOMS: \"Do you have any other symptoms? \"  (e.g., chills, fatigue, headache, loss of smell or taste, muscle pain, sore throat; new loss of smell or taste especially support the diagnosis of COVID-19)        H/a, fatigue    Protocols used: CORONAVIRUS (COVID-19) DIAGNOSED OR

## 2020-12-29 NOTE — PATIENT INSTRUCTIONS
Small frequent sips and snacks/meals    Keep diet bland avoid spicy and greasy foods    BRAT diet (bananas, rice, applesauce and toast)    Advance as tolerated (scrambled eggs, baked chicken)    To the ER for increased pain, fever, inability to hold down food and fluids    Trial of Pepcid 20 mg twice daily as needed for indigestion/nausea    Ibuprofen 600 mg twice daily as needed for headache (take with food)     Trial of Amitriptyline 10 mg nightly    Keep a headache journal including timing, associated symptoms, treatment and response to treatment     Follow-up in 1 month sooner, if symptoms worsen or persist      Patient Education        Tension Headache: Care Instructions  Your Care Instructions  Most headaches are tension headaches. These headaches tend to happen again, especially if you are under stress. A tension headache may cause pain or a feeling of pressure all over your head. You probably can't pinpoint the center of the pain. If you keep getting tension headaches, the best thing you can do to limit them is to find out what is causing them and then make changes in those areas. Follow-up care is a key part of your treatment and safety. Be sure to make and go to all appointments, and call your doctor if you are having problems. It's also a good idea to know your test results and keep a list of the medicines you take. How can you care for yourself at home? · Rest in a quiet, dark room with a cool cloth on your forehead until your headache is gone. Close your eyes, and try to relax or go to sleep. Don't watch TV or read. Avoid using the computer. · Use a warm, moist towel or a heating pad set on low to relax tight shoulder and neck muscles. · Have someone gently massage your neck and shoulders. · Take pain medicines exactly as directed. ? If the doctor gave you a prescription medicine for pain, take it as prescribed. ? If you are not taking a prescription pain medicine, ask your doctor if you can take an over-the-counter medicine. · Be careful not to take pain medicine more often than the instructions allow, because you may get worse or more frequent headaches when the medicine wears off. · If you get another tension headache, stop what you are doing and sit quietly for a moment. Close your eyes and breathe slowly. Try to relax your head and neck muscles. · Do not ignore new symptoms that occur with a headache, such as fever, weakness or numbness, vision changes, or confusion. These may be signs of a more serious problem. To help prevent headaches  · Keep a headache diary so you can figure out what triggers your headaches. Avoiding triggers may help you prevent headaches. Record when each headache began, how long it lasted, and what the pain was like (throbbing, aching, stabbing, or dull). List anything that may have triggered the headache, such as being physically or emotionally stressed or being anxious or depressed. Other possible triggers are hunger, anger, fatigue, poor posture, and muscle strain. · Find healthy ways to deal with stress. Headaches are most common during or right after stressful times. Take time to relax before and after you do something that has caused a headache in the past.  · Exercise daily to relieve stress. Relaxation exercises may help reduce tension. · Get plenty of sleep. · Eat regularly and well. Long periods without food can trigger a headache. · Treat yourself to a massage. Some people find that massages are very helpful in relieving tension. · Try to keep your muscles relaxed by keeping good posture. Check your jaw, face, neck, and shoulder muscles for tension, and try to relax them. When sitting at a desk, change positions often, and stretch for 30 seconds each hour. · Reduce eyestrain from computers by blinking frequently and looking away from the computer screen every so often. Make sure you have proper eyewear and that your monitor is set up properly, about an arm's length away. When should you call for help? Call 911 anytime you think you may need emergency care. For example, call if:    · You have signs of a stroke. These may include:  ? Sudden numbness, paralysis, or weakness in your face, arm, or leg, especially on only one side of your body. ? Sudden vision changes. ? Sudden trouble speaking. ? Sudden confusion or trouble understanding simple statements. ? Sudden problems with walking or balance. ? A sudden, severe headache that is different from past headaches. Call your doctor now or seek immediate medical care if:    · You have new or worse nausea and vomiting.     · You have a new or higher fever.     · Your headache gets much worse. Watch closely for changes in your health, and be sure to contact your doctor if:    · You are not getting better after 2 days (48 hours). Where can you learn more? Go to https://LensVector.Feed.fm. org and sign in to your LLLer account. Enter 84 [id number] in the [employer] box to learn more about \"Tension Headache: Care Instructions. \"     If you do not have an account, please click on the \"Sign Up Now\" link. Current as of: November 20, 2019               Content Version: 12.6  © 9948-9017 [employer]. Care instructions adapted under license by [facility] ([facility]). If you have questions about a medical condition or this instruction, always ask your healthcare professional. [place] [address] any warranty or liability for your use of this information.          Patient Education        Panic Attacks: Care Instructions  Your Care Instructions During a panic attack, you may have a feeling of intense fear or terror, trouble breathing, chest pain or tightness, heartbeat changes, dizziness, sweating, and shaking. A panic attack starts suddenly and usually lasts from 5 to 20 minutes but may last even longer. You have the most anxiety about 10 minutes after the attack starts. An attack can begin with a stressful event, or it can happen without a cause. Although panic attacks can cause scary symptoms, you can learn to manage them with self-care, counseling, and medicine. Follow-up care is a key part of your treatment and safety. Be sure to make and go to all appointments, and call your doctor if you are having problems. It's also a good idea to know your test results and keep a list of the medicines you take. How can you care for yourself at home? · Take your medicine exactly as directed. Call your doctor if you think you are having a problem with your medicine. · Go to your counseling sessions and follow-up appointments. · Recognize and accept your anxiety. Then, when you are in a situation that makes you anxious, say to yourself, \"This is not an emergency. I feel uncomfortable, but I am not in danger. I can keep going even if I feel anxious. \"  · Be kind to your body:  ? Relieve tension with exercise or a massage. ? Get enough rest.  ? Avoid alcohol, caffeine, nicotine, and illegal drugs. They can increase your anxiety level, cause sleep problems, or trigger a panic attack. ? Learn and do relaxation techniques. See below for more about these techniques. · Engage your mind. Get out and do something you enjoy. Go to a funny movie, or take a walk or hike. Plan your day. Having too much or too little to do can make you anxious. · Keep a record of your symptoms. Discuss your fears with a good friend or family member, or join a support group for people with similar problems. Talking to others sometimes relieves stress. · Get involved in social groups, or volunteer to help others. Being alone sometimes makes things seem worse than they are. · Get at least 30 minutes of exercise on most days of the week to relieve stress. Walking is a good choice. You also may want to do other activities, such as running, swimming, cycling, or playing tennis or team sports. Relaxation techniques  Do relaxation exercises for 10 to 20 minutes a day. You can play soothing, relaxing music while you do them, if you wish. · Tell others in your house that you are going to do your relaxation exercises. Ask them not to disturb you. · Find a comfortable place, away from all distractions and noise. · Lie down on your back, or sit with your back straight. · Focus on your breathing. Make it slow and steady. · Breathe in through your nose. Breathe out through either your nose or mouth. · Breathe deeply, filling up the area between your navel and your rib cage. Breathe so that your belly goes up and down. · Do not hold your breath. · Breathe like this for 5 to 10 minutes. Notice the feeling of calmness throughout your whole body. As you continue to breathe slowly and deeply, relax by doing the following for another 5 to 10 minutes:  · Tighten and relax each muscle group in your body. You can begin at your toes and work your way up to your head. · Imagine your muscle groups relaxing and becoming heavy. · Empty your mind of all thoughts. · Let yourself relax more and more deeply. · Become aware of the state of calmness that surrounds you. · When your relaxation time is over, you can bring yourself back to alertness by moving your fingers and toes and then your hands and feet and then stretching and moving your entire body. Sometimes people fall asleep during relaxation, but they usually wake up shortly afterward. · Always give yourself time to return to full alertness before you drive a car or do anything that might cause an accident if you are not fully alert. Never play a relaxation tape while driving a car. When should you call for help? Call 911 anytime you think you may need emergency care. For example, call if:    · You feel you cannot stop from hurting yourself or someone else. Watch closely for changes in your health, and be sure to contact your doctor if:    · Your panic attacks get worse.     · You have new or different anxiety.     · You are not getting better as expected. Where can you learn more? Go to https://Cardinal Healthpepiceweb.CloudFab. org and sign in to your Mercateo account. Enter H601 in the Social Plus box to learn more about \"Panic Attacks: Care Instructions. \"     If you do not have an account, please click on the \"Sign Up Now\" link. Current as of: January 31, 2020               Content Version: 12.6  © 2006-2020 [employer]. Care instructions adapted under license by [facility] [place] ([facility]). If you have questions about a medical condition or this instruction, always ask your healthcare professional. [place] [address] any warranty or liability for your use of this information. Patient Education        Work-Life Balance: Care Instructions  Your Care Instructions    Do you ever feel like there is not enough time to do all of the things you have to do, and no time at all for the things you enjoy? If so, you are not alone. On average, people in the [place] have worked more and more hours each year since 1970. But in recent years, fewer people say they want to take on more at work, even if they would get promoted or get paid more money. More and more workers say they want time to spend with their families and to do things that are important to them. Do you ever feel:  · That you always have more and more work to do at your job? · That too many people depend on you every day? · That you never have enough time for your family or friends? · That you never have time for hobbies or things you enjoy? · That each second of your day is scheduled? If you answered \"yes\" to any of these questions, take steps at work and at home to get your life into balance. Follow-up care is a key part of your treatment and safety. Be sure to make and go to all appointments, and call your doctor if you are having problems. How can you care for yourself at home? Manage your time  · Focus on the important things. Taking on too much can wear you out. Look at how you spend your time, and redirect your focus. Learn to say \"no\" and let go of things that do not matter. · Set one small goal at a time. Use a day planner. Break large projects into smaller ones. · Ask for help. Let your children, your spouse, your coworkers, and other people in your life help you get things done. · Leave your job at the office. If you give up free time to get more work done, you may pay for it with stress. If your job offers a flexible work schedule, use it to fit your own work style. For instance, come in earlier to have a longer lunch break, or make time for a yoga class or workout during your workday. · Unplug. Do not let technology (such as your cell phone or the Internet) erase the line between your time and your employer's time. Lower job stress  Job stress causes trouble at work and at home. At work, you may worry about things you have not had time to do at home. At home, you may worry about your job. This cycle upsets your work-life balance. Lowering your job stress can get your life back in balance. Job stress can be caused by:  · Pressure and deadlines. · Heavy workloads or long hours. · Not being allowed to make decisions. · Health and safety hazards. · Feeling you may lose your job. · Unclear or changing job duties. · Too much responsibility. · Work that is very tiring or boring. Do any of these things bother you? Consider talking with your boss to change things. There are some things that you may not be able to control. But even a few small changes might help lower your stress. Take advantage of programs at work  Businesses make money and are better off in other ways if their employees are healthy and happy. For this reason, many companies have programs to help balance work life and home life. These programs may include:  · Flexible schedules and hours. · Time off for family reasons, education, or community service. · Being able to work from home. · Employee assistance programs to provide counseling. · Child-care programs. Check to see if your company has any of these or other programs that could help you. If not, consider talking to your boss about why work-life balance programs make good business sense. Even if your company does not start an official program, you may be able to get flexible hours, time off, or the ability to do some work from home. Know when to quit  If you are truly unhappy because of a stressful job, and if the suggestions here have not worked, it may be time to think about changing jobs or changing careers. But before you quit, take time to research your options. Where can you learn more? Go to https://GCWpepiceweb.Lumigent Technologies. org and sign in to your ScanScout account. Enter R994 in the iCouch box to learn more about \"Work-Life Balance: Care Instructions. \"     If you do not have an account, please click on the \"Sign Up Now\" link. Current as of: December 16, 2019               Content Version: 12.6  © 2290-9414 [employer]. Care instructions adapted under license by [facility] ([facility]). If you have questions about a medical condition or this instruction, always ask your healthcare professional. [address] any warranty or liability for your use of this information. Patient Education        Learning About Stress  What is stress? Stress is what you feel when you have to handle more than you are used to. Stress is a fact of life for most people, and it affects everyone differently. What causes stress for you may not be stressful for someone else. A lot of things can cause stress. You may feel stress when you go on a job interview, take a test, or run a race. This kind of short-term stress is normal and even useful. It can help you if you need to work hard or react quickly. For example, stress can help you finish an important job on time. Stress also can last a long time. Long-term stress is caused by stressful situations or events. Examples of long-term stress include long-term health problems, ongoing problems at work, or conflicts in your family. Long-term stress can harm your health. How does stress affect your health? When you are stressed, your body responds as though you are in danger. It makes hormones that speed up your heart, make you breathe faster, and give you a burst of energy. This is called the fight-or-flight stress response. If the stress is over quickly, your body goes back to normal and no harm is done. But if stress happens too often or lasts too long, it can have bad effects. Long-term stress can make you more likely to get sick, and it can make symptoms of some diseases worse. If you tense up when you are stressed, you may develop neck, shoulder, or low back pain. Stress is linked to high blood pressure and heart disease. Stress also harms your emotional health. It can make you cardona, tense, or depressed. Your relationships may suffer, and you may not do well at work or school. What can you do to manage stress? How to relax your mind   · Write. It may help to write about things that are bothering you. This helps you find out how much stress you feel and what is causing it. When you know this, you can find better ways to cope. · Let your feelings out. Talk, laugh, cry, and express anger when you need to. Talking with friends, family, a counselor, or a member of the clergy about your feelings is a healthy way to relieve stress. · Do something you enjoy. For example, listen to music or go to a movie. Practice your hobby or do volunteer work. · Meditate. This can help you relax, because you are not worrying about what happened before or what may happen in the future. · Do guided imagery. Imagine yourself in any setting that helps you feel calm. You can use audiotapes, books, or a teacher to guide you. How to relax your body   · Do something active. Exercise or activity can help reduce stress. Walking is a great way to get started. Even everyday activities such as housecleaning or yard work can help. · Do breathing exercises. For example:  ? From a standing position, bend forward from the waist with your knees slightly bent. Let your arms dangle close to the floor. ? Breathe in slowly and deeply as you return to a standing position. Roll up slowly and lift your head last.  ? Hold your breath for just a few seconds in the standing position. ? Breathe out slowly and bend forward from the waist.  · Try yoga or trevor chi. These techniques combine exercise and meditation. You may need some training at first to learn them. What can you do to prevent stress? · Manage your time. This helps you find time to do the things you want and need to do. · Get enough sleep. Your body recovers from the stresses of the day while you are sleeping. · Get support. Your family, friends, and community can make a difference in how you experience stress. Where can you learn more? Go to https://elmira.InThrMa. org and sign in to your Meet My Friends account. Enter N312 in the Terranova box to learn more about \"Learning About Stress. \"     If you do not have an account, please click on the \"Sign Up Now\" link. You can use a smartphone celi or a video to lead you through the process. You use all of your senses in guided imagery. For example, if you want a tropical setting, you can imagine the warm breeze on your skin, the bright blue of the water, the sound of the surf, the sweet scent of tropical flowers, and the taste of coconut. Imagining those things can make you actually feel like you're there. But you don't have to imagine the tropics to feel peace. Guided imagery can take you to your own restful place. To give guided imagery a try, follow these steps:  · Lean back comfortably in your chair. If you can, close your eyes. Put your arms on the armrests, or fold your hands in your lap. · Take a deep breath through your nose. Breathe in slowly, and then let the air out completely through your mouth. · Do it again slowly. Keep breathing like this. Gather up any tension in your body, and send it out with every breath. · Let a feeling of warmth spread from your lungs to your neck and head, down your arms to your fingertips, through your body and into your legs, all the way down to your toes. Stay this way for a moment. · Now imagine a pleasant day. You're at a park or at a place you've visited in the past where you felt at peace. · In your mind's eye, look at what lies in front of you. Maybe you see the sun, filtered through trees. Maybe clouds are drifting by. · Look to one side, and then the other. Notice the feel of the air around you. Notice how it feels on your face and on your arms. · Stay here for a while. Let it become real for you. Follow-up care is a key part of your treatment and safety. Be sure to make and go to all appointments, and call your doctor if you are having problems. It's also a good idea to know your test results and keep a list of the medicines you take. Where can you learn more? Go to https://chpepiceweb.Rad. org and sign in to your [employer] account. Enter N291 in the AbCelex Technologieshire box to learn more about \"Learning About Guided Imagery for Stress. \"     If you do not have an account, please click on the \"Sign Up Now\" link. Current as of: December 16, 2019               Content Version: 12.6  © 0940-2958 [employer]. Care instructions adapted under license by [facility] ([facility]). If you have questions about a medical condition or this instruction, always ask your healthcare professional. [place] [address] any warranty or liability for your use of this information. Patient Education        Learning About Mindfulness for Stress  What are mindfulness and stress? Stress is what you feel when you have to handle more than you are used to. A lot of things can cause stress. You may feel stress when you go on a job interview, take a test, or run a race. This kind of short-term stress is normal and even useful. It can help you if you need to work hard or react quickly. Stress also can last a long time. Long-term stress is caused by stressful situations or events. Examples of long-term stress include long-term health problems, ongoing problems at work, and conflicts in your family. Long-term stress can harm your health. Mindfulness is a focus only on things happening in the present moment. It's a process of purposefully paying attention to and being aware of your surroundings, your emotions, your thoughts, and how your body feels. You are aware of these things, but you aren't judging these experiences as \"good\" or \"bad. \" Mindfulness can help you learn to calm your mind and body to help you cope with illness, pain, and stress. How does mindfulness help to relieve stress? Mindfulness can help quiet your mind and relax your body. Studies show that it can help some people sleep better, feel less anxious, and bring their blood pressure down. And it's been shown to help some people live and cope better with certain health problems like heart disease, depression, chronic pain, and cancer. How do you practice mindfulness? To be mindful is to pay attention, to be present, and to be accepting. · When you're mindful, you do just one thing and you pay close attention to that one thing. For example, you may sit quietly and notice your emotions or how your food tastes and smells. · When you're present, you focus on the things that are happening right now. You let go of your thoughts about the past and the future. When you dwell on the past or the future, you miss moments that can heal and strengthen you. You may miss moments like hearing a child laugh or seeing a friendly face when you think you're all alone. · When you're accepting, you don't  the present moment. Instead you accept your thoughts and feelings as they come. You can practice anytime, anywhere, and in any way you choose. You can practice in many ways. Here are a few ideas:  · While doing your chores, like washing the dishes, let your mind focus on what's in your hand. What does the dish feel like? Is the water warm or cold? · Go outside and take a few deep breaths. What is the air like? Is it warm or cold? · When you can, take some time at the start of your day to sit alone and think. · Take a slow walk by yourself. Count your steps while you breathe in and out. · Try yoga breathing exercises, stretches, and poses to strengthen and relax your muscles. · At work, if you can, try to stop for a few moments each hour. Note how your body feels. Let yourself regroup and let your mind settle before you return to what you were doing. · If you struggle with anxiety or \"worry thoughts,\" imagine your mind as a blue cooper and your worry thoughts as clouds. Now imagine those worry thoughts floating across your mind's cooper. Just let them pass by as you watch. Follow-up care is a key part of your treatment and safety. Be sure to make and go to all appointments, and call your doctor if you are having problems. It's also a good idea to know your test results and keep a list of the medicines you take. Where can you learn more? Go to [email or web address] and sign in to your [employer] account. Enter W226 in the [employer] box to learn more about \"Learning About Mindfulness for Stress. \"     If you do not have an account, please click on the \"Sign Up Now\" link. Current as of: December 16, 2019               Content Version: 12.6  © 8539-6055 [employer]. Care instructions adapted under license by [facility] ([facility]). If you have questions about a medical condition or this instruction, always ask your healthcare professional. [place] [address] any warranty or liability for your use of this information. Patient Education        Learning About Progressive Muscle Relaxation for Stress  What are progressive muscle relaxation and stress? Stress is what you feel when you have to handle more than you are used to. A lot of things can cause stress. You may feel stress when you go on a job interview, take a test, or run a race. This kind of short-term stress is normal and even useful. It can help you if you need to work hard or react quickly. Stress also can last a long time. Long-term stress is caused by stressful situations or events. Examples of long-term stress include long-term health problems, ongoing problems at work, and conflicts in your family. Long-term stress can harm your health. When you have anxiety or stress in your life, one of the ways your body responds is with muscle tension. Progressive muscle relaxation is a method that helps relieve that tension. How does progressive muscle relaxation reduce stress? The body responds to stress with muscle tension, which can cause pain or discomfort. In turn, tense muscles relay to the body that it's stressed. That keeps the cycle of stress and muscle tension going. Progressive muscle relaxation helps break this cycle by reducing muscle tension and general mental anxiety. This method often helps people get to sleep. How do you do progressive muscle relaxation? To do progressive muscle relaxation, first you tense a group of muscles as you breathe in. Then you relax them as you breathe out. Notice how your muscles feel when you relax them. You work on your muscle groups in a certain order. Through practice, you can learn to feel the difference between a tensed muscle and a relaxed muscle. Then you can learn how to turn on this relaxed state at the first sign of a muscle tensing up due to stress. Practicing this method for a few weeks will help you get better at this skill. In time you'll be able to use this method to relieve stress. When you first start, it may help to use an audio recording until you learn all the muscle groups in order. Check online for these recordings. Choose a place where you won't be interrupted. It should have space for you to lie down on your back and stretch out comfortably, such as on a carpeted floor. Follow-up care is a key part of your treatment and safety. Be sure to make and go to all appointments, and call your doctor if you are having problems. It's also a good idea to know your test results and keep a list of the medicines you take. Where can you learn more? Go to [email or web address] and sign in to your [employer] account. Enter Z009 in the [employer] box to learn more about \"Learning About Progressive Muscle Relaxation for Stress. \"     If you do not have an account, please click on the \"Sign Up Now\" link. Current as of: December 16, 2019               Content Version: 12.6  © 0032-1795 [employer]. Care instructions adapted under license by [employer]. If you have questions about a medical condition or this instruction, always ask your healthcare professional. [address] any warranty or liability for your use of this information.

## 2021-01-03 ASSESSMENT — ENCOUNTER SYMPTOMS
DIARRHEA: 0
TROUBLE SWALLOWING: 0
VOMITING: 0
WHEEZING: 0
CONSTIPATION: 0
SHORTNESS OF BREATH: 0
CHEST TIGHTNESS: 0
ABDOMINAL PAIN: 0
COUGH: 0
NAUSEA: 0

## 2021-01-06 ENCOUNTER — APPOINTMENT (OUTPATIENT)
Dept: CT IMAGING | Age: 36
End: 2021-01-06
Payer: COMMERCIAL

## 2021-01-06 ENCOUNTER — HOSPITAL ENCOUNTER (EMERGENCY)
Age: 36
Discharge: HOME | End: 2021-01-06
Payer: COMMERCIAL

## 2021-01-06 VITALS
OXYGEN SATURATION: 95 % | TEMPERATURE: 97.3 F | SYSTOLIC BLOOD PRESSURE: 158 MMHG | DIASTOLIC BLOOD PRESSURE: 83 MMHG | WEIGHT: 283 LBS | BODY MASS INDEX: 50.13 KG/M2 | HEART RATE: 74 BPM | RESPIRATION RATE: 16 BRPM

## 2021-01-06 DIAGNOSIS — R10.31 ABDOMINAL PAIN, RIGHT LOWER QUADRANT: Primary | ICD-10-CM

## 2021-01-06 DIAGNOSIS — R11.2 NAUSEA AND VOMITING, INTRACTABILITY OF VOMITING NOT SPECIFIED, UNSPECIFIED VOMITING TYPE: ICD-10-CM

## 2021-01-06 LAB
A/G RATIO: 1.4 (ref 1.1–2.2)
ALBUMIN SERPL-MCNC: 4.5 G/DL (ref 3.4–5)
ALP BLD-CCNC: 146 U/L (ref 40–129)
ALT SERPL-CCNC: 14 U/L (ref 10–40)
ANION GAP SERPL CALCULATED.3IONS-SCNC: 11 MMOL/L (ref 3–16)
AST SERPL-CCNC: 16 U/L (ref 15–37)
BACTERIA: ABNORMAL /HPF
BASOPHILS ABSOLUTE: 0.1 K/UL (ref 0–0.2)
BASOPHILS RELATIVE PERCENT: 0.7 %
BILIRUB SERPL-MCNC: 0.4 MG/DL (ref 0–1)
BILIRUBIN URINE: NEGATIVE
BLOOD, URINE: ABNORMAL
BUN BLDV-MCNC: 14 MG/DL (ref 7–20)
CALCIUM SERPL-MCNC: 9.8 MG/DL (ref 8.3–10.6)
CHLORIDE BLD-SCNC: 102 MMOL/L (ref 99–110)
CLARITY: ABNORMAL
CO2: 25 MMOL/L (ref 21–32)
COLOR: ABNORMAL
CREAT SERPL-MCNC: 0.6 MG/DL (ref 0.6–1.1)
EOSINOPHILS ABSOLUTE: 0.1 K/UL (ref 0–0.6)
EOSINOPHILS RELATIVE PERCENT: 1.3 %
EPITHELIAL CELLS, UA: ABNORMAL /HPF (ref 0–5)
GFR AFRICAN AMERICAN: >60
GFR NON-AFRICAN AMERICAN: >60
GLOBULIN: 3.3 G/DL
GLUCOSE BLD-MCNC: 104 MG/DL (ref 70–99)
GLUCOSE URINE: NEGATIVE MG/DL
HCG(URINE) PREGNANCY TEST: NEGATIVE
HCT VFR BLD CALC: 41.9 % (ref 36–48)
HEMOGLOBIN: 13.9 G/DL (ref 12–16)
KETONES, URINE: ABNORMAL MG/DL
LEUKOCYTE ESTERASE, URINE: NEGATIVE
LIPASE: 18 U/L (ref 13–60)
LYMPHOCYTES ABSOLUTE: 2.6 K/UL (ref 1–5.1)
LYMPHOCYTES RELATIVE PERCENT: 22.9 %
MCH RBC QN AUTO: 30.2 PG (ref 26–34)
MCHC RBC AUTO-ENTMCNC: 33 G/DL (ref 31–36)
MCV RBC AUTO: 91.4 FL (ref 80–100)
MICROSCOPIC EXAMINATION: YES
MONOCYTES ABSOLUTE: 0.7 K/UL (ref 0–1.3)
MONOCYTES RELATIVE PERCENT: 5.8 %
NEUTROPHILS ABSOLUTE: 7.7 K/UL (ref 1.7–7.7)
NEUTROPHILS RELATIVE PERCENT: 69.3 %
NITRITE, URINE: NEGATIVE
PDW BLD-RTO: 12.9 % (ref 12.4–15.4)
PH UA: 5.5 (ref 5–8)
PLATELET # BLD: 366 K/UL (ref 135–450)
PMV BLD AUTO: 8.8 FL (ref 5–10.5)
POTASSIUM REFLEX MAGNESIUM: 4.3 MMOL/L (ref 3.5–5.1)
PROTEIN UA: 30 MG/DL
RBC # BLD: 4.59 M/UL (ref 4–5.2)
RBC UA: ABNORMAL /HPF (ref 0–4)
SODIUM BLD-SCNC: 138 MMOL/L (ref 136–145)
SPECIFIC GRAVITY UA: 1.03 (ref 1–1.03)
TOTAL PROTEIN: 7.8 G/DL (ref 6.4–8.2)
URINE REFLEX TO CULTURE: ABNORMAL
URINE TYPE: ABNORMAL
UROBILINOGEN, URINE: 0.2 E.U./DL
WBC # BLD: 11.2 K/UL (ref 4–11)
WBC UA: ABNORMAL /HPF (ref 0–5)

## 2021-01-06 PROCEDURE — 2580000003 HC RX 258: Performed by: PHYSICIAN ASSISTANT

## 2021-01-06 PROCEDURE — 99283 EMERGENCY DEPT VISIT LOW MDM: CPT

## 2021-01-06 PROCEDURE — 83690 ASSAY OF LIPASE: CPT

## 2021-01-06 PROCEDURE — 84703 CHORIONIC GONADOTROPIN ASSAY: CPT

## 2021-01-06 PROCEDURE — 80053 COMPREHEN METABOLIC PANEL: CPT

## 2021-01-06 PROCEDURE — 6370000000 HC RX 637 (ALT 250 FOR IP): Performed by: PHYSICIAN ASSISTANT

## 2021-01-06 PROCEDURE — 96375 TX/PRO/DX INJ NEW DRUG ADDON: CPT

## 2021-01-06 PROCEDURE — 6360000004 HC RX CONTRAST MEDICATION: Performed by: PHYSICIAN ASSISTANT

## 2021-01-06 PROCEDURE — 96374 THER/PROPH/DIAG INJ IV PUSH: CPT

## 2021-01-06 PROCEDURE — 81001 URINALYSIS AUTO W/SCOPE: CPT

## 2021-01-06 PROCEDURE — 85025 COMPLETE CBC W/AUTO DIFF WBC: CPT

## 2021-01-06 PROCEDURE — 74177 CT ABD & PELVIS W/CONTRAST: CPT

## 2021-01-06 PROCEDURE — 6360000002 HC RX W HCPCS: Performed by: PHYSICIAN ASSISTANT

## 2021-01-06 RX ORDER — ONDANSETRON 4 MG/1
4 TABLET, FILM COATED ORAL EVERY 8 HOURS PRN
Qty: 20 TABLET | Refills: 0 | Status: SHIPPED | OUTPATIENT
Start: 2021-01-06 | End: 2021-01-12 | Stop reason: ALTCHOICE

## 2021-01-06 RX ORDER — HYDROCODONE BITARTRATE AND ACETAMINOPHEN 5; 325 MG/1; MG/1
1 TABLET ORAL ONCE
Status: COMPLETED | OUTPATIENT
Start: 2021-01-06 | End: 2021-01-06

## 2021-01-06 RX ORDER — NAPROXEN 500 MG/1
500 TABLET ORAL 2 TIMES DAILY PRN
Qty: 60 TABLET | Refills: 0 | Status: SHIPPED | OUTPATIENT
Start: 2021-01-06 | End: 2021-01-12

## 2021-01-06 RX ORDER — PROCHLORPERAZINE EDISYLATE 5 MG/ML
10 INJECTION INTRAMUSCULAR; INTRAVENOUS ONCE
Status: COMPLETED | OUTPATIENT
Start: 2021-01-06 | End: 2021-01-06

## 2021-01-06 RX ORDER — KETOROLAC TROMETHAMINE 30 MG/ML
15 INJECTION, SOLUTION INTRAMUSCULAR; INTRAVENOUS EVERY 6 HOURS PRN
Status: DISCONTINUED | OUTPATIENT
Start: 2021-01-06 | End: 2021-01-06 | Stop reason: HOSPADM

## 2021-01-06 RX ORDER — DIPHENHYDRAMINE HYDROCHLORIDE 50 MG/ML
25 INJECTION INTRAMUSCULAR; INTRAVENOUS ONCE
Status: COMPLETED | OUTPATIENT
Start: 2021-01-06 | End: 2021-01-06

## 2021-01-06 RX ORDER — ONDANSETRON 2 MG/ML
4 INJECTION INTRAMUSCULAR; INTRAVENOUS EVERY 6 HOURS PRN
Status: DISCONTINUED | OUTPATIENT
Start: 2021-01-06 | End: 2021-01-06 | Stop reason: HOSPADM

## 2021-01-06 RX ORDER — 0.9 % SODIUM CHLORIDE 0.9 %
1000 INTRAVENOUS SOLUTION INTRAVENOUS ONCE
Status: COMPLETED | OUTPATIENT
Start: 2021-01-06 | End: 2021-01-06

## 2021-01-06 RX ADMIN — DIPHENHYDRAMINE HYDROCHLORIDE 25 MG: 50 INJECTION, SOLUTION INTRAMUSCULAR; INTRAVENOUS at 11:28

## 2021-01-06 RX ADMIN — IOPAMIDOL 75 ML: 755 INJECTION, SOLUTION INTRAVENOUS at 08:47

## 2021-01-06 RX ADMIN — ONDANSETRON 4 MG: 2 INJECTION INTRAMUSCULAR; INTRAVENOUS at 08:05

## 2021-01-06 RX ADMIN — PROCHLORPERAZINE EDISYLATE 10 MG: 5 INJECTION INTRAMUSCULAR; INTRAVENOUS at 11:27

## 2021-01-06 RX ADMIN — KETOROLAC TROMETHAMINE 15 MG: 30 INJECTION, SOLUTION INTRAMUSCULAR at 08:05

## 2021-01-06 RX ADMIN — HYDROCODONE BITARTRATE AND ACETAMINOPHEN 1 TABLET: 5; 325 TABLET ORAL at 11:27

## 2021-01-06 RX ADMIN — SODIUM CHLORIDE 1000 ML: 9 INJECTION, SOLUTION INTRAVENOUS at 08:05

## 2021-01-06 RX ADMIN — KETOROLAC TROMETHAMINE 15 MG: 30 INJECTION, SOLUTION INTRAMUSCULAR at 09:33

## 2021-01-06 ASSESSMENT — PAIN SCALES - GENERAL
PAINLEVEL_OUTOF10: 7

## 2021-01-06 ASSESSMENT — ENCOUNTER SYMPTOMS
NAUSEA: 1
VOMITING: 1
RESPIRATORY NEGATIVE: 1
ABDOMINAL PAIN: 1

## 2021-01-06 NOTE — ED NOTES
Pt resting in bed at this time. No acute signs of distress noted. Resp easy and unlabored. Alert and oriented x 4. Skin warm, dry and well perfused. Appropriate bed rails up and call light within reach. Pt denies any needs at this time. Pt to ER reporting RLQ abdominal pain that woke her up from her sleep around midnight last night. States she feels nauseated and diaphoretic. No problems with bowel or bladder, no abnormal vaginal bleeding or discharge. IV established, labs and urine sent, meds given - see MAR.  Currently awaiting CT and MD serrano-gurmeet.     Cherelle Casas, RN  01/06/21 0343

## 2021-01-09 NOTE — PROGRESS NOTES
Linda Neumann   30 y.o. female   1985    Virtual visit encounter type:   [x] Doxy. me  [] Telephone w/o video  [] MyChart  [] Other: This patient was last seen by me on 7/27/2020.    -She was prescribed venlafaxine for anxiety/depression and migraine prophylaxis, but she ended up discontinuing it in September due to trying to get pregnant. She tapered off the medication as directed by me and has been off it since around end of September. HPI:  Chief Complaint   Patient presents with    Follow-Up from Hospital     ED on 1/6. [facility].  Abdominal Pain    Nausea    Emesis     She was seen at [facility] ER on 1/6/2021. She went there for RLQ abdominal pain that started about 7 hours or so prior to her arrival.  She also reported of \"dark urine\" and watery stools. UA was remarkable for moderate amount of blood, proteinuria of 30, and trace ketones. Lab work was remarkable for mildly elevated WBC of 11.2 with normal differential, elevated alkaline phosphatase of 146 with normal liver enzymes and albumin, and lipase was normal.  UPT was negative. She has CT abdomen pelvis without contrast and it was overall unremarkable except for a 3.1 cm hemangioma of the left hepatic lobe. She was given 1 L bolus normal saline, Compazine, Benadryl, and 1 tablet of Norco.  She was discharged on 20 tablets of Zofran 4 mg and 60 tablets of naproxen 500 mg. She saw my partner (Nathan Schrader) on 12/29/2020. She was prescribed amitriptyline for acute non-intractable tension type headache as well as ibuprofen and famotidine for dyspepsia. She's unsure of the cause her symptoms, but thought it may be related to having a panic attack. Her symptoms have completely resolved. She reported of stress from working at home. Needs help with childcare.     Allergies   Allergen Reactions    Bactrim [Sulfamethoxazole-Trimethoprim]     Cephalexin     Macrobid [Nitrofurantoin Macrocrystal] Hives  Maxalt [Rizatriptan]        Current Outpatient Medications on File Prior to Visit   Medication Sig Dispense Refill    VITAMIN D PO Take by mouth      levothyroxine (SYNTHROID) 50 MCG tablet       Omega-3 Fatty Acids (FISH OIL) 500 MG CAPS Take 1 capsule by mouth daily      Prenatal Vit-Fe Fumarate-FA (PRENATAL 19 PO) Take 1 tablet by mouth daily      Coenzyme Q10 (COQ10) 100 MG CAPS Take 1 tablet PO three times a day 90 capsule 11    vitamin C (ASCORBIC ACID) 500 MG tablet Take 1 tablet by mouth daily 90 tablet 3     No current facility-administered medications on file prior to visit. Family History   Problem Relation Age of Onset    High Blood Pressure Mother     Depression Mother     Diabetes Mother     Anxiety Disorder Mother     High Blood Pressure Father     Breast Cancer Maternal Aunt     Cancer Maternal Aunt     Breast Cancer Paternal Grandmother      Social History     Tobacco Use    Smoking status: Former Smoker     Quit date: 1/1/2018     Years since quitting: 3.0    Smokeless tobacco: Never Used   Substance Use Topics    Alcohol use: Not Currently     Comment: social      Review of Systems   Constitutional: Negative for activity change, appetite change, fatigue, fever and unexpected weight change. HENT: Negative for congestion, rhinorrhea, sinus pressure and trouble swallowing. Respiratory: Negative for cough, chest tightness, shortness of breath and wheezing. Cardiovascular: Negative for chest pain, palpitations and leg swelling. Gastrointestinal: Negative for abdominal distention, abdominal pain, blood in stool, constipation, diarrhea, nausea and vomiting. Genitourinary: Negative for dysuria, frequency and hematuria. Musculoskeletal: Negative for arthralgias and back pain. Skin: Negative for rash. Neurological: Negative for dizziness, weakness, light-headedness, numbness and headaches.         Wt Readings from Last 3 Encounters:   01/06/21 283 lb (128.4 kg) -     GAMMA GT; Future  -     HEPATIC FUNCTION PANEL; Future  -     C-REACTIVE PROTEIN; Future  -     Sedimentation Rate; Future    Generalized anxiety disorder with panic attacks  Comments:  Patient and  are set in trying again for another child. Will hold off on anti-depressant therapy. Advised patient to speak to     Subclinical hypothyroidism  -     TSH without Reflex; Future  -     T4, Free; Future      I reviewed the plan of care with the patient. Patient acknowledged understanding and agreed with plan of care overall.     Medications Discontinued During This Encounter   Medication Reason    famotidine (PEPCID) 20 MG tablet Therapy completed    amitriptyline (ELAVIL) 10 MG tablet Therapy completed    ibuprofen (ADVIL;MOTRIN) 600 MG tablet Therapy completed    hydrOXYzine (VISTARIL) 25 MG capsule LIST CLEANUP    naproxen (NAPROSYN) 500 MG tablet LIST CLEANUP    venlafaxine 37.5 MG extended release tablet LIST CLEANUP    ondansetron (ZOFRAN) 4 MG tablet Therapy completed Patient was informed that whether starting or adding a new medication or increasing the dose of a current medication, the benefits and risks have to be considered and weighed over, especially if the patient is taking other medications as well. Patient was also informed that there are no medications that have no side effects and there is always a risk involved with taking a medication. If a side effect were to occur with starting a new medication or with increasing the dose of a current medication that either the medication can be totally discontinued altogether or simply decrease the dose of it and based on this, a follow-up appointment is necessary. The drug allergy list will then be updated with the corresponding side effects if it's deemed to be a true 'drug allergy'. The most common adverse effects of medication(s) were addressed at today's visit. Lastly, the patient was informed that the coverage status of a medication may vary from insurance to insurance and the only way to verify if the medication is covered is to send an actual prescription in. The patient was also informed that the cost of medications vary from insurance to insurance. Estimated length of time of today's visit: 25 minutes    Follow-up: Return if symptoms worsen or fail to improve. . Patient was informed that if his or her symptoms worsen to return here or go to nearest ER if symptoms significantly worsen.      General disclaimer regarding telemedicine (virtual) visits: Rojas Enrique is a 30 y.o. female is being evaluated by a virtual visit (video visit) and/or telephone (without video) encounter to address their concern(s) as mentioned above. Prior to arranging this appointment, the patient was made aware of the need and importance to schedule the visit in this manner given the current ongoing COVID-19 pandemic. The patient was also made aware that the telemedicine service used (e.g.doxy. me) would not save let alone record any information (audio, video, and text) regarding the actual virtual visit. After this discussion, the patient acknowledged understanding and agreed to today's virtual visit encounter. A caregiver was present when appropriate as well as an  if requested. Due to this being a TeleHealth encounter (during the OQGCT-37 public health emergency), evaluation of the following organ systems was overall limited: Vitals/Constitutional/EENT/Resp/CV/GI//MS/Neuro/Skin/Heme-Lymph-Imm. As a result, establishing a diagnosis(s) and formulating a plan of care may be overall more difficult and complicated compared to a conventional (face-to-face) office visit. The patient was made aware about the potential limitations and difficulties of a telemedicine visit such as having technical difficulties related to video and/or audio issues often stemming from a sub-optimal/poor Internet or cellular data connection and/or other factors. If this is judged to be the case then the patient would be informed if deemed relevant and necessary that an in-person follow-up visit may be recommended. Pursuant to the emergency declaration under the [facility], [address] [place] [address] authority and the [employer] Act, this virtual visit was conducted with the patient's (and/or legal guardian's) consent, to reduce the patient's risk (as well as others) of exposure to COVID-19 and to continue to maintain and provide necessary medical care. The patient (and/or legal guardian) has also been advised to contact this office for worsening conditions or problems, and seek emergency medical treatment and/or call 911 if deemed necessary. Services were provided through either a video synchronous discussion virtually or via telephone (without video) or combination of both to substitute for in-person clinic visit. Patient and provider were located at their individual home(s) or their most convenient location at the time of visit. Regarding my note: This note was composed to the best of my knowledge and recollection of the encounter with the patient using one of my own customized note templates utilizing a combination of typing and dictating with the [address] [place] [address] speech recognition software. As a result, the note may possibly have various errors (e.g. spelling, grammar, and non-sensible words/phrases/statements) despite reviewing the note prior to signing it for completion. It is worth noting that most of the time, notes are completed usually long after the patient is seen and are strived to be completed in a timely fashion (ideally within 72 hours of the patient encounter). It is also worth noting that healthcare providers often see several patients a day (e.g. > 15-30 patients/day) in either the outpatient and/or inpatient setting(s). In addition, healthcare providers spend a significant amount of time reviewing multiple patients' charts in preparation for their future encounters (pre-charting) as well as time spent reviewing any labs, imaging, specialist's notes (if relevant), and other documents (e.g. recent ER visits or hospital stays or completing forms such as FMLA, short-term/long-term disability), etc.  Time and effort is also allocated to coordinating with office staff to make sure various things are completed as part of the day-to-day office management responsibilities. Given all this, it is worth pointing out that not every detail can be remembered and not everything discussed is considered relevant, significant, or noteworthy. If one has any questions or concerns about my given note, please take into consideration all these aforementioned things before contacting. Ricci Villarreal M.D.   [address] [facility]    Electronically signed by Piedad Dale on 1/12/2021 at 12:04 PM.

## 2021-01-11 ENCOUNTER — HOSPITAL ENCOUNTER (OUTPATIENT)
Age: 36
Discharge: HOME | End: 2021-01-11
Payer: COMMERCIAL

## 2021-01-11 ENCOUNTER — VIRTUAL VISIT (OUTPATIENT)
Dept: FAMILY MEDICINE CLINIC | Age: 36
End: 2021-01-11
Payer: COMMERCIAL

## 2021-01-11 DIAGNOSIS — F41.1 GENERALIZED ANXIETY DISORDER WITH PANIC ATTACKS: ICD-10-CM

## 2021-01-11 DIAGNOSIS — R74.8 ELEVATED ALKALINE PHOSPHATASE LEVEL: Primary | ICD-10-CM

## 2021-01-11 DIAGNOSIS — E03.8 SUBCLINICAL HYPOTHYROIDISM: ICD-10-CM

## 2021-01-11 DIAGNOSIS — R74.8 ELEVATED ALKALINE PHOSPHATASE LEVEL: ICD-10-CM

## 2021-01-11 DIAGNOSIS — F41.0 GENERALIZED ANXIETY DISORDER WITH PANIC ATTACKS: ICD-10-CM

## 2021-01-11 LAB
ALBUMIN SERPL-MCNC: 4.5 G/DL (ref 3.4–5)
ALP BLD-CCNC: 119 U/L (ref 40–129)
ALT SERPL-CCNC: 9 U/L (ref 10–40)
AST SERPL-CCNC: 14 U/L (ref 15–37)
BILIRUB SERPL-MCNC: <0.2 MG/DL (ref 0–1)
BILIRUBIN DIRECT: <0.2 MG/DL (ref 0–0.3)
BILIRUBIN, INDIRECT: ABNORMAL MG/DL (ref 0–1)
C-REACTIVE PROTEIN: 8.5 MG/L (ref 0–5.1)
GAMMA GLUTAMYL TRANSFERASE: 40 U/L (ref 5–36)
SEDIMENTATION RATE, ERYTHROCYTE: 28 MM/HR (ref 0–20)
T4 FREE: 0.9 NG/DL (ref 0.9–1.8)
TOTAL PROTEIN: 7.5 G/DL (ref 6.4–8.2)
TSH SERPL DL<=0.05 MIU/L-ACNC: 3.04 UIU/ML (ref 0.27–4.2)

## 2021-01-11 PROCEDURE — 82977 ASSAY OF GGT: CPT

## 2021-01-11 PROCEDURE — 86140 C-REACTIVE PROTEIN: CPT

## 2021-01-11 PROCEDURE — 80076 HEPATIC FUNCTION PANEL: CPT

## 2021-01-11 PROCEDURE — 84439 ASSAY OF FREE THYROXINE: CPT

## 2021-01-11 PROCEDURE — 85652 RBC SED RATE AUTOMATED: CPT

## 2021-01-11 PROCEDURE — 99213 OFFICE O/P EST LOW 20 MIN: CPT | Performed by: FAMILY MEDICINE

## 2021-01-11 PROCEDURE — 84443 ASSAY THYROID STIM HORMONE: CPT

## 2021-01-11 PROCEDURE — 36415 COLL VENOUS BLD VENIPUNCTURE: CPT

## 2021-01-11 ASSESSMENT — PATIENT HEALTH QUESTIONNAIRE - PHQ9
SUM OF ALL RESPONSES TO PHQ QUESTIONS 1-9: 1
SUM OF ALL RESPONSES TO PHQ9 QUESTIONS 1 & 2: 1
2. FEELING DOWN, DEPRESSED OR HOPELESS: 1
SUM OF ALL RESPONSES TO PHQ QUESTIONS 1-9: 1
1. LITTLE INTEREST OR PLEASURE IN DOING THINGS: 0

## 2021-01-12 ASSESSMENT — ENCOUNTER SYMPTOMS
NAUSEA: 0
ABDOMINAL DISTENTION: 0
DIARRHEA: 0
CHEST TIGHTNESS: 0
BLOOD IN STOOL: 0
TROUBLE SWALLOWING: 0
SHORTNESS OF BREATH: 0
WHEEZING: 0
BACK PAIN: 0
SINUS PRESSURE: 0
CONSTIPATION: 0
VOMITING: 0
RHINORRHEA: 0
COUGH: 0
ABDOMINAL PAIN: 0

## 2021-01-14 ENCOUNTER — VIRTUAL VISIT (OUTPATIENT)
Dept: FAMILY MEDICINE CLINIC | Age: 36
End: 2021-01-14
Payer: COMMERCIAL

## 2021-01-14 ENCOUNTER — NURSE TRIAGE (OUTPATIENT)
Dept: OTHER | Facility: CLINIC | Age: 36
End: 2021-01-14

## 2021-01-14 DIAGNOSIS — J35.8 TONSILLAR EXUDATE: Primary | ICD-10-CM

## 2021-01-14 DIAGNOSIS — R50.9 FEVER, UNSPECIFIED FEVER CAUSE: ICD-10-CM

## 2021-01-14 DIAGNOSIS — Z71.89 EDUCATED ABOUT COVID-19 VIRUS INFECTION: ICD-10-CM

## 2021-01-14 PROCEDURE — 99212 OFFICE O/P EST SF 10 MIN: CPT | Performed by: FAMILY MEDICINE

## 2021-01-14 RX ORDER — BENZONATATE 100 MG/1
100 CAPSULE ORAL 3 TIMES DAILY PRN
Qty: 15 CAPSULE | Refills: 1 | Status: SHIPPED | OUTPATIENT
Start: 2021-01-14 | End: 2021-11-15

## 2021-01-14 RX ORDER — LIDOCAINE HYDROCHLORIDE 20 MG/ML
10 SOLUTION OROPHARYNGEAL EVERY 12 HOURS PRN
Qty: 100 ML | Refills: 0 | Status: SHIPPED | OUTPATIENT
Start: 2021-01-14 | End: 2021-01-19

## 2021-01-14 RX ORDER — PENICILLIN V POTASSIUM 500 MG/1
500 TABLET ORAL 3 TIMES DAILY
Qty: 30 TABLET | Refills: 0 | Status: SHIPPED | OUTPATIENT
Start: 2021-01-14 | End: 2021-01-24

## 2021-01-14 RX ORDER — MELOXICAM 15 MG/1
15 TABLET ORAL DAILY PRN
Qty: 30 TABLET | Refills: 2 | Status: SHIPPED | OUTPATIENT
Start: 2021-01-14 | End: 2021-11-15

## 2021-01-14 ASSESSMENT — ENCOUNTER SYMPTOMS
DIARRHEA: 0
WHEEZING: 0
NAUSEA: 0
SHORTNESS OF BREATH: 0
VOMITING: 0
CONSTIPATION: 0
BACK PAIN: 0
VOICE CHANGE: 0
RHINORRHEA: 0
BLOOD IN STOOL: 0
ABDOMINAL PAIN: 0
ABDOMINAL DISTENTION: 0
SORE THROAT: 1
SINUS PRESSURE: 0
SINUS PAIN: 0
CHEST TIGHTNESS: 0
COUGH: 1
TROUBLE SWALLOWING: 0

## 2021-01-14 NOTE — PROGRESS NOTES
Mor Hemp   30 y.o. female   1985    Virtual visit encounter type:   [x] Doxy. me  [] Telephone w/o video  [] MyChart  [] Other: This patient was last seen by me on 1/11/2021. HPI:  Chief Complaint   Patient presents with    Fever     100.1    Pharyngitis     White Patches on Throat     Patient made a same day appointment. She reported that she started having malaise on Tuesday. She woke up yesterday morning with white patches over her tonsils. Her tonsils are mildly swollen, but they aren't touching. Her daughter was brought to her pediatrician today and was positive for bilateral AOM and strep. Daughter's symptoms started on Jan 9th. Patient reported of no dysphagia, but has odynophagia. Denied feeling of bronchoconstriction, dyspnea, wheezing, etc.  She has mild non-productive cough and fever that started today of 100.1 F. Allergies   Allergen Reactions    Bactrim [Sulfamethoxazole-Trimethoprim]     Cephalexin     Macrobid [Nitrofurantoin Macrocrystal] Hives    Maxalt [Rizatriptan]        Current Outpatient Medications on File Prior to Visit   Medication Sig Dispense Refill    VITAMIN D PO Take by mouth      levothyroxine (SYNTHROID) 50 MCG tablet       Omega-3 Fatty Acids (FISH OIL) 500 MG CAPS Take 1 capsule by mouth daily      Prenatal Vit-Fe Fumarate-FA (PRENATAL 19 PO) Take 1 tablet by mouth daily      Coenzyme Q10 (COQ10) 100 MG CAPS Take 1 tablet PO three times a day 90 capsule 11    vitamin C (ASCORBIC ACID) 500 MG tablet Take 1 tablet by mouth daily 90 tablet 3     No current facility-administered medications on file prior to visit.        Family History   Problem Relation Age of Onset    High Blood Pressure Mother     Depression Mother     Diabetes Mother     Anxiety Disorder Mother     High Blood Pressure Father     Breast Cancer Maternal Aunt     Cancer Maternal Aunt     Breast Cancer Paternal Grandmother      Social History     Tobacco Use  Smoking status: Former Smoker     Quit date: 1/1/2018     Years since quitting: 3.0    Smokeless tobacco: Never Used   Substance Use Topics    Alcohol use: Not Currently     Comment: social      Review of Systems   Constitutional: Positive for activity change, appetite change, fatigue and fever. Negative for chills and unexpected weight change. HENT: Positive for congestion and sore throat. Negative for ear discharge, ear pain, rhinorrhea, sinus pressure, sinus pain, trouble swallowing and voice change. Respiratory: Positive for cough. Negative for chest tightness, shortness of breath and wheezing. Cardiovascular: Negative for chest pain, palpitations and leg swelling. Gastrointestinal: Negative for abdominal distention, abdominal pain, blood in stool, constipation, diarrhea, nausea and vomiting. Genitourinary: Negative for dysuria, frequency and hematuria. Musculoskeletal: Negative for arthralgias and back pain. Skin: Negative for rash. Neurological: Negative for dizziness, weakness, light-headedness, numbness and headaches. Wt Readings from Last 3 Encounters:   01/06/21 283 lb (128.4 kg)   12/28/20 283 lb (128.4 kg)   04/20/20 225 lb (102.1 kg)     BP Readings from Last 3 Encounters:   01/06/21 (!) 158/83   12/28/20 118/73   02/24/20 100/70     Pulse Readings from Last 3 Encounters:   01/06/21 74   12/28/20 64   02/24/20 81       There were no vitals taken for this visit. Physical Exam  Vitals signs reviewed. Constitutional:       General: She is awake. She is not in acute distress. Appearance: Normal appearance. She is morbidly obese. She is not ill-appearing. HENT:      Head: Normocephalic and atraumatic. Right Ear: External ear normal.      Left Ear: External ear normal.      Nose: Nose normal.   Eyes:      General: No scleral icterus. Right eye: No discharge. Left eye: No discharge. Extraocular Movements: Extraocular movements intact. Conjunctiva/sclera: Conjunctivae normal.   Neck:      Musculoskeletal: Normal range of motion. No erythema. Pulmonary:      Effort: Pulmonary effort is normal. No respiratory distress. Breath sounds: No stridor. No wheezing. Abdominal:      General: There is no distension. Musculoskeletal: Normal range of motion. Skin:     Coloration: Skin is not cyanotic, jaundiced or pale. Findings: No erythema. Neurological:      General: No focal deficit present. Mental Status: She is alert and oriented to person, place, and time. Mental status is at baseline. Psychiatric:         Attention and Perception: Attention and perception normal.         Mood and Affect: Mood and affect normal.         Speech: Speech normal.         Behavior: Behavior normal. Behavior is cooperative. Thought Content: Thought content normal.        Assessment/Plan:   Jaylen Calvo was seen today for fever and pharyngitis. Diagnoses and all orders for this visit:    Tonsillar exudate  -     benzonatate (TESSALON) 100 MG capsule; Take 1 capsule by mouth 3 times daily as needed for Cough  -     meloxicam (MOBIC) 15 MG tablet; Take 1 tablet by mouth daily as needed for Pain (arthritis)  -     penicillin v potassium (VEETID) 500 MG tablet; Take 1 tablet by mouth 3 times daily for 10 days  -     lidocaine viscous hcl (XYLOCAINE) 2 % SOLN solution; Take 10 mLs by mouth every 12 hours as needed for Irritation    Fever, unspecified fever cause  Comments:  Patient requested COVID-19 testing. No known exposure.  is feeling sick too and works outside of home. Orders:  -     [id number] Amb[facility]; Future    Educated about COVID-19 virus infection          I reviewed the plan of care with the patient. Patient acknowledged understanding and agreed with plan of care overall. There are no discontinued medications. Patient was informed that whether starting or adding a new medication or increasing the dose of a current medication, the benefits and risks have to be considered and weighed over, especially if the patient is taking other medications as well. Patient was also informed that there are no medications that have no side effects and there is always a risk involved with taking a medication. If a side effect were to occur with starting a new medication or with increasing the dose of a current medication that either the medication can be totally discontinued altogether or simply decrease the dose of it and based on this, a follow-up appointment is necessary. The drug allergy list will then be updated with the corresponding side effects if it's deemed to be a true 'drug allergy'. The most common adverse effects of medication(s) were addressed at today's visit. Lastly, the patient was informed that the coverage status of a medication may vary from insurance to insurance and the only way to verify if the medication is covered is to send an actual prescription in. The patient was also informed that the cost of medications vary from insurance to insurance. Estimated length of time of today's visit: 10 minutes    Follow-up: Return if symptoms worsen or fail to improve. . Patient was informed that if his or her symptoms worsen to return here or go to nearest ER if symptoms significantly worsen.      General disclaimer regarding telemedicine (virtual) visits: Mor Ruiz is a 30 y.o. female is being evaluated by a virtual visit (video visit) and/or telephone (without video) encounter to address their concern(s) as mentioned above. Prior to arranging this appointment, the patient was made aware of the need and importance to schedule the visit in this manner given the current ongoing COVID-19 pandemic. The patient was also made aware that the telemedicine service used (e.g.doxy. me) would not save let alone record any information (audio, video, and text) regarding the actual virtual visit. After this discussion, the patient acknowledged understanding and agreed to today's virtual visit encounter. A caregiver was present when appropriate as well as an  if requested. Due to this being a TeleHealth encounter (during the DZCKH-49 public health emergency), evaluation of the following organ systems was overall limited: Vitals/Constitutional/EENT/Resp/CV/GI//MS/Neuro/Skin/Heme-Lymph-Imm. As a result, establishing a diagnosis(s) and formulating a plan of care may be overall more difficult and complicated compared to a conventional (face-to-face) office visit. The patient was made aware about the potential limitations and difficulties of a telemedicine visit such as having technical difficulties related to video and/or audio issues often stemming from a sub-optimal/poor Internet or cellular data connection and/or other factors. If this is judged to be the case then the patient would be informed if deemed relevant and necessary that an in-person follow-up visit may be recommended. Pursuant to the emergency declaration under the [facility], [address] [place] [address] authority and the [employer] and [employer] Act, this virtual visit was conducted with the patient's (and/or legal guardian's) consent, to reduce the patient's risk (as well as others) of exposure to COVID-19 and to continue to maintain and provide necessary medical care. The patient (and/or legal guardian) has also been advised to contact this office for worsening conditions or problems, and seek emergency medical treatment and/or call 911 if deemed necessary. Services were provided through either a video synchronous discussion virtually or via telephone (without video) or combination of both to substitute for in-person clinic visit. Patient and provider were located at their individual home(s) or their most convenient location at the time of visit. Regarding my note: This note was composed to the best of my knowledge and recollection of the encounter with the patient using one of my own customized note templates utilizing a combination of typing and dictating with the [address] [place] [address] speech recognition software. As a result, the note may possibly have various errors (e.g. spelling, grammar, and non-sensible words/phrases/statements) despite reviewing the note prior to signing it for completion. It is worth noting that most of the time, notes are completed usually long after the patient is seen and are strived to be completed in a timely fashion (ideally within 72 hours of the patient encounter). It is also worth noting that healthcare providers often see several patients a day (e.g. > 15-30 patients/day) in either the outpatient and/or inpatient setting(s). In addition, healthcare providers spend a significant amount of time reviewing multiple patients' charts in preparation for their future encounters (pre-charting) as well as time spent reviewing any labs, imaging, specialist's notes (if relevant), and other documents (e.g. recent ER visits or hospital stays or completing forms such as FMLA, short-term/long-term disability), etc.  Time and effort is also allocated to coordinating with office staff to make sure various things are completed as part of the day-to-day office management responsibilities. Given all this, it is worth pointing out that not every detail can be remembered and not everything discussed is considered relevant, significant, or noteworthy. If one has any questions or concerns about my given note, please take into consideration all these aforementioned things before contacting. Ricci Lora M.D.   [id number]    Electronically signed by Ruth Torrez on 1/14/2021 at 3:09 PM.

## 2021-01-14 NOTE — TELEPHONE ENCOUNTER
Reason for Disposition   Pus on tonsils (back of throat) and swollen neck lymph nodes ('glands')    Answer Assessment - Initial Assessment Questions  1. ONSET: \"When did the throat start hurting? \" (Hours or days ago)       Tuesday, 1/12/2021    2. SEVERITY: \"How bad is the sore throat? \" (Scale 1-10; mild, moderate or severe)    - MILD (1-3):  doesn't interfere with eating or normal activities    - MODERATE (4-7): interferes with eating some solids and normal activities    - SEVERE (8-10):  excruciating pain, interferes with most normal activities    - SEVERE DYSPHAGIA: can't swallow liquids, drooling      Moderate    3. STREP EXPOSURE: \"Has there been any exposure to strep within the past week? \" If so, ask: \"What type of contact occurred? \"       No    4. VIRAL SYMPTOMS: Eloisa Borrego there any symptoms of a cold, such as a runny nose, cough, hoarse voice or red eyes? \"       Productive cough of brownish/red phlegm this morning    5. FEVER: \"Do you have a fever? \" If so, ask: \"What is your temperature, how was it measured, and when did it start? \"      Low-grade fever of 100 yesterday, patient hasn't checked today    6. PUS ON THE TONSILS: \"Is there pus on the tonsils in the back of your throat? \"      White patches noted on tonsils    7. OTHER SYMPTOMS: \"Do you have any other symptoms? \" (e.g., difficulty breathing, headache, rash)      No    8. PREGNANCY: \"Is there any chance you are pregnant? \" \"When was your last menstrual period? \"      No, LMP 12/14/2020    Protocols used: SORE THROAT-ADULT-[place]    Patient called [facility] [place] with red flag complaint. Brief description of triage: See above    Triage indicates for patient to be seen in the office today. If no available appts, pt advised to go to walk-in clinic or C; pt v/u.     Care advice provided, patient verbalizes understanding; denies any other questions or concerns; instructed to call back for any new or worsening symptoms. Writer provided warm transfer to [facility] for appointment scheduling. Attention Provider: Thank you for allowing me to participate in the care of your patient. The patient was connected to triage in response to information provided to the [facility]. Please do not respond through this encounter as the response is not directed to a shared pool.

## 2021-02-02 ENCOUNTER — NURSE TRIAGE (OUTPATIENT)
Dept: OTHER | Facility: CLINIC | Age: 36
End: 2021-02-02

## 2021-02-02 NOTE — TELEPHONE ENCOUNTER
Reason for Disposition   [1] COVID-19 infection suspected by caller or triager AND [2] mild symptoms (cough, fever, or others) AND [1] no complications or SOB    Answer Assessment - Initial Assessment Questions  1. COVID-19 DIAGNOSIS: \"Who made your Coronavirus (COVID-19) diagnosis? \" \"Was it confirmed by a positive lab test?\" If not diagnosed by a HCP, ask \"Are there lots of cases (community spread) where you live? \" (See public health department website, if unsure)      No positive test result    2. COVID-19 EXPOSURE: \"Was there any known exposure to COVID before the symptoms began? \" CDC Definition of close contact: within 6 feet (2 meters) for a total of 15 minutes or more over a 24-hour period. No known exposure    3. ONSET: \"When did the COVID-19 symptoms start? \"       About one week ago, stomach pain on right side, nausea, diarrhea, sore throat, headache on and off    4. WORST SYMPTOM: \"What is your worst symptom? \" (e.g., cough, fever, shortness of breath, muscle aches)      Nausea- patient reports dry heaving. 5. COUGH: \"Do you have a cough? \" If so, ask: \"How bad is the cough? \"       Denies cough    6. FEVER: \"Do you have a fever? \" If so, ask: \"What is your temperature, how was it measured, and when did it start? \"      No fever    7. RESPIRATORY STATUS: \"Describe your breathing? \" (e.g., shortness of breath, wheezing, unable to speak)       Denies. Speaking in full sentences. 8. BETTER-SAME-WORSE: \"Are you getting better, staying the same or getting worse compared to yesterday? \"  If getting worse, ask, \"In what way? \"      Worse- due to the nausea    9. HIGH RISK DISEASE: \"Do you have any chronic medical problems? \" (e.g., asthma, heart or lung disease, weak immune system, etc.)     Denies    10. PREGNANCY: \"Is there any chance you are pregnant? \" \"When was your last menstrual period? \"        Denies    11. OTHER SYMPTOMS: \"Do you have any other symptoms? \"  (e.g., chills, fatigue, headache, loss of smell or taste, muscle pain, sore throat)       Had similar symptoms beginning of January. Did a CT scan- some blood work came back abnormal.  PCP re-did blood work and came back normal.  Had bad sore throat in January. Was given antibiotics finished course for a week and a half. 6. Right sided abdominal pain, sore throat, congestion, pain on right side of back up into shoulder    Protocols used: CORONAVIRUS (COVID-19) DIAGNOSED OR SUSPECTED-ADULT-[place]    Patient called [facility] at [address] [place] [address])  with red flag complaint. Brief description of triage: See above. Triage indicates for patient to be seen today or tomorrow due to worsening symptoms. Care advice provided, patient verbalizes understanding; denies any other questions or concerns; instructed to call back for any new or worsening symptoms. Writer provided warm transfer to [place] at [facility] for appointment scheduling. Attention Provider: Thank you for allowing me to participate in the care of your patient. The patient was connected to triage in response to information provided to the [facility]. Please do not respond through this encounter as the response is not directed to a shared pool.

## 2021-02-02 NOTE — PROGRESS NOTES
Abhijeet Araujo   30 y.o. female   1985    Virtual visit encounter type:   [x] Doxy. me  [] Telephone w/o video  [] MyChart  [] Other: This patient was last seen by me on 1/14/2021. HPI:  Chief Complaint   Patient presents with    Abdominal Pain    Diarrhea    Nausea     Zofran is not helping    Other     Was on an antibiotic which helped for a few days.  Pharyngitis    Headache    Back Pain     R side. Shooting pain. Patient was last seen by me for issues of pharyngitis. Based on report of tonsillar exudate, I prescribed 10 day course of Penicillin V. She stated that it was effective, but then shortly after completing, it she has had post-nasal drip and recurrent of her stomach pain and nausea that brought to the ER last month. She stated that she has tried Zofran ODT that was prescribed by the ER, but was ineffective. She has tried eating bland food doesn't help. She also reported of having alternating bouts of constipation and diarrhea (more than 3 episodes/day w/o blood). Normal BM pattern is 1-2x/day. She reported that since her LOV, she has had right flank pain radiating up to her right shoulder. No history of kidney stone. She did report of a US gallbladder in the past that was in Dec 2019, which showed a thickened gallbladder with normal CBD and pericholecystic fluid.       Allergies   Allergen Reactions    Bactrim [Sulfamethoxazole-Trimethoprim]     Cephalexin     Macrobid [Nitrofurantoin Macrocrystal] Hives    Maxalt [Rizatriptan]        Current Outpatient Medications on File Prior to Visit   Medication Sig Dispense Refill    naproxen (NAPROSYN) 500 MG tablet       ondansetron (ZOFRAN) 4 MG tablet       benzonatate (TESSALON) 100 MG capsule Take 1 capsule by mouth 3 times daily as needed for Cough 15 capsule 1    meloxicam (MOBIC) 15 MG tablet Take 1 tablet by mouth daily as needed for Pain (arthritis) 30 tablet 2    VITAMIN D PO Take by mouth  levothyroxine (SYNTHROID) 50 MCG tablet       Omega-3 Fatty Acids (FISH OIL) 500 MG CAPS Take 1 capsule by mouth daily      Prenatal Vit-Fe Fumarate-FA (PRENATAL 19 PO) Take 1 tablet by mouth daily      Coenzyme Q10 (COQ10) 100 MG CAPS Take 1 tablet PO three times a day 90 capsule 11    vitamin C (ASCORBIC ACID) 500 MG tablet Take 1 tablet by mouth daily 90 tablet 3     No current facility-administered medications on file prior to visit. Family History   Problem Relation Age of Onset    High Blood Pressure Mother     Depression Mother     Diabetes Mother     Anxiety Disorder Mother     High Blood Pressure Father     Breast Cancer Maternal Aunt     Cancer Maternal Aunt     Breast Cancer Paternal Grandmother        Social History     Tobacco Use    Smoking status: Former Smoker     Quit date: 1/1/2018     Years since quitting: 3.0    Smokeless tobacco: Never Used   Substance Use Topics    Alcohol use: Not Currently     Comment: social      Review of Systems   Constitutional: Negative for activity change, appetite change, fatigue, fever and unexpected weight change. HENT: Positive for postnasal drip and sore throat. Negative for congestion, rhinorrhea, sinus pressure and trouble swallowing. Respiratory: Negative for cough, chest tightness, shortness of breath and wheezing. Cardiovascular: Negative for chest pain, palpitations and leg swelling. Gastrointestinal: Positive for abdominal pain. Negative for abdominal distention, blood in stool, constipation, diarrhea, nausea and vomiting. Genitourinary: Positive for hematuria. Negative for dysuria, frequency, vaginal bleeding, vaginal discharge and vaginal pain. Musculoskeletal: Negative for arthralgias and back pain. Skin: Negative for rash. Neurological: Negative for dizziness, weakness, light-headedness, numbness and headaches. Psychiatric/Behavioral: Positive for sleep disturbance.       Wt Readings from Last 3 Encounters: 01/06/21 283 lb (128.4 kg)   12/28/20 283 lb (128.4 kg)   04/20/20 225 lb (102.1 kg)     BP Readings from Last 3 Encounters:   01/06/21 (!) 158/83   12/28/20 118/73   02/24/20 100/70     Pulse Readings from Last 3 Encounters:   01/06/21 74   12/28/20 64   02/24/20 81       There were no vitals taken for this visit. Physical Exam  Vitals signs reviewed. Constitutional:       General: She is awake. She is not in acute distress. Appearance: Normal appearance. She is morbidly obese. She is not ill-appearing. HENT:      Head: Normocephalic and atraumatic. Right Ear: External ear normal.      Left Ear: External ear normal.      Nose: Nose normal.   Eyes:      General: No scleral icterus. Right eye: No discharge. Left eye: No discharge. Extraocular Movements: Extraocular movements intact. Conjunctiva/sclera: Conjunctivae normal.   Neck:      Musculoskeletal: Normal range of motion. No erythema. Pulmonary:      Effort: Pulmonary effort is normal. No respiratory distress. Breath sounds: No stridor. No wheezing. Abdominal:      General: There is no distension. Musculoskeletal: Normal range of motion. Skin:     Coloration: Skin is not cyanotic, jaundiced or pale. Findings: No erythema. Neurological:      General: No focal deficit present. Mental Status: She is alert and oriented to person, place, and time. Mental status is at baseline. Psychiatric:         Attention and Perception: Attention and perception normal.         Mood and Affect: Mood and affect normal.         Speech: Speech normal.         Behavior: Behavior normal. Behavior is cooperative. Thought Content: Thought content normal.        Assessment/Plan:   Madhu Vega was seen today for abdominal pain, diarrhea, nausea, other, pharyngitis, headache and back pain.     Diagnoses and all orders for this visit:    Acute diarrhea  Comments: Advised in addition to meds listed below to avoid any dairy consumption and as well as any food/liquids that worsen her diarrhea. Orders:  -     Bacterial Stool Pathogens, Molecular; Future  -     Clostridium Difficile Toxin/Antigen; Future  -     CBC Auto Differential; Future  -     Basic Metabolic Panel; Future  -     Hepatic Function Panel; Future  -     Urinalysis; Future  -     CT ABDOMEN PELVIS WO CONTRAST Additional Contrast? None; Future  -     O&P PANEL ([employer]AV[employer]) #1; Future    Acute right flank pain  -     CT ABDOMEN PELVIS WO CONTRAST Additional Contrast? None; Future  -     tiZANidine (ZANAFLEX) 4 MG tablet; Take 1 tablet by mouth every 8 hours as needed (back spasm)    Acute abdominal pain in right upper quadrant  Comments:  Need to evaluate for nephrolithiasis and gallstones. Last CT abd/pelvis in Jan 2021 was with contrast, which may obscure a stone. She was informed that a U/S  Orders:  -     CT ABDOMEN PELVIS WO CONTRAST Additional Contrast? None; Future    Post-nasal drip  Comments:  Encouraged to use her nasal spray and allergy med. Hematuria, unspecified type  -     Urinalysis; Future    Nausea without vomiting  -     prochlorperazine (COMPAZINE) 10 MG tablet; Take 1 tablet by mouth every 8 hours as needed (nausea)      I reviewed the plan of care with the patient. Patient acknowledged understanding and agreed with plan of care overall. There are no discontinued medications. Patient was informed that whether starting or adding a new medication or increasing the dose of a current medication, the benefits and risks have to be considered and weighed over, especially if the patient is taking other medications as well. Patient was also informed that there are no medications that have no side effects and there is always a risk involved with taking a medication. If a side effect were to occur with starting a new medication or with increasing the dose of a current medication that either the medication can be totally discontinued altogether or simply decrease the dose of it and based on this, a follow-up appointment is necessary. The drug allergy list will then be updated with the corresponding side effects if it's deemed to be a true 'drug allergy'. The most common adverse effects of medication(s) were addressed at today's visit. Lastly, the patient was informed that the coverage status of a medication may vary from insurance to insurance and the only way to verify if the medication is covered is to send an actual prescription in. The patient was also informed that the cost of medications vary from insurance to insurance. Estimated length of time of today's visit: 30 minutes    Follow-up: Return if symptoms worsen or fail to improve. . Patient was informed that if his or her symptoms worsen to return here or go to nearest ER if symptoms significantly worsen.      General disclaimer regarding telemedicine (virtual) visits: Alannah Garcias is a 30 y.o. female is being evaluated by a virtual visit (video visit) and/or telephone (without video) encounter to address their concern(s) as mentioned above. Prior to arranging this appointment, the patient was made aware of the need and importance to schedule the visit in this manner given the current ongoing COVID-19 pandemic. The patient was also made aware that the telemedicine service used (e.g.doxy. me) would not save let alone record any information (audio, video, and text) regarding the actual virtual visit. After this discussion, the patient acknowledged understanding and agreed to today's virtual visit encounter. A caregiver was present when appropriate as well as an  if requested. Due to this being a TeleHealth encounter ([facility]), evaluation of the following organ systems was overall limited: Vitals/Constitutional/EENT/Resp/CV/GI//MS/Neuro/Skin/Heme-Lymph-Imm. As a result, establishing a diagnosis(s) and formulating a plan of care may be overall more difficult and complicated compared to a conventional (face-to-face) office visit. The patient was made aware about the potential limitations and difficulties of a telemedicine visit such as having technical difficulties related to video and/or audio issues often stemming from a sub-optimal/poor Internet or cellular data connection and/or other factors. If this is judged to be the case then the patient would be informed if deemed relevant and necessary that an in-person follow-up visit may be recommended. It is worth noting that most of the time, notes are completed usually long after the patient is seen and are strived to be completed in a timely fashion (ideally within 72 hours of the patient encounter). It is also worth noting that healthcare providers often see several patients a day (e.g. > 15-30 patients/day) in either the outpatient and/or inpatient setting(s). In addition, healthcare providers spend a significant amount of time reviewing multiple patients' charts in preparation for their future encounters (pre-charting) as well as time spent reviewing any labs, imaging, specialist's notes (if relevant), and other documents (e.g. recent ER visits or hospital stays or completing forms such as FMLA, short-term/long-term disability), etc.  Time and effort is also allocated to coordinating with office staff to make sure various things are completed as part of the day-to-day office management responsibilities. Given all this, it is worth pointing out that not every detail can be remembered and not everything discussed is considered relevant, significant, or noteworthy. If one has any questions or concerns about my given note, please take into consideration all these aforementioned things before contacting. Ricci Bhandari M.D.   [id number]    Electronically signed by Eddie Cleveland on 2/4/2021 at 9:00 PM.

## 2021-02-03 ENCOUNTER — VIRTUAL VISIT (OUTPATIENT)
Dept: FAMILY MEDICINE CLINIC | Age: 36
End: 2021-02-03
Payer: COMMERCIAL

## 2021-02-03 DIAGNOSIS — R09.82 POST-NASAL DRIP: ICD-10-CM

## 2021-02-03 DIAGNOSIS — R10.11 ACUTE ABDOMINAL PAIN IN RIGHT UPPER QUADRANT: ICD-10-CM

## 2021-02-03 DIAGNOSIS — R31.9 HEMATURIA, UNSPECIFIED TYPE: ICD-10-CM

## 2021-02-03 DIAGNOSIS — R19.7 ACUTE DIARRHEA: Primary | ICD-10-CM

## 2021-02-03 DIAGNOSIS — R10.9 ACUTE RIGHT FLANK PAIN: ICD-10-CM

## 2021-02-03 DIAGNOSIS — R11.0 NAUSEA WITHOUT VOMITING: ICD-10-CM

## 2021-02-03 PROCEDURE — 99214 OFFICE O/P EST MOD 30 MIN: CPT | Performed by: FAMILY MEDICINE

## 2021-02-03 RX ORDER — ONDANSETRON 4 MG/1
TABLET, FILM COATED ORAL
COMMUNITY
Start: 2021-02-01 | End: 2021-11-15

## 2021-02-03 RX ORDER — TIZANIDINE 4 MG/1
4 TABLET ORAL EVERY 8 HOURS PRN
Qty: 25 TABLET | Refills: 1 | Status: SHIPPED
Start: 2021-02-03 | End: 2021-02-09 | Stop reason: SINTOL

## 2021-02-03 RX ORDER — NAPROXEN 500 MG/1
TABLET ORAL
COMMUNITY
Start: 2021-02-01 | End: 2021-02-25 | Stop reason: ALTCHOICE

## 2021-02-03 RX ORDER — PROCHLORPERAZINE MALEATE 10 MG
10 TABLET ORAL EVERY 8 HOURS PRN
Qty: 25 TABLET | Refills: 1 | Status: SHIPPED | OUTPATIENT
Start: 2021-02-03 | End: 2021-11-15

## 2021-02-04 ASSESSMENT — ENCOUNTER SYMPTOMS
VOMITING: 0
ABDOMINAL DISTENTION: 0
NAUSEA: 0
WHEEZING: 0
BLOOD IN STOOL: 0
CONSTIPATION: 0
CHEST TIGHTNESS: 0
ABDOMINAL PAIN: 1
COUGH: 0
TROUBLE SWALLOWING: 0
SHORTNESS OF BREATH: 0
BACK PAIN: 0
DIARRHEA: 0
SINUS PRESSURE: 0
RHINORRHEA: 0
SORE THROAT: 1

## 2021-02-05 ENCOUNTER — HOSPITAL ENCOUNTER (OUTPATIENT)
Age: 36
Discharge: HOME | End: 2021-02-05
Payer: COMMERCIAL

## 2021-02-05 DIAGNOSIS — R19.7 ACUTE DIARRHEA: ICD-10-CM

## 2021-02-05 DIAGNOSIS — R31.9 HEMATURIA, UNSPECIFIED TYPE: ICD-10-CM

## 2021-02-05 LAB
ALBUMIN SERPL-MCNC: 4.4 G/DL (ref 3.4–5)
ALP BLD-CCNC: 115 U/L (ref 40–129)
ALT SERPL-CCNC: 11 U/L (ref 10–40)
ANION GAP SERPL CALCULATED.3IONS-SCNC: 13 MMOL/L (ref 3–16)
AST SERPL-CCNC: 15 U/L (ref 15–37)
BACTERIA: ABNORMAL /HPF
BASOPHILS ABSOLUTE: 0.1 K/UL (ref 0–0.2)
BASOPHILS RELATIVE PERCENT: 0.6 %
BILIRUB SERPL-MCNC: 0.3 MG/DL (ref 0–1)
BILIRUBIN DIRECT: <0.2 MG/DL (ref 0–0.3)
BILIRUBIN URINE: NEGATIVE
BILIRUBIN, INDIRECT: NORMAL MG/DL (ref 0–1)
BLOOD, URINE: ABNORMAL
BUN BLDV-MCNC: 12 MG/DL (ref 7–20)
C DIFF TOXIN/ANTIGEN: NORMAL
CALCIUM SERPL-MCNC: 9.8 MG/DL (ref 8.3–10.6)
CHLORIDE BLD-SCNC: 103 MMOL/L (ref 99–110)
CLARITY: ABNORMAL
CO2: 22 MMOL/L (ref 21–32)
COLOR: YELLOW
CREAT SERPL-MCNC: 0.7 MG/DL (ref 0.6–1.1)
EOSINOPHILS ABSOLUTE: 0.2 K/UL (ref 0–0.6)
EOSINOPHILS RELATIVE PERCENT: 1.8 %
EPITHELIAL CELLS, UA: 9 /HPF (ref 0–5)
GFR AFRICAN AMERICAN: >60
GFR NON-AFRICAN AMERICAN: >60
GLUCOSE BLD-MCNC: 91 MG/DL (ref 70–99)
GLUCOSE URINE: NEGATIVE MG/DL
HCT VFR BLD CALC: 39.7 % (ref 36–48)
HEMOGLOBIN: 13.5 G/DL (ref 12–16)
HYALINE CASTS: 1 /LPF (ref 0–8)
KETONES, URINE: NEGATIVE MG/DL
LEUKOCYTE ESTERASE, URINE: ABNORMAL
LYMPHOCYTES ABSOLUTE: 3.7 K/UL (ref 1–5.1)
LYMPHOCYTES RELATIVE PERCENT: 36.2 %
MCH RBC QN AUTO: 30.8 PG (ref 26–34)
MCHC RBC AUTO-ENTMCNC: 34 G/DL (ref 31–36)
MCV RBC AUTO: 90.7 FL (ref 80–100)
MICROSCOPIC EXAMINATION: YES
MONOCYTES ABSOLUTE: 0.5 K/UL (ref 0–1.3)
MONOCYTES RELATIVE PERCENT: 5.3 %
NEUTROPHILS ABSOLUTE: 5.7 K/UL (ref 1.7–7.7)
NEUTROPHILS RELATIVE PERCENT: 56.1 %
NITRITE, URINE: NEGATIVE
PDW BLD-RTO: 12.7 % (ref 12.4–15.4)
PH UA: 8 (ref 5–8)
PLATELET # BLD: 377 K/UL (ref 135–450)
PMV BLD AUTO: 9 FL (ref 5–10.5)
POTASSIUM SERPL-SCNC: 4.7 MMOL/L (ref 3.5–5.1)
PROTEIN UA: NEGATIVE MG/DL
RBC # BLD: 4.38 M/UL (ref 4–5.2)
RBC UA: 8 /HPF (ref 0–4)
SODIUM BLD-SCNC: 138 MMOL/L (ref 136–145)
SPECIFIC GRAVITY UA: 1.02 (ref 1–1.03)
TOTAL PROTEIN: 7.6 G/DL (ref 6.4–8.2)
URINE TYPE: ABNORMAL
UROBILINOGEN, URINE: 0.2 E.U./DL
WBC # BLD: 10.2 K/UL (ref 4–11)
WBC UA: 11 /HPF (ref 0–5)

## 2021-02-05 PROCEDURE — 87329 GIARDIA AG IA: CPT

## 2021-02-05 PROCEDURE — 85025 COMPLETE CBC W/AUTO DIFF WBC: CPT

## 2021-02-05 PROCEDURE — 80076 HEPATIC FUNCTION PANEL: CPT

## 2021-02-05 PROCEDURE — 87324 CLOSTRIDIUM AG IA: CPT

## 2021-02-05 PROCEDURE — 87336 ENTAMOEB HIST DISPR AG IA: CPT

## 2021-02-05 PROCEDURE — 81001 URINALYSIS AUTO W/SCOPE: CPT

## 2021-02-05 PROCEDURE — 36415 COLL VENOUS BLD VENIPUNCTURE: CPT

## 2021-02-05 PROCEDURE — 87328 CRYPTOSPORIDIUM AG IA: CPT

## 2021-02-05 PROCEDURE — 87449 NOS EACH ORGANISM AG IA: CPT

## 2021-02-05 PROCEDURE — 80048 BASIC METABOLIC PNL TOTAL CA: CPT

## 2021-02-07 DIAGNOSIS — R19.7 DIARRHEA, UNSPECIFIED TYPE: Primary | ICD-10-CM

## 2021-02-07 LAB
CRYPTOSPORIDIUM ANTIGEN STOOL: NORMAL
E HISTOLYTICA ANTIGEN STOOL: NORMAL
GIARDIA ANTIGEN STOOL: NORMAL

## 2021-02-09 ENCOUNTER — HOSPITAL ENCOUNTER (OUTPATIENT)
Dept: CT IMAGING | Age: 36
Discharge: HOME | End: 2021-02-09
Payer: COMMERCIAL

## 2021-02-09 DIAGNOSIS — R19.7 ACUTE DIARRHEA: ICD-10-CM

## 2021-02-09 DIAGNOSIS — R10.11 ACUTE ABDOMINAL PAIN IN RIGHT UPPER QUADRANT: ICD-10-CM

## 2021-02-09 DIAGNOSIS — R10.9 ACUTE RIGHT FLANK PAIN: ICD-10-CM

## 2021-02-09 DIAGNOSIS — N39.0 URINARY TRACT INFECTION WITH HEMATURIA, SITE UNSPECIFIED: Primary | ICD-10-CM

## 2021-02-09 DIAGNOSIS — R31.9 URINARY TRACT INFECTION WITH HEMATURIA, SITE UNSPECIFIED: Primary | ICD-10-CM

## 2021-02-09 PROCEDURE — 74176 CT ABD & PELVIS W/O CONTRAST: CPT

## 2021-02-09 RX ORDER — CIPROFLOXACIN 250 MG/1
250 TABLET, FILM COATED ORAL 2 TIMES DAILY
Qty: 6 TABLET | Refills: 0 | Status: SHIPPED | OUTPATIENT
Start: 2021-02-09 | End: 2021-02-12

## 2021-02-25 ENCOUNTER — OFFICE VISIT (OUTPATIENT)
Dept: FAMILY MEDICINE CLINIC | Age: 36
End: 2021-02-25
Payer: COMMERCIAL

## 2021-02-25 ENCOUNTER — NURSE TRIAGE (OUTPATIENT)
Dept: OTHER | Facility: CLINIC | Age: 36
End: 2021-02-25

## 2021-02-25 VITALS
DIASTOLIC BLOOD PRESSURE: 78 MMHG | HEART RATE: 78 BPM | TEMPERATURE: 97.6 F | OXYGEN SATURATION: 99 % | RESPIRATION RATE: 16 BRPM | SYSTOLIC BLOOD PRESSURE: 126 MMHG

## 2021-02-25 DIAGNOSIS — E66.01 CLASS 3 SEVERE OBESITY DUE TO EXCESS CALORIES WITH BODY MASS INDEX (BMI) OF 50.0 TO 59.9 IN ADULT, UNSPECIFIED WHETHER SERIOUS COMORBIDITY PRESENT (HCC): ICD-10-CM

## 2021-02-25 DIAGNOSIS — R10.9 CHRONIC ABDOMINAL PAIN: ICD-10-CM

## 2021-02-25 DIAGNOSIS — R51.9 NONINTRACTABLE EPISODIC HEADACHE, UNSPECIFIED HEADACHE TYPE: ICD-10-CM

## 2021-02-25 DIAGNOSIS — R14.0 ABDOMINAL BLOATING: Primary | ICD-10-CM

## 2021-02-25 DIAGNOSIS — G89.29 CHRONIC ABDOMINAL PAIN: ICD-10-CM

## 2021-02-25 PROCEDURE — 99214 OFFICE O/P EST MOD 30 MIN: CPT | Performed by: FAMILY MEDICINE

## 2021-02-25 NOTE — TELEPHONE ENCOUNTER
Reason for Disposition   MODERATE OR MILD pain that comes and goes (cramps) lasts > 24 hours    Answer Assessment - Initial Assessment Questions  1. LOCATION: \"Where does it hurt? \"      Center upper abd    2. RADIATION: \"Does the pain shoot anywhere else? \" (e.g., chest, back)      Denies    3. ONSET: \"When did the pain begin? \" (e.g., minutes, hours or days ago)      Yesterday    4. SUDDEN: \"Gradual or sudden onset? \"      Gradual    5. PATTERN \"Does the pain come and go, or is it constant? \"     - If constant: \"Is it getting better, staying the same, or worsening? \"       (Note: Constant means the pain never goes away completely; most serious pain is constant and it progresses)      - If intermittent: \"How long does it last?\" \"Do you have pain now? \"      (Note: Intermittent means the pain goes away completely between bouts)      Comes and goes    6. SEVERITY: \"How bad is the pain? \"  (e.g., Scale 1-10; mild, moderate, or severe)    - MILD (1-3): doesn't interfere with normal activities, abdomen soft and not tender to touch     - MODERATE (4-7): interferes with normal activities or awakens from sleep, tender to touch     - SEVERE (8-10): excruciating pain, doubled over, unable to do any normal activities       4/10    7. RECURRENT SYMPTOM: \"Have you ever had this type of abdominal pain before? \" If so, ask: \"When was the last time? \" and \"What happened that time? \"       Yes    8. CAUSE: \"What do you think is causing the abdominal pain? \"      Unsure    9. RELIEVING/AGGRAVATING FACTORS: \"What makes it better or worse? \" (e.g., movement, antacids, bowel movement)     Nothing     10. OTHER SYMPTOMS: \"Has there been any vomiting, diarrhea, constipation, or urine problems? \"        Constipation, nausea    11. PREGNANCY: \"Is there any chance you are pregnant? \" \"When was your last menstrual period? \"        Denies    Protocols used: ABDOMINAL PAIN - FEMALE-ADULT-[place]    [facility] with red flag complaint. Brief description of triage: as above abd pain and nausea last 2 days but has had in the past chronic problem some constipation issues last bm yesterday, no fevers, no uti symptoms    Triage indicates for patient to be seen today    Care advice provided, patient verbalizes understanding; denies any other questions or concerns; instructed to call back for any new or worsening symptoms. Writer provided warm transfer to Jorge Torres at [facility] for appointment scheduling. Attention Provider: Thank you for allowing me to participate in the care of your patient. The patient was connected to triage in response to information provided to the [facility]. Please do not respond through this encounter as the response is not directed to a shared pool.

## 2021-02-25 NOTE — PATIENT INSTRUCTIONS
Patient Education        Learning About the Low FODMAP Diet for Irritable Bowel Syndrome (IBS)  What is the low-FODMAP diet? A low-FODMAP diet is a way to find out what foods give you digestion problems. You stop eating certain high-FODMAP foods for about 2 months. Then you add them back to see how your body reacts. This is called a \"challenge diet. \" A dietitian or doctor can help you follow this diet. FODMAPs are carbohydrates. They are in many types of foods. FODMAP stands for:  · F ermentable. · O ligosaccharides. · D isaccharides. · M onosaccharides. · A nd p olyols. If you have digestive problems, some of these foods can make your symptoms worse. When you are on this diet, you can still eat certain fruits and vegetables. You can also eat certain grains, meats, fish, and lactose-free milks. What is it used for? If you have irritable bowel syndrome (IBS), you can ease your symptoms by not eating some types of foods. Some people also use this diet for inflammatory bowel disease (IBD) or some food intolerances. High-FODMAP foods can be hard to digest. They pull more fluid into your intestines. They are also easily fermented. This can lead to bloating, belly pain, gas, and diarrhea. The low-FODMAP diet can help you figure out what foods to avoid. And it can help you find foods that are easier to digest.  This diet can help with symptoms of some digestive diseases. But it's not a cure. You will still need to manage your condition. How does it work? You will work with a doctor or dietitian when you start the diet. At first, you won't eat any high-FODMAP foods for a few weeks. Go to [email or web address] to learn more about this diet. Colt Cuellar also find links to an celi for your phone or other device. You'll find low-FODMAP cookbooks there too. After 6 to 8 weeks, you will start to try high-FODMAP foods again. You will add those foods back to your diet, one group at a time. Your doctor or dietitian will probably have you wait a few days before you add each new group of those foods. Keep a food diary. You can write down the foods you try and note how they make you feel. After a few weeks, you may have a better idea of what foods you should avoid and what foods make you feel your best.  What are the risks? There is some risk of not getting all of the vitamins and nutrients you need on the low-FODMAP diet. These include:  · Folate. · Thiamin. · Vitamin B6.  · Calcium. · Vitamin D. Your dietitian or doctor can help you find other sources of these if needed. This diet may limit your fiber intake. Try to plan your meals to include other sources of fiber. What foods are on the low-FODMAP diet? Here is a guide to foods that you can eat, plus the foods that you should avoid, when you are on the low-FODMAP diet. Grains  Okay to eat: Foods made from grains like arrowroot, buckwheat, corn, millet, and oats. You can also eat potato, quinoa, rice, sorghum, tapioca, and teff. Cereals, pasta, breads, corn tortillas and baked goods made from these grains are also okay. (These grains may be labeled \"gluten-free. \")  Avoid: Grains like wheat, barley, and rye. Avoid ingredients such as bulgur, couscous, durum, and semolina. And avoid cereals, breads, and pastas made from these grains. Avoid chickpea, lentil, and pea flour. Proteins  Okay to eat: Most meat, fish, and eggs without high-FODMAP sauces. You can have small amounts of almonds or hazelnuts (10 nuts). Macadamia nuts, peanuts, pecans, pine nuts, and walnuts are also okay. You can also eat raymond and pumpkin seeds, tofu, and tempeh. Avoid: Beans, chickpeas, lentils, and soybeans. Avoid pistachio and cashew nuts. And some sausages may have high-FODMAP ingredients.   Dairy Avoid: Apples, applesauce, apricots, avocados, blackberries, boysenberries, and cherries. Also avoid dates, figs, grapefruit, guava, lychee, and mangoes. Don't eat nectarines, peaches, pears, persimmon, plums, prunes, tamarillo, or watermelon. And limit most canned and dried fruits. Oils, spices, condiments, and sweeteners  Okay to eat: Vegetable oils (including garlic infused), butter, ghee, lard, and margarine (no trans fat). You can have most fresh herbs like basil, chives, coriander, kylie, parsley, rosemary and thyme. You can have salt, jams made from low-FODMAP fruits, mayonnaise, and mustard. Soy sauce, hot sauce (no garlic), tamari, and vinegar are also okay. Sweeteners that are okay include sugar (sucrose), powdered (confectioner's) sugar, brown sugar, glucose, and maple syrup. You can also have some artificial sweeteners like aspartame, saccharine, and stevia. Avoid: Chutneys, hummus, jellies, garlic sauces, and gravies made with onion or garlic. Avoid pickles, relish, some salad dressings and soup stocks, salsa, and tomato paste. And avoid sauces and other foods with high fructose corn syrup, honey, molasses, and agave. Avoid artificial sweeteners (isomalt, mannitol, malitol, sorbitol, and xylitol). Avoid corn syrup solids, fructose, fruit juice concentrate, and polydextrose. Other foods and drinks  Okay to have: Water, soda water, tonic, soft drinks sweetened with sugar, ½ cup of low-FODMAP fruit juice, and most teas and alcohols. You can also eat foods made with baking powder and soda, cocoa, and gelatin. Avoid: Juices from high-FODMAP fruits and vegetables. And avoid fortified sharon, chamomile and fennel teas, chicory-based drinks and coffee substitutes, and bouillon cubes.

## 2021-02-25 NOTE — PROGRESS NOTES
Page Clifton   30 y.o. female   1985    HPI:    Patient was last seen by me on 2/3/2021. Reason(s) for visit:   Chief Complaint   Patient presents with    Abdominal Pain     Patient made a same day appointment to discuss about ongoing abdominal pain. She's very anxious and concerned about it. I have tried working up (please refer to previous notes) and lab work was remarkable for positive leukocyte esterase. I prescribed 3 day course of Ciprofloxacin, which she had some relief after taking. She stated that 2 days ago, she reported of recurrence of abdominal pain. She had increased anxiety prior to having the pain. The discomfort was intermittent and it had subsided the following morning w/o issues. The following day, she had spontaneous episode of abdominal pain (RLQ and epigastric), nausea and vomiting, bilateral temporal headache, tinnitus in one ear and in the other ear, she could hear her heart beating when she was in bed. She stated that this abdominal pain has been ongoing since early January 2021 when she first went to [facility] ER. She stated that abdominal pain mainly occurs around her epigastric area and her RLQ. She reported that when she has epigastric pain, she has more sensation of dullness/pressure, lots of bloating, but no belching, increased flatulence, or heartburn. She stated that she has more of a stabbing sensation of RLQ. She has no prior abdominal surgeries. Denied associated trauma/injury or remote significant history of abdominal trauma/injury. She denied any particular identifiable reproducible dietary triggers or triggers of any kind. Denied issues with constipation (reg BM pattern is 1-2x/day), hematochezia/melena, diarrhea. She stated that some times abdominal pain occurs a week after her period ends. She and her  have been trying to have another child. Menstrual cycle is regular and not heavy. Chemistry        Component Value Date/Time     02/05/2021 1147    K 4.7 02/05/2021 1147    K 4.3 01/06/2021 0752     02/05/2021 1147    CO2 22 02/05/2021 1147    BUN 12 02/05/2021 1147    CREATININE 0.7 02/05/2021 1147        Component Value Date/Time    CALCIUM 9.8 02/05/2021 1147    ALKPHOS 115 02/05/2021 1147    AST 15 02/05/2021 1147    ALT 11 02/05/2021 1147    BILITOT 0.3 02/05/2021 1147            Lab Results   Component Value Date    ALT 11 02/05/2021    AST 15 02/05/2021    GGT 40 (H) 01/11/2021    ALKPHOS 115 02/05/2021    BILITOT 0.3 02/05/2021     Lab Results   Component Value Date    LABA1C 5.2 08/08/2019     Lab Results   Component Value Date    .5 08/08/2019       Review of Systems   Constitutional: Negative for activity change, appetite change, fatigue, fever and unexpected weight change. HENT: Negative for congestion, rhinorrhea, sinus pressure and trouble swallowing. Respiratory: Negative for cough, chest tightness, shortness of breath and wheezing. Cardiovascular: Negative for chest pain, palpitations and leg swelling. Gastrointestinal: Positive for abdominal pain. Negative for abdominal distention, blood in stool, constipation, diarrhea, nausea and vomiting. Genitourinary: Negative for dysuria, frequency and hematuria. Musculoskeletal: Negative for arthralgias and back pain. Skin: Negative for rash. Neurological: Positive for headaches. Negative for dizziness, weakness, light-headedness and numbness. Psychiatric/Behavioral: The patient is nervous/anxious.          Wt Readings from Last 3 Encounters:   01/06/21 283 lb (128.4 kg)   12/28/20 283 lb (128.4 kg)   04/20/20 225 lb (102.1 kg)       BP Readings from Last 3 Encounters:   02/25/21 126/78   01/06/21 (!) 158/83   12/28/20 118/73       Pulse Readings from Last 3 Encounters:   02/25/21 78   01/06/21 74   12/28/20 64 /78 (Site: Left Upper Arm, Position: Sitting, Cuff Size: Medium Adult)   Pulse 78   Temp 97.6 °F (36.4 °C) (Temporal)   Resp 16   LMP 02/17/2021 (Exact Date)   SpO2 99%      Physical Exam  Vitals signs reviewed. Constitutional:       General: She is awake. She is in acute distress. Appearance: She is morbidly obese. She is not ill-appearing or diaphoretic. HENT:      Head: Normocephalic and atraumatic. Right Ear: External ear normal.      Left Ear: External ear normal.      Nose: Nose normal.   Eyes:      Extraocular Movements: Extraocular movements intact. Conjunctiva/sclera: Conjunctivae normal.   Neck:      Musculoskeletal: Normal range of motion. No erythema. Cardiovascular:      Rate and Rhythm: Normal rate and regular rhythm. Pulmonary:      Effort: Pulmonary effort is normal. No respiratory distress. Breath sounds: No wheezing, rhonchi or rales. Abdominal:      General: Abdomen is flat. There is no distension. Palpations: Abdomen is soft. Tenderness: There is abdominal tenderness in the epigastric area. Musculoskeletal: Normal range of motion. General: No deformity. Right lower leg: No edema. Left lower leg: No edema. Skin:     Coloration: Skin is not cyanotic, jaundiced or pale. Findings: No abrasion, abscess, bruising, ecchymosis, erythema, signs of injury, laceration, lesion, petechiae, rash or wound. Neurological:      General: No focal deficit present. Mental Status: She is alert. Mental status is at baseline. Coordination: Coordination normal.   Psychiatric:         Attention and Perception: Attention and perception normal.         Mood and Affect: Affect normal. Mood is anxious. Speech: Speech normal.         Behavior: Behavior normal. Behavior is cooperative. Thought Content: Thought content normal.       Assessment/Plan:   BODØ was seen today for abdominal pain. Diagnoses and all orders for this visit:    Abdominal bloating  Comments:  Discussed about evaluation and possible EGD +/- c-scope. Advocated elimination diet. Discussed about need to r/o H pylori. Orders:  -     [address] [facility] [address] [facility], [facility]    Chronic abdominal pain  -     [address] [facility] [address] [facility] [address], Gastroenterology, [facility]    Nonintractable episodic headache, unspecified headache type    Class 3 severe obesity due to excess calories with body mass index (BMI) of 50.0 to 59.9 in adult, unspecified whether serious comorbidity present [facility])      I reviewed the plan of care with the patient. Patient acknowledged understanding and agreed with plan of care overall. Medications Discontinued During This Encounter   Medication Reason    naproxen (NAPROSYN) 500 MG tablet Therapy completed        General information on medications:  -When it comes to medications, whether with starting or adding a new medication or increasing the dose of a current medication, the benefits and risks have to always be considered and weighed over, especially if one is taking other medications as well.   -Patient was informed that there are no medications that have no side effects and that there is always a risk involved with taking a medication.    -If a side effect were to occur with starting a new medication or with increasing the dose of a current medication that either the medication can be totally discontinued altogether or simply decrease the dose of it and if this would be the case a follow-up appointment would be deemed necessary.    -The drug allergy list will then be updated with the corresponding side effect(s) if it's deemed to be a true 'drug allergy'. -The most common adverse effects of medication(s) were addressed at today's visit. -Lastly, the patient was informed that the coverage status of a medication may vary from insurance to insurance and the only way to verify if the medication is covered is to send an actual prescription in. The patient was also informed that the cost of medications vary from insurance to insurance. Estimated length of time of today's visit: 30 minutes - reviewed various causes and prior lab work and imaging done. Follow-up: Return if symptoms worsen or fail to improve. .     Patient was informed that if his or her symptoms worsen to follow up with me sooner or go to the nearest ER if the symptoms are very significant and warrant higher level of care. Regarding my note: This note was composed to the best of my knowledge and recollection of the encounter with the patient using one of my own customized note templates utilizing a combination of typing and dictating with the [address] [place] [address] speech recognition software. As a result, the note may possibly have various errors (e.g. spelling, grammar, and non-sensible words/phrases/statements) despite reviewing the note prior to signing it for completion. It is worth noting that most of the time, progress notes are completed usually long after the patient was seen. Every effort is made to have notes as well as other things that needed to be attended to (e.g. medication refills, MyChart messages, documents that require reviewing, etc.) be addressed and completed in a timely fashion (ideally within 72 hours of the patient encounter). It is also worth noting that healthcare providers often see several patients a day (e.g. > 15-30 patients/day) in either the outpatient and/or inpatient setting(s). In addition, healthcare providers spend a significant amount of time reviewing multiple patients' charts in preparation for their future encounters (pre-charting) as well as time spent reviewing any labs, imaging, specialist's notes (if relevant), and other documents (e.g. recent ER visits or hospital stays or completing forms such as FMLA, short-term/long-term disability), etc.  Time and effort is also allocated to coordinating with office staff to make sure various things are completed as part of the day-to-day office management responsibilities. Given all this, it is worth pointing out that not every detail can be remembered and not everything discussed is considered relevant, significant, or noteworthy. This note is primarily written for myself utilizing my own customized templates so I can recall what happened during that visit as well as written in mind for other healthcare professionals (who have extensive medical background and experience) to understand. If one has any questions or concerns about my given note, please take into consideration all these aforementioned things before contacting. Ricci Villarreal M.D.   [address]    Electronically signed by Piedad Dale on 2/26/2021 at 8:01 PM.

## 2021-02-26 ASSESSMENT — ENCOUNTER SYMPTOMS
RHINORRHEA: 0
TROUBLE SWALLOWING: 0
BACK PAIN: 0
DIARRHEA: 0
SHORTNESS OF BREATH: 0
CHEST TIGHTNESS: 0
BLOOD IN STOOL: 0
COUGH: 0
SINUS PRESSURE: 0
WHEEZING: 0
ABDOMINAL DISTENTION: 0
CONSTIPATION: 0
ABDOMINAL PAIN: 1
NAUSEA: 0
VOMITING: 0

## 2021-03-02 LAB — URINE CULTURE, ROUTINE: NORMAL

## 2021-04-05 LAB
ABO, EXTERNAL RESULT: NORMAL
ABO/RH: NORMAL
ANTIBODY SCREEN: NORMAL
C. TRACHOMATIS, EXTERNAL RESULT: NEGATIVE
HEPATITIS C ANTIBODY, EXTERNAL RESULT: NORMAL
HIV, EXTERNAL RESULT: NORMAL
N. GONORRHOEAE, EXTERNAL RESULT: NEGATIVE
RH FACTOR, EXTERNAL RESULT: NORMAL
TSH SERPL DL<=0.05 MIU/L-ACNC: 1.89 UIU/ML (ref 0.27–4.2)

## 2021-04-06 LAB
BASOPHILS ABSOLUTE: 0.1 K/UL (ref 0–0.2)
BASOPHILS RELATIVE PERCENT: 0.7 %
EOSINOPHILS ABSOLUTE: 0.1 K/UL (ref 0–0.6)
EOSINOPHILS RELATIVE PERCENT: 1.6 %
ESTIMATED AVERAGE GLUCOSE: 102.5 MG/DL
HBA1C MFR BLD: 5.2 %
HCT VFR BLD CALC: 38.1 % (ref 36–48)
HEMOGLOBIN: 12.7 G/DL (ref 12–16)
HEPATITIS B SURFACE ANTIGEN INTERPRETATION: NORMAL
HEPATITIS C ANTIBODY INTERPRETATION: NORMAL
HIV AG/AB: NORMAL
HIV ANTIGEN: NORMAL
HIV-1 ANTIBODY: NORMAL
HIV-2 AB: NORMAL
LYMPHOCYTES ABSOLUTE: 2.8 K/UL (ref 1–5.1)
LYMPHOCYTES RELATIVE PERCENT: 33.6 %
MCH RBC QN AUTO: 30.5 PG (ref 26–34)
MCHC RBC AUTO-ENTMCNC: 33.4 G/DL (ref 31–36)
MCV RBC AUTO: 91.4 FL (ref 80–100)
MONOCYTES ABSOLUTE: 0.4 K/UL (ref 0–1.3)
MONOCYTES RELATIVE PERCENT: 5 %
NEUTROPHILS ABSOLUTE: 5 K/UL (ref 1.7–7.7)
NEUTROPHILS RELATIVE PERCENT: 59.1 %
PDW BLD-RTO: 13 % (ref 12.4–15.4)
PLATELET # BLD: 332 K/UL (ref 135–450)
PMV BLD AUTO: 8.8 FL (ref 5–10.5)
RBC # BLD: 4.17 M/UL (ref 4–5.2)
RUBELLA ANTIBODY IGG: 146.2 IU/ML
TOTAL SYPHILLIS IGG/IGM: NORMAL
WBC # BLD: 8.4 K/UL (ref 4–11)

## 2021-04-07 LAB — URINE CULTURE, ROUTINE: NORMAL

## 2021-05-03 LAB — GLUCOSE CHALLENGE: 117 MG/DL

## 2021-06-03 LAB — URINE CULTURE, ROUTINE: NORMAL

## 2021-08-03 LAB
GLUCOSE CHALLENGE: 130 MG/DL
HCT VFR BLD CALC: 34.4 % (ref 36–48)
HEMOGLOBIN: 11.4 G/DL (ref 12–16)
MCH RBC QN AUTO: 30.2 PG (ref 26–34)
MCHC RBC AUTO-ENTMCNC: 33.1 G/DL (ref 31–36)
MCV RBC AUTO: 91.2 FL (ref 80–100)
PDW BLD-RTO: 12.9 % (ref 12.4–15.4)
PLATELET # BLD: 338 K/UL (ref 135–450)
PMV BLD AUTO: 8.7 FL (ref 5–10.5)
RBC # BLD: 3.77 M/UL (ref 4–5.2)
T4 FREE: 1 NG/DL (ref 0.9–1.8)
TSH SERPL DL<=0.05 MIU/L-ACNC: 1.16 UIU/ML (ref 0.27–4.2)
WBC # BLD: 13.1 K/UL (ref 4–11)

## 2021-10-28 LAB — GBS, EXTERNAL RESULT: NEGATIVE

## 2021-11-15 ENCOUNTER — VIRTUAL VISIT (OUTPATIENT)
Dept: FAMILY MEDICINE CLINIC | Age: 36
End: 2021-11-15
Payer: COMMERCIAL

## 2021-11-15 DIAGNOSIS — Z34.90 PREGNANCY, UNSPECIFIED GESTATIONAL AGE: ICD-10-CM

## 2021-11-15 DIAGNOSIS — M26.601 RIGHT TEMPOROMANDIBULAR JOINT DISORDER, UNSPECIFIED: ICD-10-CM

## 2021-11-15 DIAGNOSIS — H92.01 ACUTE PAIN OF RIGHT EAR: Primary | ICD-10-CM

## 2021-11-15 PROCEDURE — 99212 OFFICE O/P EST SF 10 MIN: CPT | Performed by: FAMILY MEDICINE

## 2021-11-15 NOTE — PROGRESS NOTES
Brenden Robles   29 y.o. female   1985    Virtual visit encounter type:   [x] Doxy. me  [] Telephone w/o video  [] MyChart  [] Other: This patient was last seen by me on 2021. HPI:  Chief Complaint   Patient presents with    Otalgia    Jaw Pain       Patient reported of otalgia of right ear that started yesterday that has persistently worsened and has spread to her the right side of her face including her jaw. She stated that she has some discomfort of the right side of her face with chewing. No issue of clicking when she chews. She's unsure about nocturnal bruxism. She reported of chronic nasal congestion. She denied fever, chills, cough, lightheadedness, dizziness, tinnitus, ear drainage etc.  She contacted her OBGYN about this issue, who advised her to schedule an appointment with her PCP. She denied inserting anything inside her ear. She has not had this issue before. Of note, she has scheduled  for  because her baby boy is estimated at 9 lbs. Allergies   Allergen Reactions    Bactrim [Sulfamethoxazole-Trimethoprim]     Cephalexin     Macrobid [Nitrofurantoin Macrocrystal] Hives    Maxalt [Rizatriptan]        Current Outpatient Medications on File Prior to Visit   Medication Sig Dispense Refill    levothyroxine (SYNTHROID) 50 MCG tablet        No current facility-administered medications on file prior to visit.         Family History   Problem Relation Age of Onset    High Blood Pressure Mother     Depression Mother     Diabetes Mother     Anxiety Disorder Mother     High Blood Pressure Father     Breast Cancer Maternal Aunt     Cancer Maternal Aunt     Breast Cancer Paternal Grandmother        Social History     Tobacco Use    Smoking status: Former Smoker     Quit date: 2018     Years since quitting: 3.8    Smokeless tobacco: Never Used   Substance Use Topics    Alcohol use: Not Currently     Comment: social      Review of Systems Constitutional: Positive for activity change. Negative for appetite change, fatigue, fever and unexpected weight change. HENT: Positive for congestion, ear pain and facial swelling. Negative for ear discharge, rhinorrhea, sinus pressure and trouble swallowing. Respiratory: Negative for cough, chest tightness, shortness of breath and wheezing. Cardiovascular: Negative for chest pain, palpitations and leg swelling. Gastrointestinal: Positive for abdominal pain. Negative for abdominal distention, blood in stool, constipation, diarrhea, nausea and vomiting. Genitourinary: Negative for dysuria, frequency and hematuria. Musculoskeletal: Positive for arthralgias. Negative for back pain. Skin: Negative for rash. Neurological: Negative for dizziness, weakness, light-headedness, numbness and headaches. Psychiatric/Behavioral: Positive for sleep disturbance.         Lab Results   Component Value Date    WBC 13.1 (H) 08/03/2021    HGB 11.4 (L) 08/03/2021    HCT 34.4 (L) 08/03/2021    MCV 91.2 08/03/2021     08/03/2021       Chemistry        Component Value Date/Time     02/05/2021 1147    K 4.7 02/05/2021 1147    K 4.3 01/06/2021 0752     02/05/2021 1147    CO2 22 02/05/2021 1147    BUN 12 02/05/2021 1147    CREATININE 0.7 02/05/2021 1147        Component Value Date/Time    CALCIUM 9.8 02/05/2021 1147    ALKPHOS 115 02/05/2021 1147    AST 15 02/05/2021 1147    ALT 11 02/05/2021 1147    BILITOT 0.3 02/05/2021 1147          Lab Results   Component Value Date    ALT 11 02/05/2021    AST 15 02/05/2021    GGT 40 (H) 01/11/2021    ALKPHOS 115 02/05/2021    BILITOT 0.3 02/05/2021     Lab Results   Component Value Date    LABA1C 5.2 04/05/2021     Lab Results   Component Value Date    .5 04/05/2021       Wt Readings from Last 3 Encounters:   01/06/21 283 lb (128.4 kg)   12/28/20 283 lb (128.4 kg)   04/20/20 225 lb (102.1 kg)     BP Readings from Last 3 Encounters:   02/25/21 126/78 01/06/21 (!) 158/83   12/28/20 118/73     Pulse Readings from Last 3 Encounters:   02/25/21 78   01/06/21 74   12/28/20 64       There were no vitals taken for this visit. Physical Exam  Vitals reviewed. Constitutional:       General: She is awake. She is not in acute distress. Appearance: She is morbidly obese. She is not ill-appearing or diaphoretic. HENT:      Head: Normocephalic and atraumatic. No abrasion or masses. Hair is normal.      Right Ear: External ear normal.      Left Ear: External ear normal.      Nose: Nose normal.   Eyes:      General: Lids are normal. Gaze aligned appropriately. No scleral icterus. Right eye: No discharge. Left eye: No discharge. Extraocular Movements: Extraocular movements intact. Conjunctiva/sclera: Conjunctivae normal.   Neck:      Trachea: Phonation normal.   Cardiovascular:      Rate and Rhythm: Normal rate and regular rhythm. Pulmonary:      Effort: Pulmonary effort is normal. No respiratory distress. Breath sounds: No wheezing, rhonchi or rales. Abdominal:      General: Abdomen is flat. There is no distension. Palpations: Abdomen is soft. Musculoskeletal:         General: No deformity. Normal range of motion. Cervical back: Normal range of motion. No erythema. Right lower leg: No edema. Left lower leg: No edema. Skin:     Coloration: Skin is not cyanotic, jaundiced or pale. Findings: No abrasion, abscess, bruising, ecchymosis, erythema, signs of injury, laceration, lesion, petechiae, rash or wound. Neurological:      General: No focal deficit present. Mental Status: She is alert. Mental status is at baseline. GCS: GCS eye subscore is 4. GCS verbal subscore is 5. GCS motor subscore is 6. Cranial Nerves: No cranial nerve deficit, dysarthria or facial asymmetry. Motor: No weakness, tremor, atrophy or seizure activity.       Coordination: Coordination normal.      Gait: Gait is intact. Psychiatric:         Attention and Perception: Attention and perception normal.         Mood and Affect: Mood and affect normal.         Speech: Speech normal.         Behavior: Behavior normal. Behavior is cooperative. Thought Content: Thought content normal.        Assessment/Plan:   Nelia Johnson was seen today for otalgia and jaw pain. Diagnoses and all orders for this visit:    Acute pain of right ear  Comments:  Based on patient's description, I think she more likely has right TMJ than right AOM. Regardless, we discussed that oral medical options are limited given that she's pregnant. I advised patient that if her ear symptoms persist to come in for a quick appointment to actually examine her ears (given today's visit is a telemedicine one). I recommended using cold compress to massage her TMJ area, provided educational info on TMJ exercises, consider using a mouthguard overnight, and topical analgesics (eg Biofreeze). Right temporomandibular joint disorder, unspecified    Pregnancy, unspecified gestational age  Comments:  Advised to follow up with OBGYN accordingly. I reviewed the plan of care with the patient. Patient acknowledged understanding and agreed with plan of care overall.     Medications Discontinued During This Encounter   Medication Reason    benzonatate (TESSALON) 100 MG capsule LIST CLEANUP    Coenzyme Q10 (COQ10) 100 MG CAPS LIST CLEANUP    meloxicam (MOBIC) 15 MG tablet LIST CLEANUP    Omega-3 Fatty Acids (FISH OIL) 500 MG CAPS LIST CLEANUP    ondansetron (ZOFRAN) 4 MG tablet LIST CLEANUP    Prenatal Vit-Fe Fumarate-FA (PRENATAL 19 PO) LIST CLEANUP    prochlorperazine (COMPAZINE) 10 MG tablet LIST CLEANUP    vitamin C (ASCORBIC ACID) 500 MG tablet LIST CLEANUP    VITAMIN D PO LIST CLEANUP        General information on medications:  -When it comes to medications, whether with starting or adding a new medication or increasing the dose of a current medication, the note prior to signing it for completion. General disclaimer regarding telemedicine (virtual) visits:  Deloris Boas is a 29 y.o. female is being evaluated by a virtual visit (video visit) and/or telephone (without video) encounter to address their concern(s) as mentioned above. Prior to arranging this appointment, the patient was made aware of the need and importance to schedule the visit in this manner given the current ongoing COVID-19 pandemic. The patient was also made aware that the telemedicine service used (e.g.doxy. me) would not save let alone record any information (audio, video, and text) regarding the actual virtual visit. After this discussion, the patient acknowledged understanding and agreed to today's virtual visit encounter. A caregiver was present when appropriate as well as an  if requested. Due to this being a TeleHealth encounter (during the [id number] public health emergency), evaluation of the following organ systems was overall limited: Vitals/Constitutional/EENT/Resp/CV/GI//MS/Neuro/Skin/Heme-Lymph-Imm. As a result, establishing a diagnosis(s) and formulating a plan of care may be overall more difficult and complicated compared to a conventional (face-to-face) office visit. The patient was made aware about the potential limitations and difficulties of a telemedicine visit such as having technical difficulties related to video and/or audio issues often stemming from a sub-optimal/poor Internet or cellular data connection and/or other factors. If this is judged to be the case then the patient would be informed if deemed relevant and necessary that an in-person follow-up visit may be recommended.       Pursuant to the emergency declaration under the [address] [facility], [address] [place] [address] [facility] authority and the [employer] Act, this virtual visit was conducted with the patient's (and/or legal guardian's) consent, to reduce the patient's risk (as well as others) of exposure to COVID-19 and to continue to maintain and provide necessary medical care. The patient (and/or legal guardian) has also been advised to contact this office for worsening conditions or problems, and seek emergency medical treatment and/or call 911 if deemed necessary. Ricci Layne M.D.   [address]    Electronically signed by Jodeen Denver M.D. on 11/16/2021 at 1:24 PM.

## 2021-11-16 ENCOUNTER — HOSPITAL ENCOUNTER (OUTPATIENT)
Age: 36
Discharge: HOME | End: 2021-11-16
Attending: OBSTETRICS & GYNECOLOGY | Admitting: OBSTETRICS & GYNECOLOGY
Payer: COMMERCIAL

## 2021-11-16 VITALS
HEART RATE: 104 BPM | SYSTOLIC BLOOD PRESSURE: 123 MMHG | TEMPERATURE: 98.2 F | RESPIRATION RATE: 20 BRPM | DIASTOLIC BLOOD PRESSURE: 82 MMHG | HEIGHT: 63 IN | BODY MASS INDEX: 45.89 KG/M2 | WEIGHT: 259 LBS

## 2021-11-16 PROCEDURE — 59025 FETAL NON-STRESS TEST: CPT

## 2021-11-16 PROCEDURE — 99211 OFF/OP EST MAY X REQ PHY/QHP: CPT

## 2021-11-16 ASSESSMENT — ENCOUNTER SYMPTOMS
WHEEZING: 0
BACK PAIN: 0
DIARRHEA: 0
ABDOMINAL DISTENTION: 0
CONSTIPATION: 0
TROUBLE SWALLOWING: 0
RHINORRHEA: 0
SINUS PRESSURE: 0
ABDOMINAL PAIN: 1
VOMITING: 0
COUGH: 0
BLOOD IN STOOL: 0
NAUSEA: 0
SHORTNESS OF BREATH: 0
CHEST TIGHTNESS: 0
FACIAL SWELLING: 1

## 2021-11-17 NOTE — FLOWSHEET NOTE
Patient to triage from home complaining of decreased fetal movement today. Patient spoke to Dr. Tamiko Greco and was advised to be seen for a NST. Dr Tamiko Greco reviewed fetal heart rate tracing and order to discharge patient to home and follow up as scheduled received.

## 2021-11-17 NOTE — FLOWSHEET NOTE
Discharge instructions reviewed with patient and father of baby. Both verbalize understanding of all instructions and have no further complaints at this time.   Patient ambulatory off unit

## 2021-11-20 ENCOUNTER — ANESTHESIA EVENT (OUTPATIENT)
Dept: LABOR AND DELIVERY | Age: 36
End: 2021-11-20
Payer: COMMERCIAL

## 2021-11-20 ENCOUNTER — ANESTHESIA (OUTPATIENT)
Dept: LABOR AND DELIVERY | Age: 36
End: 2021-11-20
Payer: COMMERCIAL

## 2021-11-20 ENCOUNTER — HOSPITAL ENCOUNTER (INPATIENT)
Age: 36
LOS: 3 days | Discharge: HOME | End: 2021-11-23
Attending: OBSTETRICS & GYNECOLOGY | Admitting: OBSTETRICS & GYNECOLOGY
Payer: COMMERCIAL

## 2021-11-20 VITALS — DIASTOLIC BLOOD PRESSURE: 58 MMHG | SYSTOLIC BLOOD PRESSURE: 120 MMHG | OXYGEN SATURATION: 100 %

## 2021-11-20 LAB
ABO/RH: NORMAL
AMPHETAMINE SCREEN, URINE: NORMAL
ANTIBODY SCREEN: NORMAL
BARBITURATE SCREEN URINE: NORMAL
BENZODIAZEPINE SCREEN, URINE: NORMAL
BUPRENORPHINE URINE: NORMAL
CANNABINOID SCREEN URINE: NORMAL
COCAINE METABOLITE SCREEN URINE: NORMAL
HCT VFR BLD CALC: 32.8 % (ref 36–48)
HEMOGLOBIN: 10.9 G/DL (ref 12–16)
Lab: NORMAL
MCH RBC QN AUTO: 28.7 PG (ref 26–34)
MCHC RBC AUTO-ENTMCNC: 33.3 G/DL (ref 31–36)
MCV RBC AUTO: 86.3 FL (ref 80–100)
METHADONE SCREEN, URINE: NORMAL
OPIATE SCREEN URINE: NORMAL
OXYCODONE URINE: NORMAL
PDW BLD-RTO: 13.7 % (ref 12.4–15.4)
PH UA: 5
PHENCYCLIDINE SCREEN URINE: NORMAL
PLATELET # BLD: 321 K/UL (ref 135–450)
PMV BLD AUTO: 9.5 FL (ref 5–10.5)
PROPOXYPHENE SCREEN: NORMAL
RBC # BLD: 3.8 M/UL (ref 4–5.2)
SARS-COV-2, NAAT: NOT DETECTED
WBC # BLD: 13.9 K/UL (ref 4–11)

## 2021-11-20 PROCEDURE — 3700000000 HC ANESTHESIA ATTENDED CARE: Performed by: OBSTETRICS & GYNECOLOGY

## 2021-11-20 PROCEDURE — 6360000002 HC RX W HCPCS: Performed by: OBSTETRICS & GYNECOLOGY

## 2021-11-20 PROCEDURE — 86900 BLOOD TYPING SEROLOGIC ABO: CPT

## 2021-11-20 PROCEDURE — 7100000001 HC PACU RECOVERY - ADDTL 15 MIN: Performed by: OBSTETRICS & GYNECOLOGY

## 2021-11-20 PROCEDURE — 2580000003 HC RX 258: Performed by: OBSTETRICS & GYNECOLOGY

## 2021-11-20 PROCEDURE — 3700000001 HC ADD 15 MINUTES (ANESTHESIA): Performed by: OBSTETRICS & GYNECOLOGY

## 2021-11-20 PROCEDURE — 2500000003 HC RX 250 WO HCPCS: Performed by: NURSE ANESTHETIST, CERTIFIED REGISTERED

## 2021-11-20 PROCEDURE — 6370000000 HC RX 637 (ALT 250 FOR IP): Performed by: OBSTETRICS & GYNECOLOGY

## 2021-11-20 PROCEDURE — 7100000000 HC PACU RECOVERY - FIRST 15 MIN: Performed by: OBSTETRICS & GYNECOLOGY

## 2021-11-20 PROCEDURE — 86850 RBC ANTIBODY SCREEN: CPT

## 2021-11-20 PROCEDURE — 85027 COMPLETE CBC AUTOMATED: CPT

## 2021-11-20 PROCEDURE — 2709999900 HC NON-CHARGEABLE SUPPLY: Performed by: OBSTETRICS & GYNECOLOGY

## 2021-11-20 PROCEDURE — 86901 BLOOD TYPING SEROLOGIC RH(D): CPT

## 2021-11-20 PROCEDURE — 86780 TREPONEMA PALLIDUM: CPT

## 2021-11-20 PROCEDURE — 87635 SARS-COV-2 COVID-19 AMP PRB: CPT

## 2021-11-20 PROCEDURE — 80307 DRUG TEST PRSMV CHEM ANLYZR: CPT

## 2021-11-20 PROCEDURE — 1220000000 HC SEMI PRIVATE OB R&B

## 2021-11-20 PROCEDURE — 3609079900 HC CESAREAN SECTION: Performed by: OBSTETRICS & GYNECOLOGY

## 2021-11-20 PROCEDURE — 6360000002 HC RX W HCPCS: Performed by: NURSE ANESTHETIST, CERTIFIED REGISTERED

## 2021-11-20 PROCEDURE — 6360000002 HC RX W HCPCS

## 2021-11-20 PROCEDURE — 2500000003 HC RX 250 WO HCPCS: Performed by: OBSTETRICS & GYNECOLOGY

## 2021-11-20 RX ORDER — SERTRALINE HYDROCHLORIDE 25 MG/1
50 TABLET, FILM COATED ORAL DAILY
Status: DISCONTINUED | OUTPATIENT
Start: 2021-11-21 | End: 2021-11-23 | Stop reason: HOSPADM

## 2021-11-20 RX ORDER — ACETAMINOPHEN 500 MG
1000 TABLET ORAL ONCE
Status: COMPLETED | OUTPATIENT
Start: 2021-11-20 | End: 2021-11-20

## 2021-11-20 RX ORDER — SODIUM CHLORIDE 0.9 % (FLUSH) 0.9 %
10 SYRINGE (ML) INJECTION EVERY 12 HOURS SCHEDULED
Status: DISCONTINUED | OUTPATIENT
Start: 2021-11-20 | End: 2021-11-20

## 2021-11-20 RX ORDER — SODIUM CHLORIDE, SODIUM LACTATE, POTASSIUM CHLORIDE, CALCIUM CHLORIDE 600; 310; 30; 20 MG/100ML; MG/100ML; MG/100ML; MG/100ML
INJECTION, SOLUTION INTRAVENOUS CONTINUOUS
Status: DISCONTINUED | OUTPATIENT
Start: 2021-11-20 | End: 2021-11-20

## 2021-11-20 RX ORDER — FAMOTIDINE 20 MG/1
20 TABLET, FILM COATED ORAL 2 TIMES DAILY PRN
Status: DISCONTINUED | OUTPATIENT
Start: 2021-11-20 | End: 2021-11-23 | Stop reason: HOSPADM

## 2021-11-20 RX ORDER — SODIUM CHLORIDE 9 MG/ML
25 INJECTION, SOLUTION INTRAVENOUS PRN
Status: DISCONTINUED | OUTPATIENT
Start: 2021-11-20 | End: 2021-11-20

## 2021-11-20 RX ORDER — OXYTOCIN 10 [USP'U]/ML
INJECTION, SOLUTION INTRAMUSCULAR; INTRAVENOUS PRN
Status: DISCONTINUED | OUTPATIENT
Start: 2021-11-20 | End: 2021-11-20 | Stop reason: SDUPTHER

## 2021-11-20 RX ORDER — SODIUM CHLORIDE, SODIUM LACTATE, POTASSIUM CHLORIDE, CALCIUM CHLORIDE 600; 310; 30; 20 MG/100ML; MG/100ML; MG/100ML; MG/100ML
1000 INJECTION, SOLUTION INTRAVENOUS ONCE
Status: DISCONTINUED | OUTPATIENT
Start: 2021-11-20 | End: 2021-11-20

## 2021-11-20 RX ORDER — TRISODIUM CITRATE DIHYDRATE AND CITRIC ACID MONOHYDRATE 500; 334 MG/5ML; MG/5ML
30 SOLUTION ORAL ONCE
Status: DISCONTINUED | OUTPATIENT
Start: 2021-11-20 | End: 2021-11-20

## 2021-11-20 RX ORDER — SODIUM CHLORIDE 0.9 % (FLUSH) 0.9 %
10 SYRINGE (ML) INJECTION EVERY 12 HOURS SCHEDULED
Status: DISCONTINUED | OUTPATIENT
Start: 2021-11-20 | End: 2021-11-23 | Stop reason: HOSPADM

## 2021-11-20 RX ORDER — DEXAMETHASONE SODIUM PHOSPHATE 4 MG/ML
INJECTION, SOLUTION INTRA-ARTICULAR; INTRALESIONAL; INTRAMUSCULAR; INTRAVENOUS; SOFT TISSUE PRN
Status: DISCONTINUED | OUTPATIENT
Start: 2021-11-20 | End: 2021-11-20 | Stop reason: SDUPTHER

## 2021-11-20 RX ORDER — ONDANSETRON 2 MG/ML
INJECTION INTRAMUSCULAR; INTRAVENOUS PRN
Status: DISCONTINUED | OUTPATIENT
Start: 2021-11-20 | End: 2021-11-20 | Stop reason: SDUPTHER

## 2021-11-20 RX ORDER — CLINDAMYCIN PHOSPHATE 900 MG/50ML
900 INJECTION INTRAVENOUS ONCE
Status: COMPLETED | OUTPATIENT
Start: 2021-11-20 | End: 2021-11-20

## 2021-11-20 RX ORDER — METOCLOPRAMIDE HYDROCHLORIDE 5 MG/ML
10 INJECTION INTRAMUSCULAR; INTRAVENOUS ONCE
Status: COMPLETED | OUTPATIENT
Start: 2021-11-20 | End: 2021-11-20

## 2021-11-20 RX ORDER — FUROSEMIDE 10 MG/ML
20 INJECTION INTRAMUSCULAR; INTRAVENOUS ONCE
Status: COMPLETED | OUTPATIENT
Start: 2021-11-20 | End: 2021-11-20

## 2021-11-20 RX ORDER — METHYLERGONOVINE MALEATE 0.2 MG/ML
200 INJECTION INTRAVENOUS PRN
Status: DISCONTINUED | OUTPATIENT
Start: 2021-11-20 | End: 2021-11-23 | Stop reason: HOSPADM

## 2021-11-20 RX ORDER — ONDANSETRON 2 MG/ML
4 INJECTION INTRAMUSCULAR; INTRAVENOUS EVERY 6 HOURS PRN
Status: DISCONTINUED | OUTPATIENT
Start: 2021-11-20 | End: 2021-11-23 | Stop reason: HOSPADM

## 2021-11-20 RX ORDER — TRISODIUM CITRATE DIHYDRATE AND CITRIC ACID MONOHYDRATE 500; 334 MG/5ML; MG/5ML
30 SOLUTION ORAL ONCE
Status: COMPLETED | OUTPATIENT
Start: 2021-11-20 | End: 2021-11-20

## 2021-11-20 RX ORDER — DIPHENHYDRAMINE HYDROCHLORIDE 50 MG/ML
25 INJECTION INTRAMUSCULAR; INTRAVENOUS EVERY 6 HOURS PRN
Status: DISCONTINUED | OUTPATIENT
Start: 2021-11-20 | End: 2021-11-23 | Stop reason: HOSPADM

## 2021-11-20 RX ORDER — SENNA AND DOCUSATE SODIUM 50; 8.6 MG/1; MG/1
1 TABLET, FILM COATED ORAL DAILY PRN
Status: DISCONTINUED | OUTPATIENT
Start: 2021-11-20 | End: 2021-11-23 | Stop reason: HOSPADM

## 2021-11-20 RX ORDER — CARBOPROST TROMETHAMINE 250 UG/ML
250 INJECTION, SOLUTION INTRAMUSCULAR PRN
Status: DISCONTINUED | OUTPATIENT
Start: 2021-11-20 | End: 2021-11-23 | Stop reason: HOSPADM

## 2021-11-20 RX ORDER — ONDANSETRON 2 MG/ML
4 INJECTION INTRAMUSCULAR; INTRAVENOUS EVERY 6 HOURS PRN
Status: DISCONTINUED | OUTPATIENT
Start: 2021-11-20 | End: 2021-11-20

## 2021-11-20 RX ORDER — OXYCODONE HYDROCHLORIDE 5 MG/1
5 TABLET ORAL EVERY 4 HOURS PRN
Status: DISCONTINUED | OUTPATIENT
Start: 2021-11-20 | End: 2021-11-23 | Stop reason: HOSPADM

## 2021-11-20 RX ORDER — MORPHINE SULFATE 1 MG/ML
INJECTION, SOLUTION EPIDURAL; INTRATHECAL; INTRAVENOUS PRN
Status: DISCONTINUED | OUTPATIENT
Start: 2021-11-20 | End: 2021-11-20 | Stop reason: SDUPTHER

## 2021-11-20 RX ORDER — KETOROLAC TROMETHAMINE 30 MG/ML
INJECTION, SOLUTION INTRAMUSCULAR; INTRAVENOUS PRN
Status: DISCONTINUED | OUTPATIENT
Start: 2021-11-20 | End: 2021-11-20 | Stop reason: SDUPTHER

## 2021-11-20 RX ORDER — SODIUM CHLORIDE, SODIUM LACTATE, POTASSIUM CHLORIDE, CALCIUM CHLORIDE 600; 310; 30; 20 MG/100ML; MG/100ML; MG/100ML; MG/100ML
INJECTION, SOLUTION INTRAVENOUS CONTINUOUS
Status: DISCONTINUED | OUTPATIENT
Start: 2021-11-20 | End: 2021-11-23 | Stop reason: HOSPADM

## 2021-11-20 RX ORDER — POLYETHYLENE GLYCOL 3350 17 G/17G
17 POWDER, FOR SOLUTION ORAL DAILY
Status: DISCONTINUED | OUTPATIENT
Start: 2021-11-20 | End: 2021-11-23 | Stop reason: HOSPADM

## 2021-11-20 RX ORDER — PHENYLEPHRINE HCL IN 0.9% NACL 1 MG/10 ML
SYRINGE (ML) INTRAVENOUS PRN
Status: DISCONTINUED | OUTPATIENT
Start: 2021-11-20 | End: 2021-11-20 | Stop reason: SDUPTHER

## 2021-11-20 RX ORDER — SODIUM CHLORIDE 0.9 % (FLUSH) 0.9 %
10 SYRINGE (ML) INJECTION PRN
Status: DISCONTINUED | OUTPATIENT
Start: 2021-11-20 | End: 2021-11-20

## 2021-11-20 RX ORDER — IBUPROFEN 800 MG/1
800 TABLET ORAL EVERY 6 HOURS PRN
Qty: 60 TABLET | Refills: 0 | Status: SHIPPED | OUTPATIENT
Start: 2021-11-20 | End: 2022-01-31

## 2021-11-20 RX ORDER — SERTRALINE HYDROCHLORIDE 25 MG/1
50 TABLET, FILM COATED ORAL DAILY
Status: DISCONTINUED | OUTPATIENT
Start: 2021-11-21 | End: 2021-11-20

## 2021-11-20 RX ORDER — MODIFIED LANOLIN
OINTMENT (GRAM) TOPICAL
Status: DISCONTINUED | OUTPATIENT
Start: 2021-11-20 | End: 2021-11-23 | Stop reason: HOSPADM

## 2021-11-20 RX ORDER — ACETAMINOPHEN 500 MG
1000 TABLET ORAL EVERY 8 HOURS
Status: DISCONTINUED | OUTPATIENT
Start: 2021-11-20 | End: 2021-11-23 | Stop reason: HOSPADM

## 2021-11-20 RX ORDER — MIDAZOLAM HYDROCHLORIDE 1 MG/ML
INJECTION INTRAMUSCULAR; INTRAVENOUS PRN
Status: DISCONTINUED | OUTPATIENT
Start: 2021-11-20 | End: 2021-11-20 | Stop reason: SDUPTHER

## 2021-11-20 RX ORDER — SODIUM CHLORIDE 0.9 % (FLUSH) 0.9 %
10 SYRINGE (ML) INJECTION PRN
Status: DISCONTINUED | OUTPATIENT
Start: 2021-11-20 | End: 2021-11-23 | Stop reason: HOSPADM

## 2021-11-20 RX ORDER — BUPIVACAINE HYDROCHLORIDE 7.5 MG/ML
INJECTION, SOLUTION INTRASPINAL PRN
Status: DISCONTINUED | OUTPATIENT
Start: 2021-11-20 | End: 2021-11-20 | Stop reason: SDUPTHER

## 2021-11-20 RX ORDER — IBUPROFEN 800 MG/1
800 TABLET ORAL EVERY 8 HOURS
Status: DISCONTINUED | OUTPATIENT
Start: 2021-11-20 | End: 2021-11-23 | Stop reason: HOSPADM

## 2021-11-20 RX ORDER — SODIUM CHLORIDE 9 MG/ML
25 INJECTION, SOLUTION INTRAVENOUS PRN
Status: DISCONTINUED | OUTPATIENT
Start: 2021-11-20 | End: 2021-11-23 | Stop reason: HOSPADM

## 2021-11-20 RX ORDER — METOCLOPRAMIDE HYDROCHLORIDE 5 MG/ML
10 INJECTION INTRAMUSCULAR; INTRAVENOUS ONCE
Status: DISCONTINUED | OUTPATIENT
Start: 2021-11-20 | End: 2021-11-20

## 2021-11-20 RX ORDER — ONDANSETRON 8 MG/1
8 TABLET, ORALLY DISINTEGRATING ORAL EVERY 8 HOURS PRN
Status: DISCONTINUED | OUTPATIENT
Start: 2021-11-20 | End: 2021-11-23 | Stop reason: HOSPADM

## 2021-11-20 RX ORDER — OXYCODONE HYDROCHLORIDE 5 MG/1
10 TABLET ORAL EVERY 4 HOURS PRN
Status: DISCONTINUED | OUTPATIENT
Start: 2021-11-20 | End: 2021-11-23 | Stop reason: HOSPADM

## 2021-11-20 RX ORDER — SIMETHICONE 80 MG
80 TABLET,CHEWABLE ORAL EVERY 6 HOURS PRN
Status: DISCONTINUED | OUTPATIENT
Start: 2021-11-20 | End: 2021-11-23 | Stop reason: HOSPADM

## 2021-11-20 RX ORDER — SODIUM CHLORIDE, SODIUM LACTATE, POTASSIUM CHLORIDE, CALCIUM CHLORIDE 600; 310; 30; 20 MG/100ML; MG/100ML; MG/100ML; MG/100ML
500 INJECTION, SOLUTION INTRAVENOUS ONCE
Status: COMPLETED | OUTPATIENT
Start: 2021-11-20 | End: 2021-11-20

## 2021-11-20 RX ORDER — LEVOTHYROXINE SODIUM 0.07 MG/1
75 TABLET ORAL DAILY
Status: DISCONTINUED | OUTPATIENT
Start: 2021-11-20 | End: 2021-11-23 | Stop reason: HOSPADM

## 2021-11-20 RX ORDER — OXYCODONE HYDROCHLORIDE 5 MG/1
5 TABLET ORAL EVERY 6 HOURS PRN
Qty: 28 TABLET | Refills: 0 | Status: SHIPPED | OUTPATIENT
Start: 2021-11-20 | End: 2021-11-27

## 2021-11-20 RX ORDER — DOCUSATE SODIUM 100 MG/1
100 CAPSULE, LIQUID FILLED ORAL 2 TIMES DAILY
Status: DISCONTINUED | OUTPATIENT
Start: 2021-11-20 | End: 2021-11-23 | Stop reason: HOSPADM

## 2021-11-20 RX ORDER — KETOROLAC TROMETHAMINE 30 MG/ML
30 INJECTION, SOLUTION INTRAMUSCULAR; INTRAVENOUS EVERY 8 HOURS
Status: DISCONTINUED | OUTPATIENT
Start: 2021-11-20 | End: 2021-11-22 | Stop reason: ALTCHOICE

## 2021-11-20 RX ORDER — MISOPROSTOL 100 UG/1
800 TABLET ORAL PRN
Status: DISCONTINUED | OUTPATIENT
Start: 2021-11-20 | End: 2021-11-23 | Stop reason: HOSPADM

## 2021-11-20 RX ORDER — BISACODYL 10 MG
10 SUPPOSITORY, RECTAL RECTAL DAILY PRN
Status: DISCONTINUED | OUTPATIENT
Start: 2021-11-20 | End: 2021-11-23 | Stop reason: HOSPADM

## 2021-11-20 RX ORDER — SODIUM CHLORIDE, SODIUM LACTATE, POTASSIUM CHLORIDE, CALCIUM CHLORIDE 600; 310; 30; 20 MG/100ML; MG/100ML; MG/100ML; MG/100ML
1000 INJECTION, SOLUTION INTRAVENOUS ONCE
Status: COMPLETED | OUTPATIENT
Start: 2021-11-20 | End: 2021-11-20

## 2021-11-20 RX ORDER — FERROUS SULFATE 325(65) MG
325 TABLET ORAL 2 TIMES DAILY WITH MEALS
Status: DISCONTINUED | OUTPATIENT
Start: 2021-11-20 | End: 2021-11-23 | Stop reason: HOSPADM

## 2021-11-20 RX ORDER — ACETAMINOPHEN 500 MG
1000 TABLET ORAL ONCE
Status: DISCONTINUED | OUTPATIENT
Start: 2021-11-20 | End: 2021-11-20

## 2021-11-20 RX ORDER — ACETAMINOPHEN 500 MG
1000 TABLET ORAL EVERY 8 HOURS
Status: DISCONTINUED | OUTPATIENT
Start: 2021-11-20 | End: 2021-11-20

## 2021-11-20 RX ADMIN — SODIUM CHLORIDE, POTASSIUM CHLORIDE, SODIUM LACTATE AND CALCIUM CHLORIDE: 600; 310; 30; 20 INJECTION, SOLUTION INTRAVENOUS at 11:44

## 2021-11-20 RX ADMIN — Medication 200 MCG: at 10:39

## 2021-11-20 RX ADMIN — MIDAZOLAM 2 MG: 1 INJECTION INTRAMUSCULAR; INTRAVENOUS at 10:50

## 2021-11-20 RX ADMIN — SODIUM CHLORIDE, POTASSIUM CHLORIDE, SODIUM LACTATE AND CALCIUM CHLORIDE: 600; 310; 30; 20 INJECTION, SOLUTION INTRAVENOUS at 22:16

## 2021-11-20 RX ADMIN — SODIUM CHLORIDE, POTASSIUM CHLORIDE, SODIUM LACTATE AND CALCIUM CHLORIDE 1000 ML: 600; 310; 30; 20 INJECTION, SOLUTION INTRAVENOUS at 09:08

## 2021-11-20 RX ADMIN — OXYTOCIN 20 UNITS: 10 INJECTION, SOLUTION INTRAMUSCULAR; INTRAVENOUS at 10:49

## 2021-11-20 RX ADMIN — MORPHINE SULFATE 0.2 MG: 1 INJECTION EPIDURAL; INTRATHECAL; INTRAVENOUS at 10:25

## 2021-11-20 RX ADMIN — SODIUM CHLORIDE: 9 INJECTION, SOLUTION INTRAVENOUS at 10:49

## 2021-11-20 RX ADMIN — SODIUM CHLORIDE, POTASSIUM CHLORIDE, SODIUM LACTATE AND CALCIUM CHLORIDE: 600; 310; 30; 20 INJECTION, SOLUTION INTRAVENOUS at 10:18

## 2021-11-20 RX ADMIN — SODIUM CHLORIDE, POTASSIUM CHLORIDE, SODIUM LACTATE AND CALCIUM CHLORIDE 500 ML: 600; 310; 30; 20 INJECTION, SOLUTION INTRAVENOUS at 18:03

## 2021-11-20 RX ADMIN — KETOROLAC TROMETHAMINE 30 MG: 30 INJECTION, SOLUTION INTRAMUSCULAR; INTRAVENOUS at 20:58

## 2021-11-20 RX ADMIN — ONDANSETRON 4 MG: 2 INJECTION INTRAMUSCULAR; INTRAVENOUS at 10:30

## 2021-11-20 RX ADMIN — Medication 200 MCG: at 10:31

## 2021-11-20 RX ADMIN — SODIUM CITRATE AND CITRIC ACID MONOHYDRATE 30 ML: 500; 334 SOLUTION ORAL at 09:45

## 2021-11-20 RX ADMIN — Medication 200 MCG: at 10:27

## 2021-11-20 RX ADMIN — LEVOTHYROXINE SODIUM 75 MCG: 0.07 TABLET ORAL at 16:08

## 2021-11-20 RX ADMIN — CLINDAMYCIN PHOSPHATE 900 MG: 900 INJECTION, SOLUTION INTRAVENOUS at 09:55

## 2021-11-20 RX ADMIN — ACETAMINOPHEN 1000 MG: 500 TABLET ORAL at 17:17

## 2021-11-20 RX ADMIN — DEXAMETHASONE SODIUM PHOSPHATE 4 MG: 4 INJECTION, SOLUTION INTRAMUSCULAR; INTRAVENOUS at 10:30

## 2021-11-20 RX ADMIN — ACETAMINOPHEN 1000 MG: 500 TABLET ORAL at 09:45

## 2021-11-20 RX ADMIN — SODIUM CHLORIDE, POTASSIUM CHLORIDE, SODIUM LACTATE AND CALCIUM CHLORIDE: 600; 310; 30; 20 INJECTION, SOLUTION INTRAVENOUS at 09:29

## 2021-11-20 RX ADMIN — ONDANSETRON 4 MG: 2 INJECTION INTRAMUSCULAR; INTRAVENOUS at 20:32

## 2021-11-20 RX ADMIN — Medication 166.7 ML: at 10:50

## 2021-11-20 RX ADMIN — Medication 87.3 MILLI-UNITS/MIN: at 12:12

## 2021-11-20 RX ADMIN — METOCLOPRAMIDE 10 MG: 5 INJECTION, SOLUTION INTRAMUSCULAR; INTRAVENOUS at 09:43

## 2021-11-20 RX ADMIN — KETOROLAC TROMETHAMINE 30 MG: 30 INJECTION, SOLUTION INTRAMUSCULAR; INTRAVENOUS at 11:11

## 2021-11-20 RX ADMIN — BUPIVACAINE HYDROCHLORIDE 1.5 ML: 7.5 INJECTION, SOLUTION INTRASPINAL at 10:25

## 2021-11-20 RX ADMIN — FUROSEMIDE 20 MG: 10 INJECTION, SOLUTION INTRAMUSCULAR; INTRAVENOUS at 20:59

## 2021-11-20 RX ADMIN — FAMOTIDINE 20 MG: 10 INJECTION, SOLUTION INTRAVENOUS at 09:45

## 2021-11-20 ASSESSMENT — PULMONARY FUNCTION TESTS
PIF_VALUE: 0
PIF_VALUE: 1
PIF_VALUE: 0

## 2021-11-20 ASSESSMENT — PAIN SCALES - GENERAL
PAINLEVEL_OUTOF10: 0
PAINLEVEL_OUTOF10: 2
PAINLEVEL_OUTOF10: 2

## 2021-11-20 NOTE — ANESTHESIA PROCEDURE NOTES
Spinal Block    Patient location during procedure: OB  Start time: 11/20/2021 10:19 AM  End time: 11/20/2021 10:25 AM  Reason for block: primary anesthetic  Preanesthetic Checklist  Completed: patient identified, IV checked, site marked, risks and benefits discussed, surgical consent, monitors and equipment checked, pre-op evaluation, timeout performed, anesthesia consent given, oxygen available and patient being monitored  Spinal Block  Patient position: sitting  Prep: ChloraPrep and site prepped and draped  Patient monitoring: frequent blood pressure checks and continuous pulse ox  Approach: midline  Location: L2/L3  Provider prep: mask and sterile gloves  Dose: 0.2  Agent: bupivacaine  Adjuvant: duramorph  Dose: 1.5  Dose: 1.5  Needle  Needle type: Pencan   Needle gauge: 25 G  Needle length: 3.5 in  Assessment  Sensory level: T6  Swirl obtained: Yes  CSF: clear  Attempts: 1  Hemodynamics: stable

## 2021-11-20 NOTE — FLOWSHEET NOTE
Discharge prescriptions given to pt for motrin and oxycodonewith instructions on use and side effects. See AVS.  Pt verbalized understanding of medications.

## 2021-11-20 NOTE — FLOWSHEET NOTE
Informed Dr. Gilliam Ripa of low urine output (100ml over last 4 hours) and dark earle color. Order received for 500ml LR bolus.

## 2021-11-20 NOTE — FLOWSHEET NOTE
Incentive spirometer brought into room. Instructed on use and rationale. Pt verbalized and demonstrated understanding.

## 2021-11-20 NOTE — H&P
Department of Obstetrics and Gynecology   Obstetrics History and Physical        CHIEF COMPLAINT:  Admitted for CS    HISTORY OF PRESENT ILLNESS:      The patient is a 29 y.o. female at 38w3d.   OB History        3    Para   1    Term   1            AB   1    Living   1       SAB   1    IAB        Ectopic        Molar        Multiple   0    Live Births   1            Patient presents with a chief complaint as above and is being admitted for CS, suspected LGA per [facility], very long second stage as G1 with 6.6 lb , concern for CPD at that time, declined CHERIE    Estimated Due Date: Estimated Date of Delivery: 21    PRENATAL CARE:    Complicated by: AMA, obesity, BILLIE/depression, hypothyroidism    PAST OB HISTORY:  OB History        3    Para   1    Term   1            AB   1    Living   1       SAB   1    IAB        Ectopic        Molar        Multiple   0    Live Births   1                Past Medical History:        Diagnosis Date    Abnormal Pap smear of cervix     no LEEP    Anxiety disorder     no meds    Depression     no meds     Headache(784.0)     Hypertension     PP after first baby, took baby ASA until 42 weeks    Hypothyroid     hypothyroid- on meds     Past Surgical History:        Procedure Laterality Date    BLADDER SURGERY      reflux    KNEE SURGERY      rt    MOUTH SURGERY       Allergies:  Bactrim [sulfamethoxazole-trimethoprim], Cephalexin, Macrobid [nitrofurantoin macrocrystal], and Maxalt [rizatriptan]    Social History:    Social History     Socioeconomic History    Marital status:      Spouse name: Not on file    Number of children: Not on file    Years of education: Not on file    Highest education level: Not on file   Occupational History    Not on file   Tobacco Use    Smoking status: Former Smoker     Quit date: 2018     Years since quitting: 3.8    Smokeless tobacco: Never Used   Vaping Use    Vaping Use: Never used   Substance and Sexual Activity    Alcohol use: Not Currently     Comment: social    Drug use: No    Sexual activity: Yes     Partners: Male   Other Topics Concern    Not on file   Social History Narrative    Not on file     Social Determinants of Health     Financial Resource Strain:     Difficulty of Paying Living Expenses: Not on file   Food Insecurity:     Worried About Running Out of Food in the Last Year: Not on file    [place] of Food in the Last Year: Not on file   Transportation Needs:     Lack of Transportation (Medical): Not on file    Lack of Transportation (Non-Medical):  Not on file   Physical Activity:     Days of Exercise per Week: Not on file    Minutes of Exercise per Session: Not on file   Stress:     Feeling of Stress : Not on file   Social Connections:     Frequency of Communication with Friends and Family: Not on file    Frequency of Social Gatherings with Friends and Family: Not on file    Attends Sikhism Services: Not on file    Active Member of Clubs or Organizations: Not on file    Attends Club or Organization Meetings: Not on file    Marital Status: Not on file   Intimate Partner Violence:     Fear of Current or Ex-Partner: Not on file    Emotionally Abused: Not on file    Physically Abused: Not on file    Sexually Abused: Not on file   Housing Stability:     Unable to Pay for Housing in the Last Year: Not on file    Number of [place] in the Last Year: Not on file    Unstable Housing in the Last Year: Not on file     Family History:       Problem Relation Age of Onset    High Blood Pressure Mother     Depression Mother     Diabetes Mother     Anxiety Disorder Mother     High Blood Pressure Father     Breast Cancer Maternal Aunt     Cancer Maternal Aunt     Breast Cancer Paternal Grandmother      Medications Prior to Admission:  Medications Prior to Admission: Prenatal MV-Min-Fe Fum-FA-DHA (PRENATAL 1 PO), Take 1 tablet by mouth daily  levothyroxine (SYNTHROID) 50 MCG tablet, 75 mcg Daily     REVIEW OF SYSTEMS:    anxiety    PHYSICAL EXAM:  Vitals:    11/20/21 0848 11/20/21 0856   BP:  135/86   Pulse:  105   Resp: 20    Temp:  98.3 °F (36.8 °C)   TempSrc:  Oral   Weight: 259 lb (117.5 kg)    Height: 5' 3\" (1.6 m)      General appearance:  awake, alert, cooperative, no apparent distress, and appears stated age  Neurologic:  Awake, alert, oriented to name, place and time. Lungs:  No increased work of breathing, good air exchange  Abdomen:  Soft, non tender, gravid, consistent with her gestational age, EFW by Leopold's maneuver was 7.8 lbs   Fetal heart rate:  Reassuring. Pelvis:  Adequate pelvis  Cervix: NE today  Contraction frequency: irregular, painless   Membranes:  Intact    Labs:   CBC:   Lab Results   Component Value Date    WBC 13.9 11/20/2021    RBC 3.80 11/20/2021    HGB 10.9 11/20/2021    HCT 32.8 11/20/2021    MCV 86.3 11/20/2021    MCH 28.7 11/20/2021    MCHC 33.3 11/20/2021    RDW 13.7 11/20/2021     11/20/2021    MPV 9.5 11/20/2021       ASSESSMENT AND PLAN:    For CS for suspected LGA although clinically I think baby is not LGA, declined CHERIE given prior experience with labor as G1, r/b/a d/w her and consented.   Fetus reassuring  BILLIE: will restart SSRI PP

## 2021-11-20 NOTE — OP NOTE
Operative Note      Patient: Sona Benavides  YOB: 1985  MRN: [id number]    Date of Procedure: 2021     Pre-operative Diagnosis:  Suspected LGA, declined CHERIE  AMA, obesity, depression    Post-operative Diagnosis:  Same    Procedure:  primary low transverse  section    Surgeon:  Dr Alexys Brown    Anesthesia:  Spinal anesthesia    Tubes, uterus, ovaries:  normal    Estimated blood loss:  800ml    Specimens:  none    Cord Gases: no    Drains: no    Complications:  none    Information for the patient's :  Balderrama, Baby [facility] [[id number]]          Condition:    Infant stable, transfer to General Care Nursery  Mother stable, transfer to post anesthesia recovery    OPERATIVE INDICATIONS:  The patient is a 29 y.o., White female  [id number] at 39w3d who is admitted for primary. She was adequately counseled regarding all risks, benefits and alternatives and consented for the procedure. OPERATIVE FINDINGS:   Information for the patient's :  Darrelyn Fleischer [[id number]]      . Delivered via low transverse  section. Amniotic fluid was clear. Placenta was  healthy. Uterus, tubes and ovaries were normal.      OPERATIVE DETAILS:  The patient was taken to the operating room and given spinal.  She was placed in the supine position with 15 degree Left lateral tilt. The surgical site was prepped and draped and a Hurst catheter was placed. A Pfannenstiel  incision was made  and opening was done in a routine fashion. After opening the peritoneal cavity, lower uterine segment was inspected and found to be adequate. A bladder flap was now created and bladder was dissected down using Metzenbaum scissors. At this point a transverse incision was made in the lower uterine segment and extended adequately on either side. The fetus was delivered from its Cephalic presentation without any difficulty. One loop of nuchal cord was released.  Cord was clamped and cut and the baby was handed over to the nursing staff. The placenta with its membranes was now removed. The uterus was now cleared of all membranes and blood clots. The uterine incision was closed in 2 layers first with 0 Vicryl continuous locking and second with an imbricating layer. The visceral peritoneum was now closed with 2-0 Vicryl. The parietal peritoneum was closed with 2-0 Vicryl. Hemostasis was confirmed at each level of the procedure. The rectus muscle was now closed with interrupted figure-of-eight sutures with 2-0 Vicryl. The rectus fascia was closed with 0 Vicryl continuous locking with 2 sutures beginning at either end of the incision and meeting in the midline. At this point hemostasis was again confirmed. The subcutaneous tissue was closed with 3-0 Vicryl and skin with Sutures/staples:[id number]}. A dry dressing was applied to the wound. The uterus was massaged and the vagina was emptied of all blood clots. At this point all instrument, needle and sponge counts were correct  x3.      Jonathon Kevin MD        Electronically signed by Jonathon Kevin MD on 11/20/2021 at 12:38 PM

## 2021-11-20 NOTE — ANESTHESIA PRE PROCEDURE
Department of Anesthesiology  Preprocedure Note       Name:  Deadra Councilman   Age:  29 y.o.  :  1985                                          MRN:  [id number]         Date:  2021      Surgeon: J Carlos Blankenship):  Karin Silverio MD    Procedure: Procedure(s):   SECTION    Medications prior to admission:   Prior to Admission medications    Medication Sig Start Date End Date Taking? Authorizing Provider   ibuprofen (ADVIL;MOTRIN) 800 MG tablet Take 1 tablet by mouth every 6 hours as needed for Pain 21  Yes Karin Silverio MD   oxyCODONE (ROXICODONE) 5 MG immediate release tablet Take 1 tablet by mouth every 6 hours as needed for Pain for up to 7 days. Intended supply: 7 days. Take lowest dose possible to manage pain 21 Yes Karin Silverio MD   Prenatal MV-Min-Fe Fum-FA-DHA (PRENATAL 1 PO) Take 1 tablet by mouth daily   Yes Historical Provider, MD   levothyroxine (SYNTHROID) 50 MCG tablet 75 mcg Daily  20  Yes Historical Provider, MD       Current medications:    Current Facility-Administered Medications   Medication Dose Route Frequency Provider Last Rate Last Admin    lactated ringers infusion   IntraVENous Continuous Karin Silverio MD   Stopped at 21 1048    sodium chloride flush 0.9 % injection 10 mL  10 mL IntraVENous 2 times per day Karin Silverio MD        sodium chloride flush 0.9 % injection 10 mL  10 mL IntraVENous PRN Karin Silverio MD        0.9 % sodium chloride infusion  25 mL IntraVENous PRN Karin Silverio MD   New Bag at 21 1049    oxytocin (PITOCIN) 30 units in 500 mL infusion Override Pull                Allergies:     Allergies   Allergen Reactions    Bactrim [Sulfamethoxazole-Trimethoprim]     Cephalexin     Macrobid [Nitrofurantoin Macrocrystal] Hives    Maxalt [Rizatriptan]        Problem List:    Patient Active Problem List   Diagnosis Code    Preeclampsia in postpartum period O14.95    Positive SHANTI (antinuclear antibody) R76.8    Persistent disorder of initiating or maintaining sleep G47.00    Subclinical hypothyroidism E03.8    BHAVANA (obstructive sleep apnea) G47.33    Hemangioma of left hepatic lobe D18.03    Insomnia G47.00    Chronic migraine without aura or status migrainosus G43.709    Generalized anxiety disorder with panic attacks F41.1, F41.0       Past Medical History:        Diagnosis Date    Abnormal Pap smear of cervix     no LEEP    Anxiety disorder     no meds    Depression     no meds     Headache(784.0)     Hypertension     PP after first baby, took baby ASA until 39 weeks    Hypothyroid     hypothyroid- on meds       Past Surgical History:        Procedure Laterality Date    BLADDER SURGERY      reflux    KNEE SURGERY      rt    MOUTH SURGERY         Social History:    Social History     Tobacco Use    Smoking status: Former Smoker     Quit date: 1/1/2018     Years since quitting: 3.8    Smokeless tobacco: Never Used   Substance Use Topics    Alcohol use: Not Currently     Comment: social                                Counseling given: Not Answered      Vital Signs (Current):   Vitals:    11/20/21 0848 11/20/21 0856   BP:  135/86   Pulse:  105   Resp: 20    Temp:  36.8 °C (98.3 °F)   TempSrc:  Oral   Weight: 259 lb (117.5 kg)    Height: 5' 3\" (1.6 m)                                               BP Readings from Last 3 Encounters:   11/20/21 135/86   11/20/21 (!) 120/58   11/16/21 123/82       NPO Status: Time of last liquid consumption: 2300                        Time of last solid consumption: 1800                        Date of last liquid consumption: 11/19/21                        Date of last solid food consumption: 11/19/21    BMI:   Wt Readings from Last 3 Encounters:   11/20/21 259 lb (117.5 kg)   11/16/21 259 lb (117.5 kg)   01/06/21 283 lb (128.4 kg)     Body mass index is 45.88 kg/m².     CBC:   Lab Results   Component Value Date    WBC 13.9 11/20/2021    RBC 3.80 11/20/2021    HGB 10.9 11/20/2021    HCT 32.8 11/20/2021    MCV 86.3 11/20/2021    RDW 13.7 11/20/2021     11/20/2021       CMP:   Lab Results   Component Value Date     02/05/2021    K 4.7 02/05/2021    K 4.3 01/06/2021     02/05/2021    CO2 22 02/05/2021    BUN 12 02/05/2021    CREATININE 0.7 02/05/2021    GFRAA >60 02/05/2021    GFRAA >60 12/23/2011    AGRATIO 1.4 01/06/2021    LABGLOM >60 02/05/2021    GLUCOSE 91 02/05/2021    PROT 7.6 02/05/2021    PROT 7.5 12/23/2011    CALCIUM 9.8 02/05/2021    BILITOT 0.3 02/05/2021    ALKPHOS 115 02/05/2021    AST 15 02/05/2021    ALT 11 02/05/2021       POC Tests: No results for input(s): POCGLU, POCNA, POCK, POCCL, POCBUN, POCHEMO, POCHCT in the last 72 hours. Coags: No results found for: PROTIME, INR, APTT    HCG (If Applicable):   Lab Results   Component Value Date    PREGTESTUR Negative 01/06/2021        ABGs: No results found for: PHART, PO2ART, XBQ6UGP, KGY2HOR, BEART, L1NCOSCL     Type & Screen (If Applicable):  No results found for: LABABO, LABRH    Drug/Infectious Status (If Applicable):  No results found for: HIV, HEPCAB    COVID-19 Screening (If Applicable):   Lab Results   Component Value Date    COVID19 Not Detected 11/20/2021    COVID19 Not Detected 07/28/2020           Anesthesia Evaluation  Patient summary reviewed and Nursing notes reviewed  Airway: Mallampati: II        Dental: normal exam         Pulmonary:normal exam                               Cardiovascular:                      Neuro/Psych:               GI/Hepatic/Renal: Neg GI/Hepatic/Renal ROS            Endo/Other:                     Abdominal:             Vascular: negative vascular ROS.          Other Findings:          Lab Results   Component Value Date    WBC 13.9 (H) 11/20/2021    HGB 10.9 (L) 11/20/2021    HCT 32.8 (L) 11/20/2021    MCV 86.3 11/20/2021     11/20/2021     Wt Readings from Last 3 Encounters:   11/20/21 259 lb (117.5 kg)   11/16/21 259 lb (117.5 kg)   01/06/21 283 lb (128.4 kg)     Temp Readings from Last 3 Encounters:   11/20/21 36.8 °C (98.3 °F) (Oral)   11/16/21 36.8 °C (98.2 °F) (Oral)   02/25/21 36.4 °C (97.6 °F) (Temporal)     BP Readings from Last 3 Encounters:   11/20/21 135/86   11/20/21 (!) 120/58   11/16/21 123/82     Pulse Readings from Last 3 Encounters:   11/20/21 105   11/16/21 104   02/25/21 78          Anesthesia Plan      spinal     ASA 2             Anesthetic plan and risks discussed with patient. Use of blood products discussed with patient whom. Plan discussed with CRNA and attending.     Attending anesthesiologist reviewed and agrees with Preprocedure content              FEDE Reyes - CRNA   11/20/2021

## 2021-11-21 LAB
HCT VFR BLD CALC: 26.5 % (ref 36–48)
HEMOGLOBIN: 8.9 G/DL (ref 12–16)
MCH RBC QN AUTO: 28.9 PG (ref 26–34)
MCHC RBC AUTO-ENTMCNC: 33.4 G/DL (ref 31–36)
MCV RBC AUTO: 86.5 FL (ref 80–100)
PDW BLD-RTO: 13.9 % (ref 12.4–15.4)
PLATELET # BLD: 290 K/UL (ref 135–450)
PMV BLD AUTO: 9.3 FL (ref 5–10.5)
RBC # BLD: 3.07 M/UL (ref 4–5.2)
TOTAL SYPHILLIS IGG/IGM: NORMAL
WBC # BLD: 15.6 K/UL (ref 4–11)

## 2021-11-21 PROCEDURE — 85027 COMPLETE CBC AUTOMATED: CPT

## 2021-11-21 PROCEDURE — 6360000002 HC RX W HCPCS: Performed by: OBSTETRICS & GYNECOLOGY

## 2021-11-21 PROCEDURE — 2580000003 HC RX 258: Performed by: OBSTETRICS & GYNECOLOGY

## 2021-11-21 PROCEDURE — 6370000000 HC RX 637 (ALT 250 FOR IP): Performed by: OBSTETRICS & GYNECOLOGY

## 2021-11-21 PROCEDURE — 1220000000 HC SEMI PRIVATE OB R&B

## 2021-11-21 RX ADMIN — ACETAMINOPHEN 1000 MG: 500 TABLET ORAL at 17:10

## 2021-11-21 RX ADMIN — SIMETHICONE 80 MG: 80 TABLET, CHEWABLE ORAL at 17:10

## 2021-11-21 RX ADMIN — LEVOTHYROXINE SODIUM 75 MCG: 0.07 TABLET ORAL at 07:04

## 2021-11-21 RX ADMIN — IBUPROFEN 800 MG: 800 TABLET, FILM COATED ORAL at 13:15

## 2021-11-21 RX ADMIN — FERROUS SULFATE TAB 325 MG (65 MG ELEMENTAL FE) 325 MG: 325 (65 FE) TAB at 09:09

## 2021-11-21 RX ADMIN — POLYETHYLENE GLYCOL 3350 17 G: 17 POWDER, FOR SOLUTION ORAL at 09:09

## 2021-11-21 RX ADMIN — SIMETHICONE 80 MG: 80 TABLET, CHEWABLE ORAL at 10:08

## 2021-11-21 RX ADMIN — KETOROLAC TROMETHAMINE 30 MG: 30 INJECTION, SOLUTION INTRAMUSCULAR; INTRAVENOUS at 05:30

## 2021-11-21 RX ADMIN — OXYCODONE 5 MG: 5 TABLET ORAL at 21:07

## 2021-11-21 RX ADMIN — DOCUSATE SODIUM 100 MG: 100 CAPSULE, LIQUID FILLED ORAL at 09:09

## 2021-11-21 RX ADMIN — OXYCODONE 5 MG: 5 TABLET ORAL at 15:22

## 2021-11-21 RX ADMIN — ACETAMINOPHEN 1000 MG: 500 TABLET ORAL at 09:08

## 2021-11-21 RX ADMIN — ACETAMINOPHEN 1000 MG: 500 TABLET ORAL at 01:30

## 2021-11-21 RX ADMIN — SERTRALINE 50 MG: 25 TABLET, FILM COATED ORAL at 09:09

## 2021-11-21 RX ADMIN — DOCUSATE SODIUM 100 MG: 100 CAPSULE, LIQUID FILLED ORAL at 21:07

## 2021-11-21 RX ADMIN — ONDANSETRON 4 MG: 2 INJECTION INTRAMUSCULAR; INTRAVENOUS at 10:08

## 2021-11-21 RX ADMIN — FERROUS SULFATE TAB 325 MG (65 MG ELEMENTAL FE) 325 MG: 325 (65 FE) TAB at 17:10

## 2021-11-21 RX ADMIN — FAMOTIDINE 20 MG: 20 TABLET ORAL at 12:41

## 2021-11-21 RX ADMIN — IBUPROFEN 800 MG: 800 TABLET, FILM COATED ORAL at 21:07

## 2021-11-21 RX ADMIN — SODIUM CHLORIDE, PRESERVATIVE FREE 10 ML: 5 INJECTION INTRAVENOUS at 09:09

## 2021-11-21 ASSESSMENT — PAIN SCALES - GENERAL
PAINLEVEL_OUTOF10: 4
PAINLEVEL_OUTOF10: 3
PAINLEVEL_OUTOF10: 4
PAINLEVEL_OUTOF10: 4
PAINLEVEL_OUTOF10: 5
PAINLEVEL_OUTOF10: 8
PAINLEVEL_OUTOF10: 2

## 2021-11-21 NOTE — FLOWSHEET NOTE
Had 75 ml out since bolus in 2 1/2 hrs. Dr. Agustín Preston notified and order rec'd for Lasix 20 mg IV given.

## 2021-11-21 NOTE — ANESTHESIA POSTPROCEDURE EVALUATION
Department of Anesthesiology  Postprocedure Note    Patient: Fab Rubi  MRN: [id number]  YOB: 1985  Date of evaluation: 2021  Time:  11:50 AM     Procedure Summary     Date: 21 Room / Location: [facility] OR [address] [place] [address]    Anesthesia Start: 1019 Anesthesia Stop: 6467    Procedure:  SECTION (N/A Abdomen) Diagnosis: (primary scheduled  section suspected macrosomia)    Surgeons: Emmie Appiah MD Responsible Provider: Roxana Rudolph MD    Anesthesia Type: spinal ASA Status: 2          Anesthesia Type: No value filed. Chauncey Phase I: Chauncey Score: 9    Chauncey Phase II: Chauncey Score: 10    Last vitals: Reviewed and per EMR flowsheets. Anesthesia Post Evaluation    Patient location during evaluation: bedside  Patient participation: complete - patient participated  Level of consciousness: awake and alert  Pain score: 2  Airway patency: patent  Nausea & Vomiting: no vomiting and no nausea  Complications: no  Cardiovascular status: hemodynamically stable  Respiratory status: spontaneous ventilation and room air  Hydration status: stable  Comments: Patient s/p Spinal for C/S. Pt denies residual numbness post block. Patient is ambulating and voiding without difficulty. Patient denies back pain, headache, paresthesias, pruritus or n/v.  Spinal site is free of signs of infection.   Multimodal analgesia pain management approach

## 2021-11-21 NOTE — PROGRESS NOTES
Subjective:     Postpartum Day 1:  Delivery    The patient feels well. The patient denies emotional concerns. Pain is well controlled with current medications. The baby iswell. Baby is feeding via bottle. Urinary output is adequate. The patient is ambulating well. The patient is tolerating a normal diet. Flatus has been passed. Objective:      Patient Vitals for the past 8 hrs:   BP Temp Temp src Pulse Resp SpO2   21 0913 125/60 97.9 °F (36.6 °C) Oral 74 18 --   21 0507 117/65 98 °F (36.7 °C) Oral 75 16 100 %     General:    alert, appears stated age and cooperative   Bowel Sounds:  active   Lochia:  appropriate   Uterine Fundus:   firm   Incision:  healing well, no significant drainage, no dehiscence, no significant erythema   DVT Evaluation:  No evidence of DVT seen on physical exam.     Lab Results   Component Value Date    WBC 15.6 (H) 2021    HGB 8.9 (L) 2021    HCT 26.5 (L) 2021    MCV 86.5 2021     2021        Assessment:   30 y/o [id number] s/p Primary  section. POD1 Doing well postoperatively. Plan:     Continue current care.   Ambulate TID  Pain meds atc  Discussed circumcision with parents

## 2021-11-21 NOTE — PLAN OF CARE
Problem: Discharge Planning:  Goal: Discharged to appropriate level of care  Description: Discharged to appropriate level of care  11/20/2021 2227 by Danyelle Engel RN  Outcome: Ongoing  11/20/2021 1652 by Richard Laird RN  Outcome: Met This Shift     Problem: Fluid Volume - Imbalance:  Goal: Absence of postpartum hemorrhage signs and symptoms  Description: Absence of postpartum hemorrhage signs and symptoms  11/20/2021 2227 by Danyelle Engel RN  Outcome: Ongoing  11/20/2021 1652 by Richard Laird RN  Outcome: Ongoing  Goal: Absence of imbalanced fluid volume signs and symptoms  Description: Absence of imbalanced fluid volume signs and symptoms  11/20/2021 2227 by Danyelle Engel RN  Outcome: Ongoing  11/20/2021 1652 by Richard Laird RN  Outcome: Ongoing     Problem: Infection - Surgical Site:  Goal: Will show no infection signs and symptoms  Description: Will show no infection signs and symptoms  11/20/2021 2227 by Danyelle Engel RN  Outcome: Ongoing  11/20/2021 1652 by Richard Laird RN  Outcome: Met This Shift     Problem: Mood - Altered:  Goal: Mood stable  Description: Mood stable  11/20/2021 2227 by Danyelle Engel RN  Outcome: Ongoing  11/20/2021 1652 by Richard Laird RN  Outcome: Ongoing     Problem: Nausea/Vomiting:  Goal: Absence of nausea/vomiting  Description: Absence of nausea/vomiting  11/20/2021 2227 by Danyelle Engel RN  Outcome: Ongoing  11/20/2021 1652 by Richard Laird RN  Outcome: Ongoing     Problem: Pain - Acute:  Goal: Pain level will decrease  Description: Pain level will decrease  11/20/2021 2227 by Danyelle Engel RN  Outcome: Ongoing  11/20/2021 1652 by Richard Laird RN  Outcome: Ongoing     Problem: Urinary Retention:  Goal: Urinary elimination within specified parameters  Description: Urinary elimination within specified parameters  11/20/2021 2227 by Danyelle Engel RN  Outcome: Ongoing  11/20/2021 1652 by Richard Laird RN  Outcome: Ongoing Problem: Venous Thromboembolism:  Goal: Will show no signs or symptoms of venous thromboembolism  Description: Will show no signs or symptoms of venous thromboembolism  11/20/2021 2227 by Madeleine Trinh RN  Outcome: Ongoing  11/20/2021 1652 by Magaly Jerry RN  Outcome: Met This Shift  Goal: Absence of signs or symptoms of impaired coagulation  Description: Absence of signs or symptoms of impaired coagulation  11/20/2021 2227 by Madeleine Trinh RN  Outcome: Ongoing  11/20/2021 1652 by Magaly Jerry RN  Outcome: Met This Shift     Problem: Pain:  Goal: Pain level will decrease  Description: Pain level will decrease  11/20/2021 2227 by Madeleine Trinh RN  Outcome: Ongoing  11/20/2021 1652 by Magaly Jerry RN  Outcome: Ongoing  Goal: Control of acute pain  Description: Control of acute pain  Outcome: Ongoing  Goal: Control of chronic pain  Description: Control of chronic pain  Outcome: Ongoing

## 2021-11-21 NOTE — FLOWSHEET NOTE
IV changed to to saline lock. Hurst catheter removed at this time. Tolerated well. Instructed to call for assist when she needs up to the BR. Pericare done.

## 2021-11-22 PROCEDURE — 6370000000 HC RX 637 (ALT 250 FOR IP): Performed by: OBSTETRICS & GYNECOLOGY

## 2021-11-22 PROCEDURE — 1220000000 HC SEMI PRIVATE OB R&B

## 2021-11-22 RX ORDER — FERROUS SULFATE 325(65) MG
325 TABLET ORAL
Qty: 60 TABLET | Refills: 0 | Status: SHIPPED | OUTPATIENT
Start: 2021-11-22 | End: 2022-01-31

## 2021-11-22 RX ORDER — ONDANSETRON 8 MG/1
8 TABLET, ORALLY DISINTEGRATING ORAL ONCE
Status: DISCONTINUED | OUTPATIENT
Start: 2021-11-22 | End: 2021-11-23 | Stop reason: HOSPADM

## 2021-11-22 RX ADMIN — OXYCODONE 5 MG: 5 TABLET ORAL at 01:02

## 2021-11-22 RX ADMIN — OXYCODONE 5 MG: 5 TABLET ORAL at 07:04

## 2021-11-22 RX ADMIN — POLYETHYLENE GLYCOL 3350 17 G: 17 POWDER, FOR SOLUTION ORAL at 10:56

## 2021-11-22 RX ADMIN — IBUPROFEN 800 MG: 800 TABLET, FILM COATED ORAL at 22:28

## 2021-11-22 RX ADMIN — LEVOTHYROXINE SODIUM 75 MCG: 0.07 TABLET ORAL at 07:04

## 2021-11-22 RX ADMIN — IBUPROFEN 800 MG: 800 TABLET, FILM COATED ORAL at 14:29

## 2021-11-22 RX ADMIN — IBUPROFEN 800 MG: 800 TABLET, FILM COATED ORAL at 05:09

## 2021-11-22 RX ADMIN — SERTRALINE 50 MG: 25 TABLET, FILM COATED ORAL at 21:16

## 2021-11-22 RX ADMIN — ACETAMINOPHEN 1000 MG: 500 TABLET ORAL at 18:42

## 2021-11-22 RX ADMIN — DOCUSATE SODIUM 100 MG: 100 CAPSULE, LIQUID FILLED ORAL at 10:56

## 2021-11-22 RX ADMIN — DOCUSATE SODIUM 100 MG: 100 CAPSULE, LIQUID FILLED ORAL at 21:16

## 2021-11-22 RX ADMIN — ACETAMINOPHEN 1000 MG: 500 TABLET ORAL at 01:03

## 2021-11-22 RX ADMIN — ONDANSETRON 8 MG: 8 TABLET, ORALLY DISINTEGRATING ORAL at 08:59

## 2021-11-22 RX ADMIN — ACETAMINOPHEN 1000 MG: 500 TABLET ORAL at 10:55

## 2021-11-22 RX ADMIN — FAMOTIDINE 20 MG: 20 TABLET ORAL at 20:27

## 2021-11-22 ASSESSMENT — PAIN SCALES - GENERAL
PAINLEVEL_OUTOF10: 3
PAINLEVEL_OUTOF10: 6
PAINLEVEL_OUTOF10: 8
PAINLEVEL_OUTOF10: 5
PAINLEVEL_OUTOF10: 5
PAINLEVEL_OUTOF10: 6
PAINLEVEL_OUTOF10: 8

## 2021-11-22 NOTE — PLAN OF CARE
Problem: Discharge Planning:  Goal: Discharged to appropriate level of care  Description: Discharged to appropriate level of care  11/21/2021 2015 by Mariangel Duran RN  Outcome: Ongoing  11/21/2021 1047 by Ely Linda RN  Outcome: Met This Shift     Problem: Fluid Volume - Imbalance:  Goal: Absence of postpartum hemorrhage signs and symptoms  Description: Absence of postpartum hemorrhage signs and symptoms  11/21/2021 2015 by Mariangel Duran RN  Outcome: Ongoing  11/21/2021 1047 by Ely Linda RN  Outcome: Ongoing  Goal: Absence of imbalanced fluid volume signs and symptoms  Description: Absence of imbalanced fluid volume signs and symptoms  11/21/2021 2015 by Mariangel Duran RN  Outcome: Ongoing  11/21/2021 1047 by Ely Linda RN  Outcome: Ongoing     Problem: Infection - Surgical Site:  Goal: Will show no infection signs and symptoms  Description: Will show no infection signs and symptoms  11/21/2021 2015 by Mariangel Duran RN  Outcome: Ongoing  11/21/2021 1047 by Ely Linda RN  Outcome: Met This Shift     Problem: Mood - Altered:  Goal: Mood stable  Description: Mood stable  11/21/2021 2015 by Mariangel Duran RN  Outcome: Ongoing  11/21/2021 1047 by Ely Linda RN  Outcome: Ongoing     Problem: Nausea/Vomiting:  Goal: Absence of nausea/vomiting  Description: Absence of nausea/vomiting  11/21/2021 2015 by Mariangel Duran RN  Outcome: Ongoing  11/21/2021 1047 by Ely Linda RN  Outcome: Ongoing     Problem: Pain - Acute:  Goal: Pain level will decrease  Description: Pain level will decrease  11/21/2021 2015 by Mariangel Duran RN  Outcome: Ongoing  11/21/2021 1047 by Ely Linda RN  Outcome: Ongoing     Problem: Urinary Retention:  Goal: Urinary elimination within specified parameters  Description: Urinary elimination within specified parameters  11/21/2021 2015 by Mariangel Duran RN  Outcome: Ongoing  11/21/2021 1047 by Ely Linda RN  Outcome: Met This Shift     Problem: Venous Thromboembolism:  Goal: Will show no signs or symptoms of venous thromboembolism  Description: Will show no signs or symptoms of venous thromboembolism  11/21/2021 2015 by Cody Roberts RN  Outcome: Ongoing  11/21/2021 1047 by Amber Willis RN  Outcome: Met This Shift  Goal: Absence of signs or symptoms of impaired coagulation  Description: Absence of signs or symptoms of impaired coagulation  11/21/2021 2015 by Cody Roberts RN  Outcome: Ongoing  11/21/2021 1047 by Amber Willis RN  Outcome: Met This Shift     Problem: Pain:  Goal: Pain level will decrease  Description: Pain level will decrease  11/21/2021 2015 by Cody Roberts RN  Outcome: Ongoing  11/21/2021 1047 by Amber Willis RN  Outcome: Ongoing  Goal: Control of acute pain  Description: Control of acute pain  11/21/2021 2015 by Cody Roberts RN  Outcome: Ongoing  11/21/2021 1047 by Amber Willis RN  Outcome: Ongoing  Goal: Control of chronic pain  Description: Control of chronic pain  Outcome: Ongoing

## 2021-11-22 NOTE — FLOWSHEET NOTE
Feeling miserable and can hardly move. Assisted per 2 nurses but able to finally able to scoot up in bed. Medicated with one Roxicodone.

## 2021-11-23 VITALS
BODY MASS INDEX: 45.89 KG/M2 | RESPIRATION RATE: 17 BRPM | TEMPERATURE: 98.1 F | OXYGEN SATURATION: 98 % | HEIGHT: 63 IN | HEART RATE: 61 BPM | DIASTOLIC BLOOD PRESSURE: 85 MMHG | SYSTOLIC BLOOD PRESSURE: 121 MMHG | WEIGHT: 259 LBS

## 2021-11-23 PROCEDURE — 6370000000 HC RX 637 (ALT 250 FOR IP): Performed by: OBSTETRICS & GYNECOLOGY

## 2021-11-23 RX ORDER — DOCUSATE SODIUM 100 MG/1
100 CAPSULE, LIQUID FILLED ORAL 2 TIMES DAILY
Qty: 60 CAPSULE | Refills: 1 | Status: SHIPPED | OUTPATIENT
Start: 2021-11-23 | End: 2022-01-31

## 2021-11-23 RX ADMIN — ONDANSETRON 8 MG: 8 TABLET, ORALLY DISINTEGRATING ORAL at 01:02

## 2021-11-23 RX ADMIN — FAMOTIDINE 20 MG: 20 TABLET ORAL at 08:35

## 2021-11-23 RX ADMIN — LEVOTHYROXINE SODIUM 75 MCG: 0.07 TABLET ORAL at 06:51

## 2021-11-23 RX ADMIN — ACETAMINOPHEN 1000 MG: 500 TABLET ORAL at 02:13

## 2021-11-23 RX ADMIN — POLYETHYLENE GLYCOL 3350 17 G: 17 POWDER, FOR SOLUTION ORAL at 08:35

## 2021-11-23 RX ADMIN — ACETAMINOPHEN 1000 MG: 500 TABLET ORAL at 09:59

## 2021-11-23 RX ADMIN — DOCUSATE SODIUM 100 MG: 100 CAPSULE, LIQUID FILLED ORAL at 08:35

## 2021-11-23 RX ADMIN — IBUPROFEN 800 MG: 800 TABLET, FILM COATED ORAL at 06:10

## 2021-11-23 ASSESSMENT — PAIN SCALES - GENERAL
PAINLEVEL_OUTOF10: 4
PAINLEVEL_OUTOF10: 3
PAINLEVEL_OUTOF10: 4

## 2021-11-23 NOTE — FLOWSHEET NOTE
Postpartum and infant care teaching completed and forms signed by patient. Copy witnessed by RN and given to patient. Patient verbalized understanding of all teaching points. Prescriptions filled by outpatient pharmacy. Patient plans to follow-up with [facility] Provider as instructed -- has appt for BP check next monday. Patient verbalizes understanding of discharge instructions and denies further questions. ID bands checked. Mother's ID band and one of baby's ID bands removed and taped to footprint sheet, signed by patient and witnessed by RN. Patient discharged in stable condition accompanied by family/guardian. Discharged in wheelchair, holding baby in arms.

## 2021-11-23 NOTE — PROGRESS NOTES
CLINICAL PHARMACY NOTE: MEDS TO BEDS    Total # of Prescriptions Filled: 4   The following medications were delivered to the patient:  · Oxycodone 5 mg  · Ferosul 325 mg  · Ibuprofen 800 mg  · Docusate 100 mg    Additional Documentation:    Delivered to Patient=signed  Hair García CPhT

## 2021-11-23 NOTE — PLAN OF CARE
Problem: Discharge Planning:  Goal: Discharged to appropriate level of care  Outcome: Ongoing     Problem: Fluid Volume - Imbalance:  Goal: Absence of postpartum hemorrhage signs and symptoms  Outcome: Ongoing     Problem: Infection - Surgical Site:  Goal: Will show no infection signs and symptoms  Outcome: Ongoing     Problem: Mood - Altered:  Goal: Mood stable  Outcome: Ongoing     Problem: Nausea/Vomiting:  Goal: Absence of nausea/vomiting  Outcome: Ongoing     Problem: Pain - Acute:  Goal: Pain level will decrease  Outcome: Ongoing     Problem: Venous Thromboembolism:  Goal: Will show no signs or symptoms of venous thromboembolism  Outcome: Ongoing  Goal: Absence of signs or symptoms of impaired coagulation  Outcome: Ongoing     Problem: Pain:  Goal: Pain level will decrease  Outcome: Ongoing

## 2021-11-23 NOTE — PROGRESS NOTES
S: No complaints, annalee po, pain controlled by meds, + ambulation, + flatus    O: /85   Pulse 67   Temp 98.1 °F (36.7 °C) (Oral)   Resp 17   Ht 5' 3\" (1.6 m)   Wt 259 lb (117.5 kg)   LMP 02/17/2021 (Exact Date)   SpO2 98%   Breastfeeding Unknown   BMI 45.88 kg/m²   Abd - inc c/d/i, ff below umbilicus  Ext - +1 edema    WBC/Hgb/Hct/Plts:  15.6/8.9/26.5/290 (11/21 0515)    A/P: POD #3 LTCS  1. Doing well  2. Anemia, will prescribe iron  3.  D/c home today

## 2021-11-23 NOTE — FLOWSHEET NOTE
Notified Dr. Asa Gurrola of PPD score of 11. Pt already on Zoloft--has Rx at home. No new orders received.

## 2021-12-02 ENCOUNTER — FOLLOWUP TELEPHONE ENCOUNTER (OUTPATIENT)
Dept: OBGYN | Age: 36
End: 2021-12-02

## 2021-12-02 NOTE — TELEPHONE ENCOUNTER
As you are pregnant or have recently had a baby, we would like to know how you are feeling. Please bold the answer that comes closet to how you have felt in THE PAST 7 DAYS, not just how you feel today. IN THE PAST 7 DAYS. 1. I have been able to laugh and see the funny side of things  Much as I always could  Not quite so much now  Definitely not so much now  Not at all  2. I have looked forward with enjoyment to things    As much as I ever did              Rather less than I used to   Definitely less than I used to   Hardly at all  3. I have blamed myself unnecessarily when things went wrong  Yes most of the time   Yes some of the time   Not very often   No, never  4. I have been anxious or worried for no good reason  No not at all   Hardly ever   Yes sometimes   Yes very often  5. I have felt scared or panicky for no good reason   Yes quite a lot   Yes sometimes   No not much   No not at all  6. Things have been getting me on top of me   Yes most of the time I haven't been able to cope at all. Yes,sometimes I haven't been coping as well as usual.   No, most of the time I have coped quite well   No I have been coping as well as ever  7. I have been so unhappy that I have difficulty sleeping    Yes most of the time   Yes sometimes   Not very often   No not at all  8. I have felt sad or miserable   Yes most of the time   Yes quite often   Not very often   No not at all  9. I have been so unhappy that I have been crying   Yes most of the time   Yes quite often   Only occasionally   No never  10. The thought of harming myself has occurred to me   Yes quite often   Sometimes   Hardly ever   Never   [place]  depression Scale completed via follow-up phone call. Pt scored 5. No complaints of depression. Pt instructed of PP period and when to reach out to OB if feelings of depression. Pt verbalized understanding. SCORING  Postpartum depression is the most common complication of childbearing. The 10-questions [place]  Depression Scaled (EPDS) is a valuable and efficient way of identifying patients of risk for \"\" depression. The EPIDS is easy to administer and has proven to be effective screening tool. Mother who score above 13 are likely to be suffering from a depressive illness of varying severity. The EPIDS score should not override clinical judgement. Are careful assessment should be carried out to confirm the diagnosis. The scale indicates how the mother has felt during the previous week. In doubtful cases it may be useful to repeat the tool after 2 weeks. The scale will not detect mother's with anxiety neuroses, phobias or personality disorders. Women with postpartum depression need not feed alone. They may find useful information on the web sites of the [address] [place] [address] [employer]. Questions 1,2, & 4 (without an *)  Are scored 0, 1, 2, or 3 with top box score as 0 and the bottom box scored as 3. Questions 3, 5-10 (marked with and *)  Are reverse scored, with the top  scored as a 3 and the bottom scored as 0. Maximum score:                                   30  Possible Depression :                           10 or greater  Always look at item 10            (suicidal thoughts)         Instructions for using the [place]  Depression Scale:    2. The mother is asked to check the response that comes closet to how she has been feeling         In the previous 7 days. 2.    All the items must be completed. 3.    Care should be taken to avoid the possibility of the mother discussing her answers with         Others. (Answer come from the mother or pregnant woman.)  4. The mother should complete the scale herself, unless she has limited English has            difficulty with reading.

## 2022-01-29 NOTE — PROGRESS NOTES
Tessa Watkins   29 y.o. female   1985    HPI:    Patient was last seen by me on 11/15/2021. Reason(s) for visit:   Chief Complaint   Patient presents with    Follow-up    Medication Refill   [facility]     Thyroid     OBGYN wanted PCP to take over care for thyroid and anxiety management. She stated that she feels \"great\" on Sertraline. She has been on Sertraline since Nov 21 (day after she gave birth). She will be going back to work 2 weeks and feels ready to go back to it. She stated that she \"enjoys my work. \"    Health maintenance:    BP:  -BP in office: controlled; stable  -Patient has not been checking BP readings regularly   -Patient denied issues with frequent headache, chest pain, shortness of breath, palpitations, malaise, etc.    Weight/BMI:  -Physical activity: not active - taking care of baby    Vision exam:  -Last exam: UTD  -No visual disturbances reported    Dental exam:  -Last exam: next month  -No issues reported of dental pain, bleeding gums, temperature sensitivity, etc.     Colon CA screening:  -Last exam: none  -No known [address] of colon CA   -No issues reported of hematochezia, melena, significant unintentional weight loss, chronic abdominal pain, nausea, vomiting, diarrhea, etc.    Breast CA screening:  -Last mammogram: none ever done  -Maternal aunt - currently stage IV breast CA  -No breast CA with mom  -Patient has a sister. No breast issues with either patient or her sister.     Cervical CA screening:  -Last pap smear: last year  -No known [address] of cervical CA    Immunization History   Administered Date(s) Administered    COVID-19, Pfizer Purple top, DILUTE for use, 12+ yrs, 30mcg/0.3mL dose 12/01/2021, 12/22/2021    DTaP vaccine 07/01/2011    Hepatitis A Adult (Havrix, Vaqta) 08/29/2016    Influenza Virus Vaccine 09/23/2019, 09/30/2021    Tdap (Boostrix, Adacel) 10/03/2019       Allergies   Allergen Reactions    Bactrim [Sulfamethoxazole-Trimethoprim]     Cephalexin  Macrobid [Nitrofurantoin Macrocrystal] Hives    Maxalt [Rizatriptan]        Current Outpatient Medications on File Prior to Visit   Medication Sig Dispense Refill    JENCYCLA 0.35 MG tablet       Multiple Vitamins-Minerals (MULTIVITAMIN ADULTS PO) Take by mouth       No current facility-administered medications on file prior to visit. Family History   Problem Relation Age of Onset    High Blood Pressure Mother     Depression Mother     Diabetes Mother     Anxiety Disorder Mother     High Blood Pressure Father     Breast Cancer Maternal Aunt     Cancer Maternal Aunt     Breast Cancer Paternal Grandmother        Social History     Tobacco Use    Smoking status: Former Smoker     Quit date: 2018     Years since quittin.0    Smokeless tobacco: Never Used   Substance Use Topics    Alcohol use: Not Currently     Comment: social        Lab Results   Component Value Date    WBC 15.6 (H) 2021    HGB 8.9 (L) 2021    HCT 26.5 (L) 2021    MCV 86.5 2021     2021       Chemistry        Component Value Date/Time     2021 1147    K 4.7 2021 1147    K 4.3 2021 0752     2021 1147    CO2 22 2021 1147    BUN 12 2021 1147    CREATININE 0.7 2021 1147        Component Value Date/Time    CALCIUM 9.8 2021 1147    ALKPHOS 115 2021 1147    AST 15 2021 1147    ALT 11 2021 1147    BILITOT 0.3 2021 1147          Lab Results   Component Value Date    ALT 11 2021    AST 15 2021    GGT 40 (H) 2021    ALKPHOS 115 2021    BILITOT 0.3 2021     Lab Results   Component Value Date    LABA1C 5.2 2021     Lab Results   Component Value Date    .5 2021       Review of Systems   Constitutional: Negative for activity change, appetite change, fatigue, fever and unexpected weight change.    HENT: Negative for congestion, rhinorrhea, sinus pressure and trouble swallowing. Respiratory: Negative for cough, chest tightness, shortness of breath and wheezing. Cardiovascular: Negative for chest pain, palpitations and leg swelling. Gastrointestinal: Negative for abdominal distention, abdominal pain, blood in stool, constipation, diarrhea, nausea and vomiting. Genitourinary: Negative for dysuria, frequency and hematuria. Musculoskeletal: Negative for arthralgias and back pain. Skin: Negative for rash. Neurological: Negative for dizziness, weakness, light-headedness, numbness and headaches. Wt Readings from Last 3 Encounters:   01/31/22 223 lb 3.2 oz (101.2 kg)   11/20/21 259 lb (117.5 kg)   11/16/21 259 lb (117.5 kg)       BP Readings from Last 3 Encounters:   01/31/22 118/64   11/23/21 121/85   11/20/21 (!) 120/58       Pulse Readings from Last 3 Encounters:   01/31/22 78   11/23/21 61   11/16/21 104       /64   Pulse 78   Temp 97.1 °F (36.2 °C)   Wt 223 lb 3.2 oz (101.2 kg)   LMP 01/21/2022   SpO2 98%   BMI 39.54 kg/m²      Physical Exam  Vitals reviewed. Constitutional:       General: She is awake. She is not in acute distress. Appearance: She is obese. She is not ill-appearing or diaphoretic. HENT:      Head: Normocephalic and atraumatic. No abrasion or masses. Hair is normal.      Right Ear: External ear normal.      Left Ear: External ear normal.      Nose: Nose normal.   Eyes:      General: Lids are normal. Gaze aligned appropriately. No scleral icterus. Right eye: No discharge. Left eye: No discharge. Extraocular Movements: Extraocular movements intact. Conjunctiva/sclera: Conjunctivae normal.   Neck:      Trachea: Phonation normal.   Cardiovascular:      Rate and Rhythm: Normal rate and regular rhythm. Pulmonary:      Effort: Pulmonary effort is normal. No respiratory distress. Breath sounds: No wheezing, rhonchi or rales. Abdominal:      General: Abdomen is flat. There is no distension. Palpations: Abdomen is soft. Musculoskeletal:         General: No deformity. Normal range of motion. Cervical back: Normal range of motion. No erythema. Right lower leg: No edema. Left lower leg: No edema. Skin:     Coloration: Skin is not cyanotic, jaundiced or pale. Findings: No abrasion, abscess, bruising, ecchymosis, erythema, signs of injury, laceration, lesion, petechiae, rash or wound. Neurological:      General: No focal deficit present. Mental Status: She is alert. Mental status is at baseline. GCS: GCS eye subscore is 4. GCS verbal subscore is 5. GCS motor subscore is 6. Cranial Nerves: No cranial nerve deficit, dysarthria or facial asymmetry. Motor: No weakness, tremor, atrophy or seizure activity. Coordination: Coordination normal.      Gait: Gait is intact. Psychiatric:         Attention and Perception: Attention and perception normal.         Mood and Affect: Mood and affect normal.         Speech: Speech normal.         Behavior: Behavior normal. Behavior is cooperative. Thought Content: Thought content normal.        Assessment/Plan:   Priscilla Cornelius was seen today for follow-up, medication refill and labs only. Diagnoses and all orders for this visit:    Encounter for preventative adult health care examination  -     CBC Auto Differential; Future  -     Hemoglobin A1C; Future  -     TSH without Reflex; Future  -     T4, Free; Future  -     Renal Function Panel; Future  -     Hepatic Function Panel; Future  -     Lipid Panel; Future    Hypothyroidism, unspecified type  Comments:  Stable. Orders:  -     levothyroxine (SYNTHROID) 75 MCG tablet; Take 1 tablet by mouth Daily    Mixed anxiety and depressive disorder  Comments:  Stable on Sertraline. Orders:  -     sertraline (ZOLOFT) 50 MG tablet; Take 1 tablet by mouth daily    Family history of breast cancer  Comments:  Discussed about breast CA screening guidelines.  Advised to speak about OBGYN about thoughts about getting mammogram before age 36. I reviewed the plan of care with the patient. Patient acknowledged understanding and agreed with plan of care overall. Medications Discontinued During This Encounter   Medication Reason    ferrous sulfate (IRON 325) 325 (65 Fe) MG tablet LIST CLEANUP    docusate sodium (COLACE) 100 MG capsule LIST CLEANUP    ibuprofen (ADVIL;MOTRIN) 800 MG tablet LIST CLEANUP    Prenatal MV-Min-Fe Fum-FA-DHA (PRENATAL 1 PO) LIST CLEANUP    levothyroxine (SYNTHROID) 50 MCG tablet DOSE ADJUSTMENT    sertraline (ZOLOFT) 50 MG tablet REORDER    levothyroxine (SYNTHROID) 75 MCG tablet REORDER        Lifestyle measures discussed:  -Patient was advised to monitor caloric/sugar/carb intake, consume small portion sizes, well-balanced meals (taisha with fruits & vegetables), and exercise at least 150 min/week. We discussed also reviewing nutritional labels before purchasing a product and for simplicity sake, purchase the food product that has overall less of everything. It is also recommended to avoid frequent snacking and late night eating. Dental exam: recommended every 6 months as well as directed by dentist    Visual exam: recommended annually    General information on medications:  -When it comes to medications, whether with starting or adding a new medication or increasing the dose of a current medication, the benefits and risks have to always be considered and weighed over, especially if one is taking other medications as well.    -There are no medications that have no side effects and that there is always a risk involved with taking a medication.    -If a side effect were to occur with starting a new medication or with increasing the dose of a current medication that either the medication can be totally discontinued altogether or simply decrease the dose of it and if this would be the case a follow-up appointment would be deemed necessary.    -The drug allergy list will then be updated with the corresponding side effect(s) if it's deemed to be a true 'drug allergy'. -The most common adverse effects of medication(s) were addressed at today's visit.    -Lastly, the coverage status of a medication may vary from insurance to insurance and the only way to verify if the medication is covered is to send an actual prescription in.    -The drug formulary of each insurance changes without any warning or notification to the healthcare provider let alone the pharmacy.  -The cost of medications vary from insurance to insurance and the cost is always subject to change just like the drug formulary. Follow-up: Return in about 6 months (around 7/31/2022) for anxiety/depression/bipolar, hypothyroidism . [place]uss Patient was informed that if his or her symptoms worsen to follow up with me sooner or go to the nearest ER if the symptoms are very significant and warrant higher level of care. Regarding my note:  -This note was composed (by me only and not with assistance via a scribe) to the best of my knowledge and recollection of the encounter with the patient using one of my own customized note templates utilizing a combination of typing and dictating with the [address] [place] [address] speech recognition software. As a result, the note may possibly contain various errors (e.g. spelling, grammar, and non-sensible words/phrases/statements) despite reviewing the note prior to signing it for completion. Ricci Beltrán M.D.   [address] [place] [address]    Electronically signed by Mile Cuellar M.D. on 1/31/2022 at 10:02 AM.

## 2022-01-31 ENCOUNTER — OFFICE VISIT (OUTPATIENT)
Dept: FAMILY MEDICINE CLINIC | Age: 37
End: 2022-01-31
Payer: COMMERCIAL

## 2022-01-31 VITALS
OXYGEN SATURATION: 98 % | TEMPERATURE: 97.1 F | BODY MASS INDEX: 39.54 KG/M2 | DIASTOLIC BLOOD PRESSURE: 64 MMHG | HEART RATE: 78 BPM | SYSTOLIC BLOOD PRESSURE: 118 MMHG | WEIGHT: 223.2 LBS

## 2022-01-31 DIAGNOSIS — Z80.3 FAMILY HISTORY OF BREAST CANCER: ICD-10-CM

## 2022-01-31 DIAGNOSIS — Z00.00 ENCOUNTER FOR PREVENTATIVE ADULT HEALTH CARE EXAMINATION: Primary | ICD-10-CM

## 2022-01-31 DIAGNOSIS — E03.9 HYPOTHYROIDISM, UNSPECIFIED TYPE: ICD-10-CM

## 2022-01-31 DIAGNOSIS — F41.8 MIXED ANXIETY AND DEPRESSIVE DISORDER: ICD-10-CM

## 2022-01-31 PROCEDURE — 99395 PREV VISIT EST AGE 18-39: CPT | Performed by: FAMILY MEDICINE

## 2022-01-31 RX ORDER — LEVOTHYROXINE SODIUM 0.07 MG/1
75 TABLET ORAL DAILY
Qty: 30 TABLET | Refills: 5 | Status: SHIPPED | OUTPATIENT
Start: 2022-01-31 | End: 2022-08-19 | Stop reason: SDUPTHER

## 2022-01-31 RX ORDER — NORETHINDRONE 0.35 MG/1
TABLET ORAL
COMMUNITY
Start: 2021-12-27 | End: 2022-04-28

## 2022-01-31 RX ORDER — LEVOTHYROXINE SODIUM 0.07 MG/1
TABLET ORAL
COMMUNITY
Start: 2022-01-06 | End: 2022-01-31 | Stop reason: SDUPTHER

## 2022-01-31 ASSESSMENT — ENCOUNTER SYMPTOMS
BLOOD IN STOOL: 0
ABDOMINAL DISTENTION: 0
SINUS PRESSURE: 0
DIARRHEA: 0
VOMITING: 0
TROUBLE SWALLOWING: 0
CHEST TIGHTNESS: 0
COUGH: 0
CONSTIPATION: 0
RHINORRHEA: 0
BACK PAIN: 0
ABDOMINAL PAIN: 0
WHEEZING: 0
SHORTNESS OF BREATH: 0
NAUSEA: 0

## 2022-01-31 ASSESSMENT — PATIENT HEALTH QUESTIONNAIRE - PHQ9
SUM OF ALL RESPONSES TO PHQ QUESTIONS 1-9: 0
SUM OF ALL RESPONSES TO PHQ QUESTIONS 1-9: 0
2. FEELING DOWN, DEPRESSED OR HOPELESS: 0
SUM OF ALL RESPONSES TO PHQ QUESTIONS 1-9: 0
SUM OF ALL RESPONSES TO PHQ QUESTIONS 1-9: 0
SUM OF ALL RESPONSES TO PHQ9 QUESTIONS 1 & 2: 0
1. LITTLE INTEREST OR PLEASURE IN DOING THINGS: 0

## 2022-02-16 NOTE — ED PROVIDER NOTES
[facility]        Pt Name: Skyler Christiansen  MRN: [id number]  [place] 1985  Date of evaluation: 1/6/2021  Provider: Tommie Grimm PA-C  PCP: Yarelis ROSADO. I have evaluated this patient. My supervising physician was available for consultation. CHIEF COMPLAINT       Chief Complaint   Patient presents with    Abdominal Pain     Right lower abdominal pain. Pt woke with pain after lyin adalberto that side over night. Emesis this morning d/t pain       HISTORY OF PRESENT ILLNESS   (Location, Timing/Onset, Context/Setting, Quality, Duration, Modifying Factors, Severity, Associated Signs and Symptoms)  Note limiting factors. Skyler Christiansen is a 30 y.o. female with a past medical history of sleep apnea, chronic migraines, anxiety and depression, hypothyroidism brought in today by private vehicle for complaints of right lower abdominal pain. Onset of symptoms started this morning around midnight. Duration of symptoms have been persistent since onset. Patient reports she woke up in the middle of the night around midnight with right lower abdominal pain. She states she felt nauseous and vomited a few times. She also notes dark urine and watery stools. Context includes right lower abdominal pain. No radiation of pain. Rates her pain a 7 out of 10. Denies any urinary complaints. Denies any fevers or chills. Denies any sick contacts. She states she just had a COVID-19 test which was negative. She has not taken anything at this time for the pain control. She otherwise denies any other complaints such as vaginal bleeding or discharge. No aggravating complaints. No alleviating complaints. Nothing seems to make symptoms better or worse. Nursing Notes were all reviewed and agreed with or any disagreements were addressed in the HPI.     REVIEW OF SYSTEMS    (2-9 systems for level 4, 10 or more for level 5) Patient needs a call back in reference to a referral to a pain management provider. And the patient also has questions on her the  medication that she is currently taking. Her phone number is [phone]   Review of Systems   Constitutional: Negative. Respiratory: Negative. Cardiovascular: Negative. Gastrointestinal: Positive for abdominal pain, nausea and vomiting. Genitourinary: Negative. Musculoskeletal: Negative. Skin: Negative. Positives and Pertinent negatives as per HPI. Except as noted above in the ROS, all other systems were reviewed and negative.        PAST MEDICAL HISTORY     Past Medical History:   Diagnosis Date    Depression     no meds     Headache(784.0)     Hypothyroid          SURGICAL HISTORY     Past Surgical History:   Procedure Laterality Date    BLADDER SURGERY      reflux    KNEE SURGERY      rt    MOUTH SURGERY           CURRENTMEDICATIONS       Previous Medications    AMITRIPTYLINE (ELAVIL) 10 MG TABLET    Take 1 tablet by mouth nightly    COENZYME Q10 (COQ10) 100 MG CAPS    Take 1 tablet PO three times a day    FAMOTIDINE (PEPCID) 20 MG TABLET    Take 1 tablet by mouth 2 times daily    HYDROXYZINE (VISTARIL) 25 MG CAPSULE    Take 1 capsule by mouth 3 times daily as needed for Anxiety    IBUPROFEN (ADVIL;MOTRIN) 600 MG TABLET    Take 1 tablet by mouth every 8 hours as needed for Pain    LEVOTHYROXINE (SYNTHROID) 50 MCG TABLET        OMEGA-3 FATTY ACIDS (FISH OIL) 500 MG CAPS    Take 1 capsule by mouth daily    PRENATAL VIT-FE FUMARATE-FA (PRENATAL 19 PO)    Take 1 tablet by mouth daily    VENLAFAXINE 37.5 MG EXTENDED RELEASE TABLET    Take 1 tablet by mouth daily (with breakfast)    VITAMIN C (ASCORBIC ACID) 500 MG TABLET    Take 1 tablet by mouth daily         ALLERGIES     Bactrim [sulfamethoxazole-trimethoprim], Cephalexin, Macrobid [nitrofurantoin macrocrystal], and Maxalt [rizatriptan]    FAMILYHISTORY       Family History   Problem Relation Age of Onset    High Blood Pressure Mother     Depression Mother     Diabetes Mother     Anxiety Disorder Mother     High Blood Pressure Father     Breast Cancer Maternal Aunt     Cancer Maternal Aunt General: Skin is warm and dry. Neurological:      General: No focal deficit present. Mental Status: She is alert and oriented to person, place, and time. Psychiatric:         Behavior: Behavior normal. Behavior is cooperative.          DIAGNOSTIC RESULTS   LABS:    Labs Reviewed   URINE RT REFLEX TO CULTURE - Abnormal; Notable for the following components:       Result Value    Clarity, UA CLOUDY (*)     Ketones, Urine TRACE (*)     Blood, Urine MODERATE (*)     Protein, UA 30 (*)     All other components within normal limits    Narrative:     Performed at:  [facility]  [facility]   Phone [phone]   CBC WITH AUTO DIFFERENTIAL - Abnormal; Notable for the following components:    WBC 11.2 (*)     All other components within normal limits    Narrative:     Performed at:  [facility]  [facility]   Phone [phone]   COMPREHENSIVE METABOLIC PANEL W/ REFLEX TO MG FOR LOW K - Abnormal; Notable for the following components:    Glucose 104 (*)     Alkaline Phosphatase 146 (*)     All other components within normal limits    Narrative:     Performed at:  [facility] [email or web address]   Phone [phone]   MICROSCOPIC URINALYSIS - Abnormal; Notable for the following components:    Epithelial Cells, UA 6-10 (*)     Bacteria, UA 2+ (*)     All other components within normal limits    Narrative:     Performed at:  [facility]  [facility]   Phone [phone]   PREGNANCY, URINE    Narrative:     Performed at:  [facility]  [facility]   Phone [phone]   LIPASE    Narrative:     Performed at:  [facility]  [facility]   Phone [phone] All other labs were within normal range or not returned as of this dictation. EKG: All EKG's are interpreted by the Emergency Department Physician in the absence of a cardiologist.  Please see their note for interpretation of EKG. RADIOLOGY:   Non-plain film images such as CT, Ultrasound and MRI are read by the radiologist. Plain radiographic images are visualized and preliminarily interpreted by the ED Provider with the below findings:        Interpretation per the Radiologist below, if available at the time of this note:    CT ABDOMEN PELVIS W IV CONTRAST Additional Contrast? None   Final Result   No acute abdominopelvic abnormality. No results found.         PROCEDURES   Unless otherwise noted below, none     Procedures    CRITICAL CARE TIME   N/A    CONSULTS:  None      EMERGENCY DEPARTMENT COURSE and DIFFERENTIAL DIAGNOSIS/MDM:   Vitals:    Vitals:    01/06/21 0715   BP: (!) 158/83   Pulse: 102   Resp: 16   Temp: 97.3 °F (36.3 °C)   TempSrc: Temporal   SpO2: 95%   Weight: 283 lb (128.4 kg)       Patient was given the following medications:  Medications   ketorolac (TORADOL) injection 15 mg (15 mg Intravenous Given 1/6/21 0933)   ondansetron (ZOFRAN) injection 4 mg (4 mg Intravenous Given 1/6/21 0805)   HYDROcodone-acetaminophen (NORCO) 5-325 MG per tablet 1 tablet (has no administration in time range)   prochlorperazine (COMPAZINE) injection 10 mg (has no administration in time range)   diphenhydrAMINE (BENADRYL) injection 25 mg (has no administration in time range)   0.9 % sodium chloride bolus (1,000 mLs Intravenous New Bag 1/6/21 0805)   iopamidol (ISOVUE-370) 76 % injection 75 mL (75 mLs Intravenous Given 1/6/21 0847) Patient brought in today by private vehicle for complaints of right lower abdominal pain with associated nausea and vomiting. On exam she is alert oriented afebrile breathing on room air satting at 95%. Nontoxic and in no acute respiratory distress. Old labs and records reviewed at this time. Patient received fluids Zofran and Toradol for symptomatic relief while here in the ED. Patient seen by myself. Urine pregnancy is negative. Urine reveals moderate amount of blood with trace leukocytes. Negative nitrites and leukocytes. +2 bacteria. CBC reveals a leukocytosis of 11.2. Hemoglobin of 13.9. No acute electrolyte abnormalities. Kidney function unremarkable. Lipase of 18. CT abdomen and pelvis reveals no acute abdominal pelvic abnormality. Patient reevaluated reports some improvement of pain. She states initially was an 8 and now 6. Will p.o. challenge as well as administer p.o. [place] for continued pain control. Will reevaluate after p.o. challenge. Patient tolerated p.o. here in the ED. Patient reports improvement of symptoms at this time. Plan will be to discharge home. I did advise follow-up in 24 hours with PCP. Patient told that to return immediately to the ED if she experience any new or worsening symptoms and was educated on when to return. She verbalized understand this plan was comfortable and stable at time of discharge. Patient discharged with Naprosyn and Zofran for symptomatic relief at home. I did feel comfortable sending this patient home with close follow-up instructions and strict return precautions. Patient stable at time of discharge. FINAL IMPRESSION      1. Abdominal pain, right lower quadrant    2.  Nausea and vomiting, intractability of vomiting not specified, unspecified vomiting type          DISPOSITION/PLAN   DISPOSITION Discharge - Pending Orders Complete 01/06/2021 11:02:11 AM      PATIENT REFERREDTO:  [id number] [facility] [address] [facility] [place] [address]  [phone]    Schedule an appointment as soon as possible for a visit in 1 day      [facility]  [address]  Schedule an appointment as soon as possible for a visit   If symptoms worsen      DISCHARGE MEDICATIONS:  New Prescriptions    NAPROXEN (NAPROSYN) 500 MG TABLET    Take 1 tablet by mouth 2 times daily as needed for Pain    ONDANSETRON (ZOFRAN) 4 MG TABLET    Take 1 tablet by mouth every 8 hours as needed for Nausea       DISCONTINUED MEDICATIONS:  Discontinued Medications    No medications on file              (Please note that portions of this note were completed with a voice recognition program.  Efforts were made to edit the dictations but occasionally words are mis-transcribed.)    Tommie Grimm PA-C (electronically signed)            Tommie Grimm PA-C  01/06/21 5752

## 2022-02-18 ENCOUNTER — HOSPITAL ENCOUNTER (OUTPATIENT)
Age: 37
Discharge: HOME | End: 2022-02-18
Payer: COMMERCIAL

## 2022-02-18 DIAGNOSIS — Z00.00 ENCOUNTER FOR PREVENTATIVE ADULT HEALTH CARE EXAMINATION: ICD-10-CM

## 2022-02-18 LAB
ALBUMIN SERPL-MCNC: 4.8 G/DL (ref 3.4–5)
ALP BLD-CCNC: 131 U/L (ref 40–129)
ALT SERPL-CCNC: 35 U/L (ref 10–40)
ANION GAP SERPL CALCULATED.3IONS-SCNC: 16 MMOL/L (ref 3–16)
AST SERPL-CCNC: 26 U/L (ref 15–37)
BASOPHILS ABSOLUTE: 0 K/UL (ref 0–0.2)
BASOPHILS RELATIVE PERCENT: 0.6 %
BILIRUB SERPL-MCNC: 0.4 MG/DL (ref 0–1)
BILIRUBIN DIRECT: <0.2 MG/DL (ref 0–0.3)
BILIRUBIN, INDIRECT: ABNORMAL MG/DL (ref 0–1)
BUN BLDV-MCNC: 14 MG/DL (ref 7–20)
CALCIUM SERPL-MCNC: 9.7 MG/DL (ref 8.3–10.6)
CHLORIDE BLD-SCNC: 104 MMOL/L (ref 99–110)
CHOLESTEROL, TOTAL: 232 MG/DL (ref 0–199)
CO2: 23 MMOL/L (ref 21–32)
CREAT SERPL-MCNC: 0.7 MG/DL (ref 0.6–1.1)
EOSINOPHILS ABSOLUTE: 0.2 K/UL (ref 0–0.6)
EOSINOPHILS RELATIVE PERCENT: 2.4 %
GFR AFRICAN AMERICAN: >60
GFR NON-AFRICAN AMERICAN: >60
GLUCOSE BLD-MCNC: 108 MG/DL (ref 70–99)
HCT VFR BLD CALC: 39.6 % (ref 36–48)
HDLC SERPL-MCNC: 49 MG/DL (ref 40–60)
HEMOGLOBIN: 13.3 G/DL (ref 12–16)
LDL CHOLESTEROL CALCULATED: 139 MG/DL
LYMPHOCYTES ABSOLUTE: 2.9 K/UL (ref 1–5.1)
LYMPHOCYTES RELATIVE PERCENT: 42.4 %
MCH RBC QN AUTO: 28.7 PG (ref 26–34)
MCHC RBC AUTO-ENTMCNC: 33.5 G/DL (ref 31–36)
MCV RBC AUTO: 85.7 FL (ref 80–100)
MONOCYTES ABSOLUTE: 0.5 K/UL (ref 0–1.3)
MONOCYTES RELATIVE PERCENT: 6.6 %
NEUTROPHILS ABSOLUTE: 3.3 K/UL (ref 1.7–7.7)
NEUTROPHILS RELATIVE PERCENT: 48 %
PDW BLD-RTO: 15.8 % (ref 12.4–15.4)
PHOSPHORUS: 3.9 MG/DL (ref 2.5–4.9)
PLATELET # BLD: 367 K/UL (ref 135–450)
PMV BLD AUTO: 8.8 FL (ref 5–10.5)
POTASSIUM SERPL-SCNC: 4.5 MMOL/L (ref 3.5–5.1)
RBC # BLD: 4.62 M/UL (ref 4–5.2)
SODIUM BLD-SCNC: 143 MMOL/L (ref 136–145)
T4 FREE: 1.3 NG/DL (ref 0.9–1.8)
TOTAL PROTEIN: 7.3 G/DL (ref 6.4–8.2)
TRIGL SERPL-MCNC: 221 MG/DL (ref 0–150)
TSH SERPL DL<=0.05 MIU/L-ACNC: 0.71 UIU/ML (ref 0.27–4.2)
VLDLC SERPL CALC-MCNC: 44 MG/DL
WBC # BLD: 7 K/UL (ref 4–11)

## 2022-02-18 PROCEDURE — 80069 RENAL FUNCTION PANEL: CPT

## 2022-02-18 PROCEDURE — 85025 COMPLETE CBC W/AUTO DIFF WBC: CPT

## 2022-02-18 PROCEDURE — 80061 LIPID PANEL: CPT

## 2022-02-18 PROCEDURE — 84443 ASSAY THYROID STIM HORMONE: CPT

## 2022-02-18 PROCEDURE — 36415 COLL VENOUS BLD VENIPUNCTURE: CPT

## 2022-02-18 PROCEDURE — 83036 HEMOGLOBIN GLYCOSYLATED A1C: CPT

## 2022-02-18 PROCEDURE — 84439 ASSAY OF FREE THYROXINE: CPT

## 2022-02-18 PROCEDURE — 80076 HEPATIC FUNCTION PANEL: CPT

## 2022-02-19 LAB
ESTIMATED AVERAGE GLUCOSE: 108.3 MG/DL
HBA1C MFR BLD: 5.4 %

## 2022-04-15 ENCOUNTER — OFFICE VISIT (OUTPATIENT)
Dept: ORTHOPEDIC SURGERY | Age: 37
End: 2022-04-15
Payer: COMMERCIAL

## 2022-04-15 VITALS — HEIGHT: 63 IN | WEIGHT: 213 LBS | BODY MASS INDEX: 37.74 KG/M2

## 2022-04-15 DIAGNOSIS — S93.401A SPRAIN OF RIGHT ANKLE, UNSPECIFIED LIGAMENT, INITIAL ENCOUNTER: ICD-10-CM

## 2022-04-15 DIAGNOSIS — S99.921A INJURY OF RIGHT ANKLE AND FOOT, INITIAL ENCOUNTER: Primary | ICD-10-CM

## 2022-04-15 DIAGNOSIS — S99.911A INJURY OF RIGHT ANKLE AND FOOT, INITIAL ENCOUNTER: Primary | ICD-10-CM

## 2022-04-15 PROCEDURE — 99204 OFFICE O/P NEW MOD 45 MIN: CPT | Performed by: PHYSICIAN ASSISTANT

## 2022-04-15 PROCEDURE — E0114 CRUTCH UNDERARM PAIR NO WOOD: HCPCS | Performed by: PHYSICIAN ASSISTANT

## 2022-04-15 PROCEDURE — L4361 PNEUMA/VAC WALK BOOT PRE OTS: HCPCS | Performed by: PHYSICIAN ASSISTANT

## 2022-04-15 NOTE — PROGRESS NOTES
Date:  4/15/2022    Name:  Mckinley Mendez  Address:  [address] [address]    :  1985      Age:   40 y.o.    SSN:  [id number]      Medical Record Number:  [id number]    Reason for Visit:    Chief Complaint    Injury (Right foot and ankle)      DOS:4/15/2022     HPI: Rosenda Moraes is a 40 y.o. female here today for evaluation of right ankle injury. Patient states that she rolled her ankle earlier today, felt immediate pain, and was unable to bear any weight following the injury. Patients states that shortly after she was able to walk, but this was painful. She removed her shoe when she got home and noticed significant swelling on the lateral aspect of her ankle. Denies any previous injury to this limb. Denies any numbness or tingling. Pain Assessment  Location of Pain: Foot  Location Modifiers: Right,Lateral  Severity of Pain: 9  Quality of Pain: Sharp,Aching,Other (Comment) (sore)  Duration of Pain: Persistent  Frequency of Pain: Constant  Date Pain First Started: 04/15/22  Aggravating Factors: Walking,Standing  Limiting Behavior: Yes  Result of Injury: Yes  Work-Related Injury: No  Are there other pain locations you wish to document?: No  ROS: Review of systems reviewed from Patient History Form completed today and available in the patient's chart under the Media tab.         Past Medical History:   Diagnosis Date    Abnormal Pap smear of cervix     no LEEP    Anxiety disorder     no meds    Depression     no meds     Headache(784.0)     Hypertension     PP after first baby, took baby ASA until 42 weeks    Hypothyroid     hypothyroid- on meds        Past Surgical History:   Procedure Laterality Date    BLADDER SURGERY      reflux     SECTION N/A 2021     SECTION performed by [facility] at [facility]    KNEE SURGERY      rt    MOUTH SURGERY         Family History   Problem Relation Age of Onset    High Blood Pressure Mother     Depression Mother     Diabetes Mother     Anxiety Disorder Mother     High Blood Pressure Father     Breast Cancer Maternal Aunt     Cancer Maternal Aunt     Breast Cancer Paternal Grandmother        Social History     Socioeconomic History    Marital status:      Spouse name: None    Number of children: None    Years of education: None    Highest education level: None   Occupational History    None   Tobacco Use    Smoking status: Former Smoker     Quit date: 2018     Years since quittin.2    Smokeless tobacco: Never Used   Vaping Use    Vaping Use: Never used   Substance and Sexual Activity    Alcohol use: Yes     Comment: social    Drug use: No    Sexual activity: Yes     Partners: Male   Other Topics Concern    None   Social History Narrative    None     Social Determinants of Health     Financial Resource Strain:     Difficulty of Paying Living Expenses: Not on file   Food Insecurity:     Worried About Running Out of Food in the Last Year: Not on file    Portillo of Food in the Last Year: Not on file   Transportation Needs:     Lack of Transportation (Medical): Not on file    Lack of Transportation (Non-Medical):  Not on file   Physical Activity:     Days of Exercise per Week: Not on file    Minutes of Exercise per Session: Not on file   Stress:     Feeling of Stress : Not on file   Social Connections:     Frequency of Communication with Friends and Family: Not on file    Frequency of Social Gatherings with Friends and Family: Not on file    Attends Mu-ism Services: Not on file    Active Member of Clubs or Organizations: Not on file    Attends Club or Organization Meetings: Not on file    Marital Status: Not on file   Intimate Partner Violence:     Fear of Current or Ex-Partner: Not on file    Emotionally Abused: Not on file    Physically Abused: Not on file    Sexually Abused: Not on file   Housing Stability:     Unable to Pay for Housing in the Last Year: Not on file  Number of Places Lived in the Last Year: Not on file    Unstable Housing in the Last Year: Not on file       Current Outpatient Medications   Medication Sig Dispense Refill    SPRINTEC 28 0.25-35 MG-MCG per tablet       JENCYCLA 0.35 MG tablet       Multiple Vitamins-Minerals (MULTIVITAMIN ADULTS PO) Take by mouth      sertraline (ZOLOFT) 50 MG tablet Take 1 tablet by mouth daily 30 tablet 5    levothyroxine (SYNTHROID) 75 MCG tablet Take 1 tablet by mouth Daily 30 tablet 5     No current facility-administered medications for this visit. Allergies   Allergen Reactions    Bactrim [Sulfamethoxazole-Trimethoprim]     Cephalexin     Macrobid [Nitrofurantoin Macrocrystal] Hives    Maxalt [Rizatriptan]        Vital signs:  Ht 5' 3\" (1.6 m)   Wt 213 lb (96.6 kg)   BMI 37.73 kg/m²        right Ankle Exam    Gait: Difficulty with wb    Appearance: Swelling present over the lateral aspect of the ankle    Stability: 1+ anterior drawer sign. Palpation: tenderness is present over the ATFL. No syndesmotic tenderness. Range of Motion: restriction of wrist flexion and plantarflexion secondary to pain. Strength: Mild eversion muscle weakness. Neurovascular: Skin warm well perfused. Sensation intact to light touch. No skin lesions/abrasions. Additional findings: No peroneal tendon subluxation appreciated. Diagnostics:  Radiology:       Pertinent imaging was obtained, interpreted, and reviewed with the patient today, images only - no report available. right ankle x-ray:    AP, lateral and mortise views were obtained and reviewed of the right ankle. Impression: No acute fracture or dislocation. No osseous abnormalities. There is appreciable soft tissue swelling      right foot x-ray:    AP, Oblique, Lateral views were obtained  and reviewed of the right foot. Impression: No acute fracture or dislocation. No osseous abnormalities. There is appreciable soft tissue swelling. Office Procedures:  Orders Placed This Encounter   Procedures    XR ANKLE RIGHT (MIN 3 VIEWS)     Standing Status:   Future     Number of Occurrences:   1     Standing Expiration Date:   5/15/2022    XR FOOT RIGHT (MIN 3 VIEWS)     Standing Status:   Future     Number of Occurrences:   1     Standing Expiration Date:   5/15/2022    Breg Tall Dang Walking Boot     Patient was prescribed a Breg Tall Dang Walking Boot. The right ankle will require stabilization / immobilization from this semi-rigid / rigid orthosis to improve their function. The orthosis will assist in protecting the affected area, provide functional support and facilitate healing. Patient was instructed to progress ambulation weight bearing as tolerated in the device. The patient was educated and fit by a healthcare professional with expert knowledge and specialization in brace application while under the direct supervision of the physician. Verbal and written instructions for the use of and application of this item were provided. They were instructed to contact the office immediately should the brace result in increased pain, decreased sensation, increased swelling or worsening of the condition.  Aluminum Crutches     Patient was prescribed Medline Aluminum Crutches. This mobility device is required for the following reasons:    Patient has mobility limitations that significantly impair their ability to participate in one or more mobility related activities of daily living. The patient is able to safely use the mobility device. Functional mobility deficit can be sufficiently resolved with the use of this device. Verbal and written instructions for the use of and application of this item were provided. The patient was educated and fit by a healthcare professional with expert knowledge and specialization in brace application while under the direct supervision of the treating physician.  They were instructed to contact the office immediately should the equipment result in increased pain, decreased sensation, increased swelling or worsening of the condition. Assessment: 40 y.o. female with right lateral ankle sprain    Plan: Pertinent imaging was reviewed. The etiology, natural history, and treatment options for the disorder were discussed. The roles of activity medication, antiinflammatories, injections, bracing, physical therapy, and surgical interventions were all described to the patient and questions were answered. Pb Lowe suffered a lateral ankle sprain earlier today. I am reassured by her xrays and exam is positive for TTP over ATFL, CFL, and a positive anterior drawer and pain with inversion stress test. At this time she is a candidate for a tall boot, crutches, and a follow up with Dr. La Johnson. Macy Laguna is in agreement with this plan. All questions were answered to patient's satisfaction and was encouraged to call with any further questions. Total time spent for evaluation, education, and development of treatment plan: 45 minutes    Tamra House, [facility] [place]  4/15/2022    This dictation was performed with a verbal recognition program (DRAGON) and it was checked for errors. It is possible that there are still dictated errors within this office note. If so, please bring any areas to my attention for an addendum. All efforts were made to ensure that this office note is accurate.

## 2022-04-21 ENCOUNTER — HOSPITAL ENCOUNTER (OUTPATIENT)
Age: 37
Discharge: HOME | End: 2022-04-21
Payer: COMMERCIAL

## 2022-04-21 LAB
C-REACTIVE PROTEIN, HIGH SENSITIVITY: 12.85 MG/L (ref 0.16–3)
IGA: 90 MG/DL (ref 70–400)

## 2022-04-21 PROCEDURE — 36415 COLL VENOUS BLD VENIPUNCTURE: CPT

## 2022-04-21 PROCEDURE — 82784 ASSAY IGA/IGD/IGG/IGM EACH: CPT

## 2022-04-21 PROCEDURE — 86141 C-REACTIVE PROTEIN HS: CPT

## 2022-04-21 PROCEDURE — 85652 RBC SED RATE AUTOMATED: CPT

## 2022-04-21 PROCEDURE — 83516 IMMUNOASSAY NONANTIBODY: CPT

## 2022-04-22 LAB
SEDIMENTATION RATE, ERYTHROCYTE: 28 MM/HR (ref 0–20)
TISSUE TRANSGLUTAMINASE IGA: <0.5 U/ML (ref 0–14)

## 2022-04-28 ENCOUNTER — OFFICE VISIT (OUTPATIENT)
Dept: ORTHOPEDIC SURGERY | Age: 37
End: 2022-04-28
Payer: COMMERCIAL

## 2022-04-28 VITALS — BODY MASS INDEX: 39.8 KG/M2 | WEIGHT: 224.6 LBS | HEIGHT: 63 IN

## 2022-04-28 DIAGNOSIS — S93.401A SPRAIN OF RIGHT ANKLE, UNSPECIFIED LIGAMENT, INITIAL ENCOUNTER: Primary | ICD-10-CM

## 2022-04-28 PROCEDURE — L1902 AFO ANKLE GAUNTLET PRE OTS: HCPCS | Performed by: ORTHOPAEDIC SURGERY

## 2022-04-28 PROCEDURE — 99203 OFFICE O/P NEW LOW 30 MIN: CPT | Performed by: ORTHOPAEDIC SURGERY

## 2022-04-28 RX ORDER — PANTOPRAZOLE SODIUM 40 MG/1
TABLET, DELAYED RELEASE ORAL
COMMUNITY
Start: 2022-04-21

## 2022-04-28 NOTE — PROGRESS NOTES
CHIEF COMPLAINT: Right ankle pain/ Ankle sprain. DATE OF INJURY: 4/15/22    History:  Ms. Ochoa Mckay is a 40 y.o. female presents today for the evaluation of a right ankle injury which occurred when she was rolled her ankle. She was seen in [facility] by HUSSAIN Bermudez that day and referred for for Sports Medicine consultation. She was placed in a walking boot   She is complaining of lateral ankle pain. She rates pain 3/10. Pain increases with walking and decreases with boot, ice, NSAIDs, rest and elevation. No numbness or tingling sensation, and no other complaints. Patient's occupation is [employer]. Her main physical activity is Peloton      Past Medical History:   Diagnosis Date    Abnormal Pap smear of cervix     no LEEP    Anxiety disorder     no meds    Depression     no meds     Headache(784.0)     Hypertension     PP after first baby, took baby ASA until 42 weeks    Hypothyroid     hypothyroid- on meds       Past Surgical History:   Procedure Laterality Date    BLADDER SURGERY      reflux     SECTION N/A 2021     SECTION performed by Eliud Belle [facility] at [facility]    KNEE SURGERY      rt    MOUTH SURGERY         Current Outpatient Medications on File Prior to Visit   Medication Sig Dispense Refill    SPRINTEC 28 0.25-35 MG-MCG per tablet       JENCYCLA 0.35 MG tablet       Multiple Vitamins-Minerals (MULTIVITAMIN ADULTS PO) Take by mouth      sertraline (ZOLOFT) 50 MG tablet Take 1 tablet by mouth daily 30 tablet 5    levothyroxine (SYNTHROID) 75 MCG tablet Take 1 tablet by mouth Daily 30 tablet 5     No current facility-administered medications on file prior to visit.        Allergies   Allergen Reactions    Bactrim [Sulfamethoxazole-Trimethoprim]     Cephalexin     Macrobid [Nitrofurantoin Macrocrystal] Hives    Maxalt [Rizatriptan]        Social History     Socioeconomic History    Marital status:      Spouse name: Not on file    Number of children: Not on file    Years of education: Not on file    Highest education level: Not on file   Occupational History    Not on file   Tobacco Use    Smoking status: Former Smoker     Quit date: 2018     Years since quittin.3    Smokeless tobacco: Never Used   Vaping Use    Vaping Use: Never used   Substance and Sexual Activity    Alcohol use: Yes     Comment: social    Drug use: No    Sexual activity: Yes     Partners: Male   Other Topics Concern    Not on file   Social History Narrative    Not on file     Social Determinants of Health     Financial Resource Strain:     Difficulty of Paying Living Expenses: Not on file   Food Insecurity:     Worried About Running Out of Food in the Last Year: Not on file    [place] of Food in the Last Year: Not on file   Transportation Needs:     Lack of Transportation (Medical): Not on file    Lack of Transportation (Non-Medical):  Not on file   Physical Activity:     Days of Exercise per Week: Not on file    Minutes of Exercise per Session: Not on file   Stress:     Feeling of Stress : Not on file   Social Connections:     Frequency of Communication with Friends and Family: Not on file    Frequency of Social Gatherings with Friends and Family: Not on file    Attends Taoism Services: Not on file    Active Member of [address] [place] [address] [employer] or Organizations: Not on file    Attends Club or Organization Meetings: Not on file    Marital Status: Not on file   Intimate Partner Violence:     Fear of Current or Ex-Partner: Not on file    Emotionally Abused: Not on file    Physically Abused: Not on file    Sexually Abused: Not on file   Housing Stability:     Unable to Pay for Housing in the Last Year: Not on file    Number of [place] in the Last Year: Not on file    Unstable Housing in the Last Year: Not on file       Family History   Problem Relation Age of Onset    High Blood Pressure Mother     Depression Mother     Diabetes Mother     Anxiety Disorder Mother     High Blood Pressure Father     Breast Cancer Maternal Aunt     Cancer Maternal Aunt     Breast Cancer Paternal Grandmother        Review of Systems:  I have reviewed the clinically relevant past medical history, medications, allergies, family history, social history, and 13 point Review of Systems from the patient's recent history form & documented any details relevant to today's presenting complaints in the history above. The patient's self-reported past medical history, medications, allergies, family history, social history, and Review of Systems form from 4/15/22 have been scanned into the chart under the \"Media\" tab. Physical Examination:  Ms. Radha Short is a 40 y.o. female who presents today in no acute distress, awake, alert, and oriented. Ht 5' 3\" (1.6 m)   Wt 224 lb 9.6 oz (101.9 kg)   BMI 39.79 kg/m²      Examination of the gait, showed that the patient walks heel-toe with a slightly antalgic gait and slight no limp. Right ankle ROM 30 (dorsiflexion) - 45 (plantarflexion), left ankle 30-45. There is minimal swelling that can be seen in right ankle. No change in the color right ankle. Sensation intact to light touch throughout the foot and good pedal pulses bilaterally. There is good strength in all four planes, including eversion, and has mild discomfort on deep palpation over the ATFL on the right ankle, compared to the other side. Drawer test positive on right ankle, negative on left. Talar tilt test negative right ankle, negative on left. IMAGING:  Right foot and ankle Xrays 4/15/22: reviewed. No acute fracture. Ankle mortise in anatomic position. Impression:   Right ankle sprain. Class II obesity  Hypertension      Plan:   The xray findings were reviewed with the patient and explained to her that I agree with Jr Moon assessment that the pain was likely secondary to an ankle sprain, and that it should continue to improve the next several weeks.       She will avoid heavy impact acitivty and work on peroneal muscle strength. Ice prn. The patient is advised to apply ice or cold packs intermittently 3-5x / day as needed to relieve pain. Ensure that the ice does not directly contact skin to avoid risk of frostbite. NSAIDs prn. The patient was advised that NSAID-type medications have two very important potential side effects: gastrointestinal irritation including hemorrhage and renal injuries. She was asked to take the medication with food and to stop if she experiences any GI upset. I asked her to call for vomiting, abdominal pain or black/bloody stools. The patient expresses understanding of these issues and questions were answered. PT referral provided. She can go for a few visits and transition to home exercise program.        Procedures    [place] Wraptor Ankle Brace     Patient was prescribed a [place]o Ankle Brace. The right ankle will require stabilization / immobilization from this semi-rigid / rigid orthosis to improve their function. The orthosis will assist in protecting the affected area, provide functional support and facilitate healing. Patient was instructed to progress ambulation weight bearing as tolerated in the device. The patient was educated and fit by a healthcare professional with expert knowledge and specialization in brace application while under the direct supervision of the treating physician. Verbal and written instructions for the use of and application of this item were provided. They were instructed to contact the office immediately should the brace result in increased pain, decreased sensation, increased swelling or worsening of the condition. Follow up in 6 weeks as needed. Chetan Benson. Rhona Castaneda MD  Orthopaedic Surgery and Sports Medicine     Disclaimer: This note was generated with use of a verbal recognition program and an attempt was made to check for errors.   It is possible that there are still dictated errors within this office note. If so, please bring any significant errors to my attention for an addendum. All efforts were made to ensure that this office note is accurate.

## 2022-05-02 DIAGNOSIS — F41.8 MIXED ANXIETY AND DEPRESSIVE DISORDER: ICD-10-CM

## 2022-05-04 ENCOUNTER — HOSPITAL ENCOUNTER (OUTPATIENT)
Dept: PHYSICAL THERAPY | Age: 37
Setting detail: THERAPIES SERIES
Discharge: HOME | End: 2022-05-04
Payer: COMMERCIAL

## 2022-05-04 PROCEDURE — 97016 VASOPNEUMATIC DEVICE THERAPY: CPT

## 2022-05-04 PROCEDURE — 97110 THERAPEUTIC EXERCISES: CPT

## 2022-05-04 PROCEDURE — 97161 PT EVAL LOW COMPLEX 20 MIN: CPT

## 2022-05-04 NOTE — PLAN OF CARE
[facility]  [address] [place] [address]  Phone [phone]   Fax [phone]                                                       Physical Therapy Certification    Dear Dr. Daquan Lockett,    We had the pleasure of evaluating the following patient for physical therapy services at [address] [place] [address]. A summary of our findings can be found in the initial assessment below. This includes our plan of care. If you have any questions or concerns regarding these findings, please do not hesitate to contact me at the office phone number checked above. Thank you for the referral.       Physician Signature:_______________________________Date:__________________  By signing above (or electronic signature), therapists plan is approved by physician      Patient: Wally Webber   : 1985   MRN: [id number]  Referring Physician: Dr. Daquan Lockett      Evaluation Date: 2022      Medical Diagnosis Information:  Diagnosis: Sprain of right ankle (S93.401A)   Treatment Diagnosis: Right ankle pain                                         Insurance information: PT Insurance Information: [employer] - $3500 deductible, $0 co-pay, 100% co-insurance, PT visits based on MN - auth required after 5 visits     Precautions/ Contra-indications: None    C-SSRS Triggered by Intake questionnaire (Past 2 wk assessment):   [x] No, Questionnaire did not trigger screening.   [] Yes, Patient intake triggered further evaluation      [] C-SSRS Screening completed  [] PCP notified via Plan of Care  [] Emergency services notified     Latex Allergy:  [x]NO      []YES  Preferred Language for Healthcare:   [x]English       []other:    SUBJECTIVE: Patient stated complaint: Pt reports that she [place] and [place]  Pt was not able to bear weight initially, but then [place]. Pt was very painful, swollen and bruised by that evening, so she went to after hours ortho. X-rays were (-), pt was put into boot. Currently, pt reports that her lateral ankle feels sore and her anterior ankle feels \"loose. \" Pain is worst with standing, walking, squatting, going up/down steps and lifting her 11 month old and 2 y.o. children. MD gave pt ankle brace and told her she could ride peloton in ankle brace. Relevant Medical History: Hx of R knee scope 2003, pt denies previous R ankle injuries  Functional Disability Index: FOTO = 47 (goal = 70)    Pain Scale: 3-9/10  Easing factors: boot, ice prn, previously ibuprofen  Provocative factors: standing, walking, squatting, going up/down steps, lifting, unable to drive due to boot    Type: []Constant   [x]Intermittent  []Radiating [x]Localized []other:     Numbness/Tingling: Pt denies N/T in R LE    Occupation/School:      Living Status/Prior Level of Function: Independent with ADLs and IADLs. Pt enjoys riding Peloton bike and has 2 small children (2 y.o. and 11 month old).     OBJECTIVE:     ROM LEFT RIGHT   HIP Flex     HIP Abd     HIP Ext     HIP IR     HIP ER     Knee ext     Knee Flex     Ankle PF 5 4- *pn   Ankle DF 5 4   Ankle In 5 4   Ankle Ev 5 4- *pn    Strength  LEFT RIGHT   HIP Flexors     HIP Abductors     HIP Ext     Hip ER     Knee EXT (quad)     Knee Flex (HS)     Ankle DF 12 10   Ankle PF 60 50 *pn   Ankle Inv 40 35 *pn   Ankle EV 20 20        Circumference  Figure 8 ankle     47 cm   48 cm       Balance: NT today      Reflexes/Sensation:    [x]Dermatomes/Myotomes intact    [x]Reflexes equal and normal bilaterally   []Other:    Joint mobility: talocrural joint   [x]Normal    []Hypo   []Hyper    Palpation: TTP ATFL, CFL    Functional Mobility/Transfers: Pt has difficulty with sit to stand transfers due to R ankle pain    Posture: Unremarkable    Bandages/Dressings/Incisions: N/A    Gait: (include devices/WB status) antalgic gait pattern, ambulates in boot    Orthopedic Special Tests: (+) anterior drawer, (+) talar tilt                       [x] Patient history, allergies, meds reviewed. Medical chart reviewed. See intake form. Review Of Systems (ROS):  [x]Performed Review of systems (Integumentary, CardioPulmonary, Neurological) by intake and observation. Intake form has been scanned into medical record. Patient has been instructed to contact their primary care physician regarding ROS issues if not already being addressed at this time.       Co-morbidities/Complexities (which will affect course of rehabilitation):   []None           Arthritic conditions   []Rheumatoid arthritis (M05.9)  []Osteoarthritis (M19.91)   Cardiovascular conditions   []Hypertension (I10)  []Hyperlipidemia (E78.5)  []Angina pectoris (I20)  []Atherosclerosis (I70)   Musculoskeletal conditions   []Disc pathology   []Congenital spine pathologies   []Prior surgical intervention  []Osteoporosis (M81.8)  []Osteopenia (M85.8)   Endocrine conditions   []Hypothyroid (E03.9)  []Hyperthyroid Gastrointestinal conditions   []Constipation (Q92.89)   Metabolic conditions   []Morbid obesity (E66.01)  []Diabetes type 1(E10.65) or 2 (E11.65)   []Neuropathy (G60.9)     Pulmonary conditions   []Asthma (J45)  []Coughing   []COPD (J44.9)   Psychological Disorders  [x]Anxiety (F41.9)  []Depression (F32.9)   []Other:   []Other:          Barriers to/and or personal factors that will affect rehab potential:              []Age  []Sex              []Motivation/Lack of Motivation                        [x]Co-Morbidities              []Cognitive Function, education/learning barriers              []Environmental, home barriers              []profession/work barriers  []past PT/medical experience  []other:  Justification:     Falls Risk Assessment (30 days):   [x] Falls Risk assessed and no intervention required. [] Falls Risk assessed and Patient requires intervention due to being higher risk   TUG score (>12s at risk):     [] Falls education provided, including         ASSESSMENT:   Functional Impairments:     []Noted lumbar/proximal hip/LE joint hypomobility   []Decreased LE functional ROM   [x]Decreased core/proximal hip strength and neuromuscular control   [x]Decreased LE functional strength   [x]Reduced balance/proprioceptive control   []other:      Functional Activity Limitations (from functional questionnaire and intake)   []Reduced ability to tolerate prolonged functional positions   []Reduced ability or difficulty with changes of positions or transfers between positions   []Reduced ability to maintain good posture and demonstrate good body mechanics with sitting, bending, and lifting   []Reduced ability to sleep   [x] Reduced ability or tolerance with driving and/or computer work   []Reduced ability to perform lifting, carrying tasks   [x]Reduced ability to squat   []Reduced ability to forward bend   [x]Reduced ability to ambulate prolonged functional periods/distances/surfaces   [x]Reduced ability to ascend/descend stairs   [x]Reduced ability to run, hop, cut or jump   []other:    Participation Restrictions   [x]Reduced participation in self care activities   [x]Reduced participation in home management activities   []Reduced participation in work activities   [x]Reduced participation in social activities. [x]Reduced participation in sport/recreation activities. Classification :    []Signs/symptoms consistent with post-surgical status including decreased ROM, strength and function.    [x]Signs/symptoms consistent with joint sprain/strain   []Signs/symptoms consistent with patella-femoral syndrome   []Signs/symptoms consistent with knee OA/hip OA   []Signs/symptoms consistent with internal derangement of knee/Hip   []Signs/symptoms consistent with functional hip weakness/NMR control []Signs/symptoms consistent with tendinitis/tendinosis    []signs/symptoms consistent with pathology which may benefit from Dry needling      []other:      Prognosis/Rehab Potential:      [x]Excellent   []Good    []Fair   []Poor    Tolerance of evaluation/treatment:    [x]Excellent   []Good    []Fair   []Poor    Physical Therapy Evaluation Complexity Justification  [x] A history of present problem with:  [] no personal factors and/or comorbidities that impact the plan of care;  [x]1-2 personal factors and/or comorbidities that impact the plan of care  []3 personal factors and/or comorbidities that impact the plan of care  [x] An examination of body systems using standardized tests and measures addressing any of the following: body structures and functions (impairments), activity limitations, and/or participation restrictions;:  [x] a total of 1-2 or more elements   [] a total of 3 or more elements   [] a total of 4 or more elements   [x] A clinical presentation with:  [x] stable and/or uncomplicated characteristics   [] evolving clinical presentation with changing characteristics  [] unstable and unpredictable characteristics;   [x] Clinical decision making of [x] low, [] moderate, [] high complexity using standardized patient assessment instrument and/or measurable assessment of functional outcome. [x] EVAL (LOW) 95715 (typically 30 minutes face-to-face)  [] EVAL (MOD) 26218 (typically 30 minutes face-to-face)  [] EVAL (HIGH) 93415 (typically 45 minutes face-to-face)  [] RE-EVAL     PLAN:   Frequency/Duration:  1 days per week for 6 Weeks:  Interventions:  [x]  Therapeutic exercise including: strength training, ROM, for Lower extremity and core   [x]  NMR activation and proprioception for LE, Glutes and Core   [x]  Manual therapy as indicated for LE, Hip and spine to include: Dry Needling/IASTM, STM, PROM, Gr I-IV mobilizations, manipulation.    [x] Modalities as needed that may include: thermal agents, E-stim, Biofeedback, US, iontophoresis as indicated  [x] Patient education on joint protection, postural re-education, activity modification, progression of HEP. HEP instruction: (see scanned forms)    GOALS:  Patient stated goal: \"To walk without pain and to get back to exercising\"  [] Progressing: [] Met: [] Not Met: [] Adjusted    Therapist goals for Patient:   Short Term Goals: To be achieved in: 2 weeks  1. Independent in HEP and progression per patient tolerance, in order to prevent re-injury. [] Progressing: [] Met: [] Not Met: [] Adjusted  2. Patient will have a decrease in pain to facilitate improvement in movement, function, and ADLs as indicated by Functional Deficits. [] Progressing: [] Met: [] Not Met: [] Adjusted    Long Term Goals: To be achieved in: 6 weeks  1. Disability index score of 70 or greater for the FOTO to assist with reaching prior level of function. [] Progressing: [] Met: [] Not Met: [] Adjusted  2. Patient will demonstrate increased AROM to WNL for R ankle to allow for proper joint functioning as indicated by patients Functional Deficits. [] Progressing: [] Met: [] Not Met: [] Adjusted  3. Patient will demonstrate an increase in Strength to good proximal hip strength and control, within 5lb HHD in LE to allow for proper functional mobility as indicated by patients Functional Deficits. [] Progressing: [] Met: [] Not Met: [] Adjusted  4. Patient will return to walking for 30 minutes without increased symptoms or restriction. [] Progressing: [] Met: [] Not Met: [] Adjusted  5. Patient will be able to walk up and down 1 flight of steps without increased symptoms or restriction.      [] Progressing: [] Met: [] Not Met: [] Adjusted     Electronically signed by:  Li Steele, PT

## 2022-05-04 NOTE — FLOWSHEET NOTE
[employer]    Physical Therapy Treatment Note/ Progress Report:     Date:  2022    Patient Name:  María Elena Taylor    :  1985  MRN: [id number]  Medical/Treatment Diagnosis Information:  · Diagnosis: Sprain of right ankle (S93.401A)  · Treatment Diagnosis: Right ankle pain  Insurance/Certification information:  PT Insurance Information: [employer] - $3500 deductible, $0 co-pay, 100% co-insurance, PT visits based on [place] - auth required after 5 visits  Physician Information:  Dr. Ricky Stewart of care signed (Y/N):     Date of Patient follow up with Physician:      Progress Report: []  Yes  [x]  No     Functional Scale: FOTO = 47   Date: 22    Date Range for reporting period:  Beginnin22  Ending:      Progress report due (10 Rx/or 30 days whichever is less): 1/3/24     Recertification due (POC duration/ or 90 days whichever is less): 22    Visit # Insurance Allowable Auth Needed   1 [place] - auth required after 5 visits [x]Yes    []No     Pain level:  3-910     SUBJECTIVE:  See eval    OBJECTIVE: See eval   Observation:    Test measurements:      RESTRICTIONS/PRECAUTIONS: grade 2 R lateral ankle sprain    Exercises/Interventions:     Therapeutic Ex 15' Resistance Sets/sec Reps Notes          Seated calf stretch  30\" 3    Ankle ABC's  1 2x    Ankle 4 way TB red 2 10    Seated HR  2 10                                                       Therapeutic Activities                                                               Manual Intervention       Knee mobs/PROM       Tib/Fem Mobs       Patella Mobs       Ankle mobs                     NMR re-education                                                                          Therapeutic Exercise and NMR EXR  [x] (61782) Provided verbal/tactile cueing for activities related to strengthening, flexibility, endurance, ROM for improvements in LE, proximal hip, and core control with self care, mobility, lifting, ambulation.  [] (47335) Provided verbal/tactile cueing for activities related to improving balance, coordination, kinesthetic sense, posture, motor skill, proprioception  to assist with LE, proximal hip, and core control in self care, mobility, lifting, ambulation and eccentric single leg control. NMR and Therapeutic Activities:    [] (49368 or 18441) Provided verbal/tactile cueing for activities related to improving balance, coordination, kinesthetic sense, posture, motor skill, proprioception and motor activation to allow for proper function of core, proximal hip and LE with self care and ADLs  [] (99218) Gait Re-education- Provided training and instruction to the patient for proper LE, core and proximal hip recruitment and positioning and eccentric body weight control with ambulation re-education including up and down stairs     Home Exercise Program:    [x] (86805) Reviewed/Progressed HEP activities related to strengthening, flexibility, endurance, ROM of core, proximal hip and LE for functional self-care, mobility, lifting and ambulation/stair navigation   [] (61837)Reviewed/Progressed HEP activities related to improving balance, coordination, kinesthetic sense, posture, motor skill, proprioception of core, proximal hip and LE for self care, mobility, lifting, and ambulation/stair navigation      Manual Treatments:  PROM / STM / Oscillations-Mobs:  G-I, II, III, IV (PA's, Inf., Post.)  [] (22755) Provided manual therapy to mobilize LE, proximal hip and/or LS spine soft tissue/joints for the purpose of modulating pain, promoting relaxation,  increasing ROM, reducing/eliminating soft tissue swelling/inflammation/restriction, improving soft tissue extensibility and allowing for proper ROM for normal function with self care, mobility, lifting and ambulation.      Modalities: 10' vasopneumatic compression to decrease ankle swelling and inflammation     Charges:  Timed Code Treatment Minutes: 15   Total Treatment Minutes: 50       [x] EVAL (LOW) 64041 (typically 20 minutes face-to-face)  [] EVAL (MOD) 81490 (typically 30 minutes face-to-face)  [] EVAL (HIGH) 42641 (typically 45 minutes face-to-face)  [] RE-EVAL     [x] QK(86208) x  1   [] IONTO (14271)  [] NMR (48591) x     [x] VASO (07226)  [] Manual (76069) x     [] Other:  [] TA (27730)x     [] Mech Traction (93759)  [] ES(attended) (33627)     [] ES (un) (73931): If BWC Please Indicate Time In/Out and Total Minutes  CPT Code Time in Time out Total Min                                           GOALS:   Patient stated goal: \"To walk without pain and to get back to exercising\"  []? Progressing: []? Met: []? Not Met: []? Adjusted     Therapist goals for Patient:   Short Term Goals: To be achieved in: 2 weeks  1. Independent in HEP and progression per patient tolerance, in order to prevent re-injury. []? Progressing: []? Met: []? Not Met: []? Adjusted  2. Patient will have a decrease in pain to facilitate improvement in movement, function, and ADLs as indicated by Functional Deficits. []? Progressing: []? Met: []? Not Met: []? Adjusted     Long Term Goals: To be achieved in: 6 weeks  1. Disability index score of 70 or greater for the FOTO to assist with reaching prior level of function. []? Progressing: []? Met: []? Not Met: []? Adjusted  2. Patient will demonstrate increased AROM to WNL for R ankle to allow for proper joint functioning as indicated by patients Functional Deficits. []? Progressing: []? Met: []? Not Met: []? Adjusted  3. Patient will demonstrate an increase in Strength to good proximal hip strength and control, within 5lb HHD in LE to allow for proper functional mobility as indicated by patients Functional Deficits. []? Progressing: []? Met: []? Not Met: []? Adjusted  4. Patient will return to walking for 30 minutes without increased symptoms or restriction. []? Progressing: []? Met: []? Not Met: []? Adjusted  5. Patient will be able to walk up and down 1 flight of steps without increased symptoms or restriction. []? Progressing: []? Met: []? Not Met: []? Adjusted            Progression Towards Functional goals:  [] Patient is progressing as expected towards functional goals listed. [] Progression is slowed due to complexities listed. [] Progression has been slowed due to co-morbidities. [x] Plan just implemented, too soon to assess goals progression  [] Other:     ASSESSMENT:  See eval    Return to Play: (if applicable)   []  Stage 1: Intro to Strength   []  Stage 2: Return to Run and Strength   []  Stage 3: Return to Jump and Strength   []  Stage 4: Dynamic Strength and Agility   []  Stage 5: Sport Specific Training     []  Ready to Return to Play, Meets All Above Stages   []  Not Ready for Return to Sports   Comments:            Treatment/Activity Tolerance:  [x] Patient tolerated treatment well [] Patient limited by fatique  [] Patient limited by pain  [] Patient limited by other medical complications  [] Other:     Overall Progression Towards Functional goals/ Treatment Progress Update:  [] Patient is progressing as expected towards functional goals listed. [] Progression is slowed due to complexities/Impairments listed. [] Progression has been slowed due to co-morbidities.   [x] Plan just implemented, too soon to assess goals progression <30days   [] Goals require adjustment due to lack of progress  [] Patient is not progressing as expected and requires additional follow up with physician  [] Other    Prognosis for POC: [x] Good [] Fair  [] Poor    Patient requires continued skilled intervention: [x] Yes  [] No        PLAN: See eval  [] Continue per plan of care [] Alter current plan (see comments)  [x] Plan of care initiated [] Hold pending MD visit [] Discharge    Electronically signed by: Jae Syed PT    Note: If patient does not return for scheduled/recommended follow up visits, this note will serve as a discharge from care along with the most recent update on progress.

## 2022-05-10 ENCOUNTER — HOSPITAL ENCOUNTER (OUTPATIENT)
Dept: PHYSICAL THERAPY | Age: 37
Setting detail: THERAPIES SERIES
Discharge: HOME | End: 2022-05-10
Payer: COMMERCIAL

## 2022-05-10 PROCEDURE — 97140 MANUAL THERAPY 1/> REGIONS: CPT

## 2022-05-10 PROCEDURE — 97110 THERAPEUTIC EXERCISES: CPT

## 2022-05-10 NOTE — FLOWSHEET NOTE
[address], [facility]    Physical Therapy Treatment Note/ Progress Report:     Date:  5/10/2022    Patient Name:  Jose Carlos Mir    :  1985  MRN: [id number]  Medical/Treatment Diagnosis Information:  · Diagnosis: Sprain of right ankle (S93.401A)  · Treatment Diagnosis: Right ankle pain  Insurance/Certification information:  PT Insurance Information: [employer] - $3500 deductible, $0 co-pay, 100% co-insurance, PT visits based on [place] - auth required after 5 visits  Physician Information:  Dr. Chema Huynh of care signed (Y/N):     Date of Patient follow up with Physician:      Progress Report: []  Yes  [x]  No     Functional Scale: FOTO = 47   Date: 22    Date Range for reporting period:  Beginnin22  Ending:      Progress report due (10 Rx/or 30 days whichever is less):      Recertification due (POC duration/ or 90 days whichever is less): 22    Visit # Insurance Allowable Auth Needed   2 [place] - auth required after 5 visits [x]Yes    []No     Pain level:  3-9/10     SUBJECTIVE:  Pt reports that her ankle is feeling better overall, still having some discomfort on bottom of foot. Pt eager to get out of boot and get back to driving.      OBJECTIVE: See eval   Observation:    Test measurements:      RESTRICTIONS/PRECAUTIONS: grade 2 R lateral ankle sprain    Exercises/Interventions:     Therapeutic Ex 30' Resistance Sets/sec Reps Notes   Bike    NPV   Seated calf stretch  30\" 3    Incline calf stretch  30\" 3    Ankle ABC's  1 2x    Ankle 4 way TB Red/green 2 10 Red IV, green DF/PF/EV   Seated HR  2 10    Standing HR  2 10                                                Therapeutic Activities                                                               Manual Intervention 10'       Knee mobs/PROM       Tib/Fem Mobs       Patella Mobs       Ankle mobs 5'   TC distraction   STM/IASTM 5'   gastroc          NMR re-education 3' Tandem stance ground 20\" 3                                                         Therapeutic Exercise and NMR EXR  [x] (85882) Provided verbal/tactile cueing for activities related to strengthening, flexibility, endurance, ROM for improvements in LE, proximal hip, and core control with self care, mobility, lifting, ambulation.  [] (56867) Provided verbal/tactile cueing for activities related to improving balance, coordination, kinesthetic sense, posture, motor skill, proprioception  to assist with LE, proximal hip, and core control in self care, mobility, lifting, ambulation and eccentric single leg control.      NMR and Therapeutic Activities:    [x] (50118 or 00506) Provided verbal/tactile cueing for activities related to improving balance, coordination, kinesthetic sense, posture, motor skill, proprioception and motor activation to allow for proper function of core, proximal hip and LE with self care and ADLs  [] (75765) Gait Re-education- Provided training and instruction to the patient for proper LE, core and proximal hip recruitment and positioning and eccentric body weight control with ambulation re-education including up and down stairs     Home Exercise Program:    [x] (40610) Reviewed/Progressed HEP activities related to strengthening, flexibility, endurance, ROM of core, proximal hip and LE for functional self-care, mobility, lifting and ambulation/stair navigation   [] (14333)Reviewed/Progressed HEP activities related to improving balance, coordination, kinesthetic sense, posture, motor skill, proprioception of core, proximal hip and LE for self care, mobility, lifting, and ambulation/stair navigation      Manual Treatments:  PROM / STM / Oscillations-Mobs:  G-I, II, III, IV (PA's, Inf., Post.)  [x] (30422) Provided manual therapy to mobilize LE, proximal hip and/or LS spine soft tissue/joints for the purpose of modulating pain, promoting relaxation,  increasing ROM, reducing/eliminating soft tissue swelling/inflammation/restriction, improving soft tissue extensibility and allowing for proper ROM for normal function with self care, mobility, lifting and ambulation. Modalities:  ice to go     Charges:  Timed Code Treatment Minutes: 43   Total Treatment Minutes: 43       [] EVAL (LOW) 99853 (typically 20 minutes face-to-face)  [] EVAL (MOD) 84528 (typically 30 minutes face-to-face)  [] EVAL (HIGH) 32211 (typically 45 minutes face-to-face)  [] RE-EVAL     [x] FL(99191) x  2   [] IONTO (41042)  [] NMR (49380) x     [] VASO (25387)  [x] Manual (26733) x 1     [] Other:  [] TA (21842)x     [] Mech Traction (49347)  [] ES(attended) (61052)     [] ES (un) (39238): If BWC Please Indicate Time In/Out and Total Minutes  CPT Code Time in Time out Total Min                                           GOALS:   Patient stated goal: \"To walk without pain and to get back to exercising\"  []? Progressing: []? Met: []? Not Met: []? Adjusted     Therapist goals for Patient:   Short Term Goals: To be achieved in: 2 weeks  1. Independent in HEP and progression per patient tolerance, in order to prevent re-injury. []? Progressing: []? Met: []? Not Met: []? Adjusted  2. Patient will have a decrease in pain to facilitate improvement in movement, function, and ADLs as indicated by Functional Deficits. []? Progressing: []? Met: []? Not Met: []? Adjusted     Long Term Goals: To be achieved in: 6 weeks  1. Disability index score of 70 or greater for the FOTO to assist with reaching prior level of function. []? Progressing: []? Met: []? Not Met: []? Adjusted  2. Patient will demonstrate increased AROM to WNL for R ankle to allow for proper joint functioning as indicated by patients Functional Deficits. []? Progressing: []? Met: []? Not Met: []? Adjusted  3.  Patient will demonstrate an increase in Strength to good proximal hip strength and control, within 5lb HHD in LE to allow for proper functional mobility as indicated by patients Functional Deficits. []? Progressing: []? Met: []? Not Met: []? Adjusted  4. Patient will return to walking for 30 minutes without increased symptoms or restriction. []? Progressing: []? Met: []? Not Met: []? Adjusted  5. Patient will be able to walk up and down 1 flight of steps without increased symptoms or restriction. []? Progressing: []? Met: []? Not Met: []? Adjusted            Progression Towards Functional goals:  [] Patient is progressing as expected towards functional goals listed. [] Progression is slowed due to complexities listed. [] Progression has been slowed due to co-morbidities. [x] Plan just implemented, too soon to assess goals progression  [] Other:     ASSESSMENT:  Good response to manual therapy today with decreased calf soreness noted afterwards. Improved ankle AROM with decreased lateral ankle soreness noted today compared to last session. Progressed to standing HR and progressed proprioception today with fatigue noted, no increased pain. Discussed use of self-STM/tennis ball on plantar fascia for HEP. Return to Play: (if applicable)   []  Stage 1: Intro to Strength   []  Stage 2: Return to Run and Strength   []  Stage 3: Return to Jump and Strength   []  Stage 4: Dynamic Strength and Agility   []  Stage 5: Sport Specific Training     []  Ready to Return to Play, Meets All Above Stages   []  Not Ready for Return to Sports   Comments:            Treatment/Activity Tolerance:  [x] Patient tolerated treatment well [] Patient limited by fatique  [] Patient limited by pain  [] Patient limited by other medical complications  [] Other:     Overall Progression Towards Functional goals/ Treatment Progress Update:  [] Patient is progressing as expected towards functional goals listed. [] Progression is slowed due to complexities/Impairments listed. [] Progression has been slowed due to co-morbidities.   [x] Plan just implemented, too soon to assess goals progression <30days   [] Goals require adjustment due to lack of progress  [] Patient is not progressing as expected and requires additional follow up with physician  [] Other    Prognosis for POC: [x] Good [] Fair  [] Poor    Patient requires continued skilled intervention: [x] Yes  [] No        PLAN: Improve ankle strength, stability and proprioception as tolerated per pt. Wean out of boot as able. [x] Continue per plan of care [] Alter current plan (see comments)  [] Plan of care initiated [] Hold pending MD visit [] Discharge    Electronically signed by: Brian Elder PT    Note: If patient does not return for scheduled/recommended follow up visits, this note will serve as a discharge from care along with the most recent update on progress.

## 2022-05-16 ENCOUNTER — HOSPITAL ENCOUNTER (OUTPATIENT)
Dept: PHYSICAL THERAPY | Age: 37
Setting detail: THERAPIES SERIES
Discharge: HOME | End: 2022-05-16
Payer: COMMERCIAL

## 2022-05-16 PROCEDURE — 97110 THERAPEUTIC EXERCISES: CPT

## 2022-05-16 PROCEDURE — 97112 NEUROMUSCULAR REEDUCATION: CPT

## 2022-05-16 PROCEDURE — 97140 MANUAL THERAPY 1/> REGIONS: CPT

## 2022-05-16 NOTE — FLOWSHEET NOTE
[facility]    Physical Therapy Treatment Note/ Progress Report:     Date:  2022    Patient Name:  Kenroy Holland    :  1985  MRN: [id number]  Medical/Treatment Diagnosis Information:  · Diagnosis: Sprain of right ankle (S93.401A)  · Treatment Diagnosis: Right ankle pain  Insurance/Certification information:  PT Insurance Information: [employer] - $3500 deductible, $0 co-pay, 100% co-insurance, PT visits based on [place] - auth required after 5 visits  Physician Information:  Dr. Myles Ellis of care signed (Y/N):     Date of Patient follow up with Physician:      Progress Report: []  Yes  [x]  No     Functional Scale: FOTO = 47   Date: 22    Date Range for reporting period:  Beginnin22  Ending:      Progress report due (10 Rx/or 30 days whichever is less): 3/7/22     Recertification due (POC duration/ or 90 days whichever is less): 22    Visit # Insurance Allowable Auth Needed   3 MN - auth required after 5 visits [x]Yes    []No     Pain level:  3/10     SUBJECTIVE:  Pt reports that her ankle is doing better overall and bottom of foot is feeling better as well. Pt reports that she started driving with no increased discomfort in ankle. Pt was at concert on Saturday which required a lot more walking and standing than pt has been doing. Pt states that her ankle was more swollen afterwards, but returned to baseline once she iced a couple times.       OBJECTIVE: See eval   Observation:    Test measurements:      RESTRICTIONS/PRECAUTIONS: grade 2 R lateral ankle sprain    Exercises/Interventions:     Therapeutic Ex 25' Resistance Sets/sec Reps Notes             Incline calf stretch  30\" 3       Ankle 4 way TB blue 2 10       Standing HR  2 10 W/ 1/2 foam roll   Leg press DL 85# 2 10     Lateral bandwalking orange 1 3 laps                                  Therapeutic Activities Manual Intervention 10'       Knee mobs/PROM       Tib/Fem Mobs       Patella Mobs       Ankle mobs 5'   TC distraction   STM/IASTM 5'   gastroc          NMR re-education 10'              Tandem stance airmat 20\" 3    SLS ground 15-20\" 3    Mini bosu lunge iso's  3\" 15                                           Therapeutic Exercise and NMR EXR  [x] (37949) Provided verbal/tactile cueing for activities related to strengthening, flexibility, endurance, ROM for improvements in LE, proximal hip, and core control with self care, mobility, lifting, ambulation.  [] (95539) Provided verbal/tactile cueing for activities related to improving balance, coordination, kinesthetic sense, posture, motor skill, proprioception  to assist with LE, proximal hip, and core control in self care, mobility, lifting, ambulation and eccentric single leg control.      NMR and Therapeutic Activities:    [x] (93420 or 60329) Provided verbal/tactile cueing for activities related to improving balance, coordination, kinesthetic sense, posture, motor skill, proprioception and motor activation to allow for proper function of core, proximal hip and LE with self care and ADLs  [] (02703) Gait Re-education- Provided training and instruction to the patient for proper LE, core and proximal hip recruitment and positioning and eccentric body weight control with ambulation re-education including up and down stairs     Home Exercise Program:    [x] (28122) Reviewed/Progressed HEP activities related to strengthening, flexibility, endurance, ROM of core, proximal hip and LE for functional self-care, mobility, lifting and ambulation/stair navigation   [] (57373)Reviewed/Progressed HEP activities related to improving balance, coordination, kinesthetic sense, posture, motor skill, proprioception of core, proximal hip and LE for self care, mobility, lifting, and ambulation/stair navigation      Manual Treatments:  PROM / STM / Oscillations-Mobs:  G-I, II, III, IV (Elias, Inf., Post.)  [x] (04818) Provided manual therapy to mobilize LE, proximal hip and/or LS spine soft tissue/joints for the purpose of modulating pain, promoting relaxation,  increasing ROM, reducing/eliminating soft tissue swelling/inflammation/restriction, improving soft tissue extensibility and allowing for proper ROM for normal function with self care, mobility, lifting and ambulation. Modalities:  ice to go     Charges:  Timed Code Treatment Minutes: 45   Total Treatment Minutes: 45       [] EVAL (LOW) 04281 (typically 20 minutes face-to-face)  [] EVAL (MOD) 29047 (typically 30 minutes face-to-face)  [] EVAL (HIGH) 64489 (typically 45 minutes face-to-face)  [] RE-EVAL     [x] KV(26898) x  1   [] IONTO (94993)  [x] NMR (47053) x 1    [] VASO (14967)  [x] Manual (79327) x 1     [] Other:  [] TA (01515)x     [] Mech Traction (01220)  [] ES(attended) (58024)     [] ES (un) (66914): If BWC Please Indicate Time In/Out and Total Minutes  CPT Code Time in Time out Total Min                                           GOALS:   Patient stated goal: \"To walk without pain and to get back to exercising\"  []? Progressing: []? Met: []? Not Met: []? Adjusted     Therapist goals for Patient:   Short Term Goals: To be achieved in: 2 weeks  1. Independent in HEP and progression per patient tolerance, in order to prevent re-injury. []? Progressing: []? Met: []? Not Met: []? Adjusted  2. Patient will have a decrease in pain to facilitate improvement in movement, function, and ADLs as indicated by Functional Deficits. []? Progressing: []? Met: []? Not Met: []? Adjusted     Long Term Goals: To be achieved in: 6 weeks  1. Disability index score of 70 or greater for the FOTO to assist with reaching prior level of function. []? Progressing: []? Met: []? Not Met: []? Adjusted  2.  Patient will demonstrate increased AROM to WNL for R ankle to allow for proper joint functioning as indicated by patients Functional Deficits. []? Progressing: []? Met: []? Not Met: []? Adjusted  3. Patient will demonstrate an increase in Strength to good proximal hip strength and control, within 5lb HHD in LE to allow for proper functional mobility as indicated by patients Functional Deficits. []? Progressing: []? Met: []? Not Met: []? Adjusted  4. Patient will return to walking for 30 minutes without increased symptoms or restriction. []? Progressing: []? Met: []? Not Met: []? Adjusted  5. Patient will be able to walk up and down 1 flight of steps without increased symptoms or restriction. []? Progressing: []? Met: []? Not Met: []? Adjusted            Progression Towards Functional goals:  [] Patient is progressing as expected towards functional goals listed. [] Progression is slowed due to complexities listed. [] Progression has been slowed due to co-morbidities. [x] Plan just implemented, too soon to assess goals progression  [] Other:     ASSESSMENT:  Pt continues to respond well to manual therapy with decreased stiffness and TTP noted to medial gastroc today. Progressed ankle strength, stability and proprioception today with mild fatigue noted, no increased ankle pain or swelling. Discussed with pt about weaning into brace starting with 1-2 hours initially and progressing about 1 hour per day pending swelling, pain and limping status.      Return to Play: (if applicable)   []  Stage 1: Intro to Strength   []  Stage 2: Return to Run and Strength   []  Stage 3: Return to Jump and Strength   []  Stage 4: Dynamic Strength and Agility   []  Stage 5: Sport Specific Training     []  Ready to Return to Play, Meets All Above Stages   []  Not Ready for Return to Sports   Comments:            Treatment/Activity Tolerance:  [x] Patient tolerated treatment well [] Patient limited by fatique  [] Patient limited by pain  [] Patient limited by other medical complications  [] Other:     Overall Progression Towards Functional goals/ Treatment Progress Update:  [] Patient is progressing as expected towards functional goals listed. [] Progression is slowed due to complexities/Impairments listed. [] Progression has been slowed due to co-morbidities. [x] Plan just implemented, too soon to assess goals progression <30days   [] Goals require adjustment due to lack of progress  [] Patient is not progressing as expected and requires additional follow up with physician  [] Other    Prognosis for POC: [x] Good [] Fair  [] Poor    Patient requires continued skilled intervention: [x] Yes  [] No        PLAN: Improve ankle strength, stability and proprioception as tolerated per pt. Pt to wean out of boot and into ankle brace per discussion today. Continue with PT 1x/week. [x] Continue per plan of care [] Alter current plan (see comments)  [] Plan of care initiated [] Hold pending MD visit [] Discharge    Electronically signed by: Tara Anne PT    Note: If patient does not return for scheduled/recommended follow up visits, this note will serve as a discharge from care along with the most recent update on progress.

## 2022-05-18 ENCOUNTER — APPOINTMENT (OUTPATIENT)
Dept: PHYSICAL THERAPY | Age: 37
End: 2022-05-18
Payer: COMMERCIAL

## 2022-05-24 ENCOUNTER — HOSPITAL ENCOUNTER (OUTPATIENT)
Dept: PHYSICAL THERAPY | Age: 37
Setting detail: THERAPIES SERIES
Discharge: HOME | End: 2022-05-24
Payer: COMMERCIAL

## 2022-05-24 PROCEDURE — 97112 NEUROMUSCULAR REEDUCATION: CPT

## 2022-05-24 PROCEDURE — 97110 THERAPEUTIC EXERCISES: CPT

## 2022-06-02 ENCOUNTER — HOSPITAL ENCOUNTER (OUTPATIENT)
Dept: PHYSICAL THERAPY | Age: 37
Setting detail: THERAPIES SERIES
Discharge: HOME | End: 2022-06-02

## 2022-06-02 NOTE — PROGRESS NOTES
[facility]    Physical Therapy  Cancellation/No-show Note  Patient Name:  Lizbeth Elizondo  :  1985   Date:  2022  Cancelled visits to date: 1  No-shows to date: 0    For today's appointment patient:  [x]  Cancelled  []  Rescheduled appointment  []  No-show     Reason given by patient:  []  Patient ill  []  Conflicting appointment  []  No transportation    []  Conflict with work  []  No reason given  [x]  Other: Pt does not have childcare    Comments:      Phone call information:   []  Phone call made today to patient at _ time at number provided:      []  Patient answered, conversation as follows:    []  Patient did not answer, message left as follows:  []  Phone call not made today  [x]  Phone call not needed - pt contacted us to cancel and provided reason for cancellation.      Electronically signed by:  Aliza Conrad PT, PT

## 2022-07-06 ENCOUNTER — PATIENT MESSAGE (OUTPATIENT)
Dept: FAMILY MEDICINE CLINIC | Age: 37
End: 2022-07-06

## 2022-07-07 NOTE — TELEPHONE ENCOUNTER
From: Joy Espinosa  To: Dr. Huang Sales: 2022 11:57 PM EDT  Subject: COVID    Hi, I tested positive for covid tonight. What can I do to help get through this? I had a fever yesterday and body aches, muscle pain, joint pain, headache. Sore throat started Monday. In addition to all of these a terrible cough started with creamy yellow mucus. I didnt know if there was anything special I can do? Im feeling pretty crummy. Thanks!

## 2022-07-11 NOTE — TELEPHONE ENCOUNTER
Spoke with Vasyl Melvin @ [phone] (M). Pt stated that she is having a cough (at times productive, green/yellow/red colored), fatigue, sore throat. She started with sx last Monday, 7/4 and tested positive on Wednesday, 7/6. She has been rotating advil and tylenol, had tried OTC cough medications (Mucinex, Tylenol cold and flu, NyQuil, Robitussin). Pharmacy: [facility] in [place]. Correct in chart.

## 2022-07-11 NOTE — TELEPHONE ENCOUNTER
Patient called back stating she tested positive for Covid she tried over the counter medication but it is not working for her.

## 2022-07-12 NOTE — TELEPHONE ENCOUNTER
Patient called back and states that all the OTC cough medication did not work, and is worried that she is getting bronchitis on top of Covid and is wanting to know if she would be able to get an anitbiotic, pt uses [facility] on [address], please call pt to advise @ [phone]

## 2022-07-12 NOTE — TELEPHONE ENCOUNTER
COVID-19 can actually cause bronchitis or pneumonia. Antibiotics do not work for Margarita because it is a cold virus. She can schedule a virtual visit with us so we can discuss symptomatic treatment if she would like something prescription called in and to make sure she is doing okay.

## 2022-07-13 NOTE — TELEPHONE ENCOUNTER
Spoke with pt and advised per note from Dr. Mckenna Kennedy. Pt declined the appt at this time and stated that she may leave the practice due to response time on this issue and she was unhappy. Pt stated that she called Monday for VV appt and she was just now hearing back about it. Pt stated that at this point she is starting to feel better. I apologized for the inconvenience and that I will let the  know. Pt verbalized understanding.

## 2022-07-13 NOTE — TELEPHONE ENCOUNTER
Spoke to patient and patient is aware that the provider has 24-48 hours to address any message that is sent back, pt just states that she really doesn't feel well and that it is frustrating that she has to wait 24-48 hours for a response. She is aware that it is office policy and that her provider was out of the office, and that the provider on call was still seeing patient, and will address messages between or after she sees her patient. I offered to get patient in for an appointment, but she only wanted to see her provider, let her know if we have a cancellation that I will call her and put her in for a VV . Pt stated that would be fine, or she will go to Urgent Care. Juju Stands

## 2022-07-22 ENCOUNTER — E-VISIT (OUTPATIENT)
Dept: PRIMARY CARE CLINIC | Age: 37
End: 2022-07-22
Payer: COMMERCIAL

## 2022-07-22 DIAGNOSIS — J01.90 ACUTE NON-RECURRENT SINUSITIS, UNSPECIFIED LOCATION: Primary | ICD-10-CM

## 2022-07-22 PROCEDURE — 99213 OFFICE O/P EST LOW 20 MIN: CPT | Performed by: NURSE PRACTITIONER

## 2022-07-22 RX ORDER — AZITHROMYCIN 250 MG/1
250 TABLET, FILM COATED ORAL SEE ADMIN INSTRUCTIONS
Qty: 6 TABLET | Refills: 0 | Status: SHIPPED | OUTPATIENT
Start: 2022-07-22 | End: 2022-07-27

## 2022-07-22 ASSESSMENT — LIFESTYLE VARIABLES
PACKS_PER_DAY: .25
SMOKING_YEARS: 5
SMOKING_STATUS: NO, I'M A FORMER SMOKER

## 2022-07-22 NOTE — PROGRESS NOTES
6.Gargle: (DAY ONE OF SYMPTOMS) Gargle in the back of the throat with the head tilted back and to the sides with a strong mouthwash  ( Listerine or Scope) after meals and at bedtime at least 4 -5 times a day. This helps kill bacteria and viruses in the back of the throat and will shorten the duration and decrease the severity of your symptoms: sore throat, cough, ear popping,/ear pain, and possibly dizziness. 7. Smoking: Avoid smoking or exposure to second hand smoke. 8. Zinc: (DAY ONE OF SYMPTOMS)  Zinc lozenges such as Cold Peng (available most stores), or Basic ([employer] brand) will help shorten the duration and lessen symptoms such as sore throat, cough, nasal congestion, runny nose, and post nasal drip. Use 1 lozenge every 2-4 hours ( after meals if stomach is sensitive). Children can use 10-15 mg or less 3-4 times a day or Zinc lollypops. In pregnancy limit to 50-60 mg a day for 7 days as prenatals have Zinc also. With diarrhea use zinc pills 50 mg 1/2 to 1 pill 2x/day. 9. Vitamins: Vitamin C 500 mg with breakfast and dinner. Children and pregnant women should drink citrus juices. This speeds healing and strengthens immune system. 10. Chest Symptoms: Vicks Vapor rub to the chest at bedtime. 11. Decongestants: Avoid all decongestants if you have high blood pressure. Safe to take if you do not have high blood pressure. Try all of the above starting with day 1 of symptoms. If Strep throat symptoms appear call to be seen in the office as soon as possible and don't gargle on that day. Newborns, infants, or anyone with earaches or influenza may need to be seen quickly. Adults with fevers over 103 degrees or shortness of breath should call the office immediately. 12. Nasal saline spray or rinse. There are many different ways to do this OTC.       Time spent 12 minutes

## 2022-08-17 DIAGNOSIS — E03.9 HYPOTHYROIDISM, UNSPECIFIED TYPE: ICD-10-CM

## 2022-08-17 RX ORDER — LEVOTHYROXINE SODIUM 0.07 MG/1
TABLET ORAL
Qty: 30 TABLET | Refills: 5 | OUTPATIENT
Start: 2022-08-17

## 2022-08-19 ENCOUNTER — OFFICE VISIT (OUTPATIENT)
Dept: FAMILY MEDICINE CLINIC | Age: 37
End: 2022-08-19
Payer: COMMERCIAL

## 2022-08-19 VITALS
OXYGEN SATURATION: 97 % | BODY MASS INDEX: 39.57 KG/M2 | DIASTOLIC BLOOD PRESSURE: 82 MMHG | TEMPERATURE: 96.5 F | SYSTOLIC BLOOD PRESSURE: 118 MMHG | WEIGHT: 223.4 LBS | HEART RATE: 69 BPM

## 2022-08-19 DIAGNOSIS — E66.01 CLASS 2 SEVERE OBESITY DUE TO EXCESS CALORIES WITH SERIOUS COMORBIDITY AND BODY MASS INDEX (BMI) OF 39.0 TO 39.9 IN ADULT (HCC): ICD-10-CM

## 2022-08-19 DIAGNOSIS — F41.8 MIXED ANXIETY AND DEPRESSIVE DISORDER: Primary | ICD-10-CM

## 2022-08-19 DIAGNOSIS — E03.9 HYPOTHYROIDISM, UNSPECIFIED TYPE: ICD-10-CM

## 2022-08-19 PROCEDURE — 99213 OFFICE O/P EST LOW 20 MIN: CPT | Performed by: FAMILY MEDICINE

## 2022-08-19 RX ORDER — POLYETHYLENE GLYCOL-3350 AND ELECTROLYTES 236; 6.74; 5.86; 2.97; 22.74 G/274.31G; G/274.31G; G/274.31G; G/274.31G; G/274.31G
POWDER, FOR SOLUTION ORAL
COMMUNITY
Start: 2022-06-03

## 2022-08-19 RX ORDER — LEVOTHYROXINE SODIUM 0.07 MG/1
75 TABLET ORAL DAILY
Qty: 90 TABLET | Refills: 1 | Status: SHIPPED | OUTPATIENT
Start: 2022-08-19

## 2022-08-19 RX ORDER — TRETINOIN 0.5 MG/G
CREAM TOPICAL
COMMUNITY
Start: 2022-02-24

## 2022-08-19 RX ORDER — CHLORDIAZEPOXIDE HYDROCHLORIDE AND CLIDINIUM BROMIDE 5; 2.5 MG/1; MG/1
CAPSULE ORAL
COMMUNITY
Start: 2022-06-02

## 2022-08-19 NOTE — PROGRESS NOTES
Flaco Burger   40 y.o. female   1985    HPI:    Patient was last seen by me on 1/31/2022. Reason(s) for visit:   Chief Complaint   Patient presents with    Medication Refill    Anxiety    Follow-up Chronic Condition    Hypothyroidism     Patient has a history of anxiety and depression that started sometime while she was in college. She has taken various medications such as citalopram, venlafaxine, and most recently sertraline. She was prescribed sertraline during her most recent pregnancy in November 2021. This was prescribed by her OB/GYN. She vocalized complete satisfaction on the current dose of sertraline 50 mg once daily and wanted to continue this. She denied any recent significant stressors. 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 She stated that she will be starting this new job in a couple weeks and will be able to work remotely from home. She stated that her income will increase by 60% and the benefits are even better compared to her former job. She stated that she is excited to start her new job and mention that she and her  have considered traveling around the country while she works. Since her last office visit, patient stated that she had an EGD and colonoscopy on 6/6/2022. The EGD was remarkable only for mild gastritis. Biopsies were negative for Marshall's esophagus and H. pylori. She also had a colonoscopy that was overall unremarkable and random biopsies showed no evidence of microscopic colitis. She was diagnosed with IBS with constipation diarrhea and GERD. She was advised to continue pantoprazole, famotidine, and Rolaids. She also has since taken a probiotic, which has helped her IBS. She currently denied issues with chronic nausea, vomiting, heartburn, dyspepsia, regurgitation, constipation, diarrhea, etc.  Her prior lab work-up includes tissue transglutaminase IgA being negative.     Patient reported that she has recently become physically active and has found a fitness community online and has hired a . She stated that she has been able to lose 5 pounds and 6 inches off her waistline over the past 3 weeks. She was very happy with results and would like to be at least below 200 pounds. She is currently around 220 pounds with a BMI of 39.5. Patient has a past medical history of hypothyroidism and her last thyroid function test was in 2022 and her TSH was 0.71 with free T4 of 1.3. Allergies   Allergen Reactions    Bactrim [Sulfamethoxazole-Trimethoprim]     Cephalexin     Macrobid [Nitrofurantoin Macrocrystal] Hives    Maxalt [Rizatriptan]        Current Outpatient Medications on File Prior to Visit   Medication Sig Dispense Refill    pantoprazole (PROTONIX) 40 MG tablet       SPRINTEC 28 0.25-35 MG-MCG per tablet       chlordiazePOXIDE-clidinium (LIBRAX) 5-2.5 MG per capsule  (Patient not taking: Reported on 2022)      GAVILYTE-G 236 g solution  (Patient not taking: Reported on 2022)      tretinoin (RETIN-A) 0.05 % cream Mix tiny amount with Vanicream and apply to affected areas in the PM. (Patient not taking: Reported on 2022)      Multiple Vitamins-Minerals (MULTIVITAMIN ADULTS PO) Take by mouth (Patient not taking: Reported on 2022)       No current facility-administered medications on file prior to visit.         Family History   Problem Relation Age of Onset    High Blood Pressure Mother     Depression Mother     Diabetes Mother     Anxiety Disorder Mother     High Blood Pressure Father     Breast Cancer Maternal Aunt     Cancer Maternal Aunt     Breast Cancer Paternal Grandmother        Social History     Tobacco Use    Smoking status: Former     Types: Cigarettes     Quit date: 2018     Years since quittin.6    Smokeless tobacco: Never   Substance Use Topics    Alcohol use: Yes     Comment: social        Lab Results   Component Value Date    WBC 7.0 02/18/2022    HGB 13.3 02/18/2022    HCT 39.6 02/18/2022    MCV 85.7 02/18/2022     02/18/2022         Chemistry        Component Value Date/Time     02/18/2022 1311    K 4.5 02/18/2022 1311    K 4.3 01/06/2021 0752     02/18/2022 1311    CO2 23 02/18/2022 1311    BUN 14 02/18/2022 1311    CREATININE 0.7 02/18/2022 1311        Component Value Date/Time    CALCIUM 9.7 02/18/2022 1311    ALKPHOS 131 (H) 02/18/2022 1311    AST 26 02/18/2022 1311    ALT 35 02/18/2022 1311    BILITOT 0.4 02/18/2022 1311          Lab Results   Component Value Date    ALT 35 02/18/2022    AST 26 02/18/2022    GGT 40 (H) 01/11/2021    ALKPHOS 131 (H) 02/18/2022    BILITOT 0.4 02/18/2022       Review of Systems   Constitutional:  Negative for activity change, appetite change, fatigue, fever and unexpected weight change. HENT:  Negative for congestion, rhinorrhea, sinus pressure and trouble swallowing. Respiratory:  Negative for cough, chest tightness, shortness of breath and wheezing. Cardiovascular:  Negative for chest pain, palpitations and leg swelling. Gastrointestinal:  Negative for abdominal distention, abdominal pain, blood in stool, constipation, diarrhea, nausea and vomiting. Genitourinary:  Negative for dysuria, frequency and hematuria. Musculoskeletal:  Negative for arthralgias and back pain. Skin:  Negative for rash. Neurological:  Negative for dizziness, weakness, light-headedness, numbness and headaches. Psychiatric/Behavioral:  Negative for agitation, decreased concentration, dysphoric mood and sleep disturbance. The patient is not nervous/anxious.          Wt Readings from Last 3 Encounters:   08/19/22 223 lb 6.4 oz (101.3 kg)   04/28/22 224 lb 9.6 oz (101.9 kg)   04/15/22 213 lb (96.6 kg)       BP Readings from Last 3 Encounters:   08/19/22 118/82   01/31/22 118/64   11/23/21 121/85       Pulse Readings from Last 3 Encounters:   08/19/22 69   01/31/22 78   11/23/21 61       /82 Pulse 69   Temp (!) 96.5 °F (35.8 °C)   Wt 223 lb 6.4 oz (101.3 kg)   LMP 08/17/2022   SpO2 97%   BMI 39.57 kg/m²      Physical Exam  Vitals reviewed. Constitutional:       General: She is awake. She is not in acute distress. Appearance: She is obese. She is not ill-appearing or diaphoretic. HENT:      Head: Normocephalic and atraumatic. No abrasion or masses. Hair is normal.      Right Ear: External ear normal.      Left Ear: External ear normal.      Nose: Nose normal.   Eyes:      General: Lids are normal. Gaze aligned appropriately. No scleral icterus. Right eye: No discharge. Left eye: No discharge. Extraocular Movements: Extraocular movements intact. Conjunctiva/sclera: Conjunctivae normal.   Neck:      Trachea: Phonation normal.   Cardiovascular:      Rate and Rhythm: Normal rate and regular rhythm. Pulmonary:      Effort: Pulmonary effort is normal. No respiratory distress. Breath sounds: No wheezing, rhonchi or rales. Abdominal:      General: Abdomen is flat. There is no distension. Palpations: Abdomen is soft. Musculoskeletal:         General: No deformity. Normal range of motion. Cervical back: Normal range of motion. No erythema. Right lower leg: No edema. Left lower leg: No edema. Skin:     Coloration: Skin is not cyanotic, jaundiced or pale. Findings: No abrasion, abscess, bruising, ecchymosis, erythema, signs of injury, laceration, lesion, petechiae, rash or wound. Neurological:      General: No focal deficit present. Mental Status: She is alert. Mental status is at baseline. GCS: GCS eye subscore is 4. GCS verbal subscore is 5. GCS motor subscore is 6. Cranial Nerves: No cranial nerve deficit, dysarthria or facial asymmetry. Motor: No weakness, tremor, atrophy or seizure activity. Coordination: Coordination normal.      Gait: Gait is intact.    Psychiatric:         Attention and Perception: Attention will then be updated with the corresponding side effect(s) if it's deemed to be a true 'drug allergy'. -The most common adverse effects of medication(s) were addressed at today's visit.    -Lastly, the coverage status of a medication may vary from insurance to insurance and the only way to verify if the medication is covered is to send an actual prescription in.    -The drug formulary of each insurance changes without any warning or notification to the healthcare provider let alone the pharmacy.  -The cost of medications vary from insurance to insurance and the cost is always subject to change just like the drug formulary. Follow-up: Return in about 6 months (around 2/19/2023) for annual physical..     Patient was informed that if his or her symptoms worsen to follow up with me sooner or go to the nearest ER if the symptoms are very significant and warrant higher level of care. Regarding my note:  -This note was composed (by me only and not with assistance via a scribe) to the best of my knowledge and recollection of the encounter with the patient using one of my own customized note templates utilizing a combination of typing and dictating with the [address] [place] [address] speech recognition software. As a result, the note may possibly contain various errors (e.g. spelling, grammar, and non-sensible words/phrases/statements) despite reviewing the note prior to signing it for completion. Time spent includes some or all of the following, both face-to-face time and non face-to-face time, but is not limited to:  [x] Preparing to see the patient by reviewing medical records available (notes, labs, imaging, etc.) prior to seeing the patient. [x] Obtaining and/or reviewing the history from the patient. [x] Performing a medically appropriate examination. [x] Ordering of relevant lab work, medications, referrals, or procedures.   [x] Discussing patient's medical issues and formulating an assessment and plan. [x] Reviewing plan of care with patient. Answering any questions or concerns. [x] Documentation within the electronic health record (EHR)  [] Reviewing records of history relevant to patient's issues after seeing the patient. [] Discussion or coordination of care with other health care professionals  [x] Other: length office visit - 20 minutes.  -I spent a significant amount of time discussing various issues as noted above and also with formulating a treatment plan for each specific issue. Patient was given the opportunity to ask me any questions and address any concerns/issues. I also reviewed lab work (if available) as well as prior notes from PCP and/or other specialists if available. Ricci Oquendo M.D.   [address] [place] [address]    Electronically signed by Aracely Ron MD M.D. on 8/20/2022 at 10:27 AM.

## 2022-08-20 ASSESSMENT — ENCOUNTER SYMPTOMS
WHEEZING: 0
DIARRHEA: 0
TROUBLE SWALLOWING: 0
ABDOMINAL DISTENTION: 0
VOMITING: 0
NAUSEA: 0
SINUS PRESSURE: 0
CONSTIPATION: 0
COUGH: 0
SHORTNESS OF BREATH: 0
BACK PAIN: 0
CHEST TIGHTNESS: 0
BLOOD IN STOOL: 0
RHINORRHEA: 0
ABDOMINAL PAIN: 0

## 2022-10-30 ENCOUNTER — E-VISIT (OUTPATIENT)
Dept: PRIMARY CARE CLINIC | Age: 37
End: 2022-10-30
Payer: COMMERCIAL

## 2022-10-30 DIAGNOSIS — R30.0 DYSURIA: Primary | ICD-10-CM

## 2022-10-30 PROCEDURE — 99422 OL DIG E/M SVC 11-20 MIN: CPT | Performed by: NURSE PRACTITIONER

## 2022-10-30 RX ORDER — CIPROFLOXACIN 500 MG/1
500 TABLET, FILM COATED ORAL 2 TIMES DAILY
Qty: 14 TABLET | Refills: 0 | Status: SHIPPED | OUTPATIENT
Start: 2022-10-30 | End: 2022-11-06

## 2023-02-23 DIAGNOSIS — E03.9 HYPOTHYROIDISM, UNSPECIFIED TYPE: ICD-10-CM

## 2023-02-23 RX ORDER — LEVOTHYROXINE SODIUM 0.07 MG/1
TABLET ORAL
Qty: 90 TABLET | Refills: 1 | OUTPATIENT
Start: 2023-02-23

## 2023-02-27 DIAGNOSIS — E03.9 HYPOTHYROIDISM, UNSPECIFIED TYPE: ICD-10-CM

## 2023-03-02 RX ORDER — LEVOTHYROXINE SODIUM 0.07 MG/1
75 TABLET ORAL DAILY
Qty: 90 TABLET | Refills: 1 | OUTPATIENT
Start: 2023-03-02

## 2023-03-12 NOTE — PROGRESS NOTES
Char PORTILLO Lodi   37 y.o. female   1985    HPI:    Patient was last seen by me on 8/19/2022.  During our last office visit:   -Continued current medical regiment.    Reason(s) for visit:   Chief Complaint   Patient presents with    Medication Refill    Discuss Labs    Headache     Off/on 2 months    Pharyngitis     On/ off 2 months    Dizziness     Random  maybe 1 x week    Fatigue     constant    Joint Pain     Hips/hands hurt in morning    Breast Pain     We discussed various health issues today.    Patient reported of bilateral breast pain (right greater than left) for the past month.  She reported her pain is sharp and reported tenderness of both breast and is persistent in nature.  She denied any issues of swelling, asymmetry, nipple inversion, nipple discharge of any kind, change in skin color/texture, itching, numbness, tingling, etc.  She has a family history of breast cancer as noted below.  She has never had a mammogram before.  She is open to the idea of having a mammogram done.  She has not done genetic testing before.    We also discussed various ongoing issues.  Patient specifically requested lab work-up after her own research -CBC, CMP, TSH, T4, T3, A1c, ESR, C-reactive protein, SHANTI, vitamin D, testosterone, estradiol, iron, etc.  Her symptoms in no order include: Migraines/headaches, intermittent issues of \"brain fog\", dizziness, fatigue, generalized joint pain, numbness of hands, low libido, weight gain, breast pain, axillary pain, sore throat.    According to patient, the patient stated that she had autoimmune work-up done some years ago.  Per chart review, she had autoimmune lab work done.  She had an SHANTI checked in October 2015 in November 2015.  Her SHANTI was positive with a reflex test being negative October, but negative following month.  Per patient she was told that she had a false positive SHANTI.  She also had ESR, CK, rheumatoid factor, hepatitis testing, HIV testing uric acid,  vitamin D, vitamin B12, folate and they were all within normal limits. Patient has past medical history significant for anxiety depression for which she has taken various medications such as citalopram, venlafaxine, and most recently sertraline. She was prescribed sertraline during her most recent pregnancy in November 2021. This was prescribed by her OB/GYN. She vocalized complete satisfaction on the current dose of sertraline 50 mg once daily and wanted to continue this. She denied any recent significant stressors. Patient has PMH of hypothyroidism and has overall been stable on her current dose. She has history of GERD. Of note she had an EGD and colonoscopy on 6/6/2022. The EGD was remarkable only for mild gastritis. Biopsies were negative for Marshall's esophagus and H. pylori. She also had a colonoscopy that was overall unremarkable and random biopsies showed no evidence of microscopic colitis. She was diagnosed with IBS with constipation diarrhea and GERD. She was advised to continue pantoprazole, famotidine, and Rolaids. She also has since taken a probiotic, which has helped her IBS. She currently denied issues with chronic nausea, vomiting, heartburn, dyspepsia, regurgitation, constipation, diarrhea, etc.  Her prior lab work-up includes tissue transglutaminase IgA being negative. Allergies   Allergen Reactions    Bactrim [Sulfamethoxazole-Trimethoprim]     Cephalexin     Macrobid [Nitrofurantoin Macrocrystal] Hives    Maxalt [Rizatriptan]        Current Outpatient Medications on File Prior to Visit   Medication Sig Dispense Refill    tretinoin (RETIN-A) 0.05 % cream       sertraline (ZOLOFT) 50 MG tablet Take 1 tablet by mouth daily 90 tablet 1    pantoprazole (PROTONIX) 40 MG tablet       Multiple Vitamins-Minerals (MULTIVITAMIN ADULTS PO) Take by mouth       No current facility-administered medications on file prior to visit.         Family History   Problem Relation Age of Onset High Blood Pressure Mother     Depression Mother     Diabetes Mother     Anxiety Disorder Mother     High Blood Pressure Father     Breast Cancer Maternal Aunt     Cancer Maternal Aunt     Breast Cancer Paternal Grandmother        Social History     Tobacco Use    Smoking status: Former     Types: Cigarettes     Quit date: 2018     Years since quittin.2    Smokeless tobacco: Never   Substance Use Topics    Alcohol use: Yes     Comment: social        Lab Results   Component Value Date    WBC 7.0 2022    HGB 13.3 2022    HCT 39.6 2022    MCV 85.7 2022     2022         Chemistry        Component Value Date/Time     2022 1311    K 4.5 2022 1311    K 4.3 2021 0752     2022 1311    CO2 23 2022 1311    BUN 14 2022 1311    CREATININE 0.7 2022 1311        Component Value Date/Time    CALCIUM 9.7 2022 1311    ALKPHOS 131 (H) 2022 1311    AST 26 2022 1311    ALT 35 2022 1311    BILITOT 0.4 2022 1311          Lab Results   Component Value Date    ALT 35 2022    AST 26 2022    GGT 40 (H) 2021    ALKPHOS 131 (H) 2022    BILITOT 0.4 2022       Lab Results   Component Value Date    CHOL 232 (H) 2022     Lab Results   Component Value Date    TRIG 221 (H) 2022     Lab Results   Component Value Date    HDL 49 2022     Lab Results   Component Value Date    LDLCALC 139 (H) 2022     Lab Results   Component Value Date    LABVLDL 44 2022     No results found for: CHOLHDLRATIO      Review of Systems   Constitutional:  Positive for activity change and fatigue. Negative for appetite change, fever and unexpected weight change. HENT:  Negative for congestion, rhinorrhea, sinus pressure and trouble swallowing. Respiratory:  Negative for cough, chest tightness, shortness of breath and wheezing.     Cardiovascular:  Negative for chest pain, palpitations and leg swelling. Gastrointestinal:  Negative for abdominal distention, abdominal pain, blood in stool, constipation, diarrhea, nausea and vomiting. Genitourinary:  Negative for dysuria, frequency and hematuria. Musculoskeletal:  Positive for arthralgias. Negative for back pain. Skin:  Negative for rash. Neurological:  Negative for dizziness, weakness, light-headedness, numbness and headaches. Psychiatric/Behavioral:  Negative for agitation, decreased concentration, dysphoric mood, sleep disturbance and suicidal ideas. The patient is not nervous/anxious. Wt Readings from Last 3 Encounters:   03/15/23 235 lb 12.8 oz (107 kg)   08/19/22 223 lb 6.4 oz (101.3 kg)   04/28/22 224 lb 9.6 oz (101.9 kg)       BP Readings from Last 3 Encounters:   03/15/23 110/74   08/19/22 118/82   01/31/22 118/64       Pulse Readings from Last 3 Encounters:   03/15/23 76   08/19/22 69   01/31/22 78       /74   Pulse 76   Temp 98.1 °F (36.7 °C)   Wt 235 lb 12.8 oz (107 kg)   LMP 02/23/2023   SpO2 98%   BMI 41.77 kg/m²      Physical Exam  Vitals reviewed. Constitutional:       General: She is awake. She is not in acute distress. Appearance: She is morbidly obese. She is not ill-appearing or diaphoretic. HENT:      Head: Normocephalic and atraumatic. No abrasion or masses. Hair is normal.      Right Ear: External ear normal.      Left Ear: External ear normal.      Nose: Nose normal.   Eyes:      General: Lids are normal. Gaze aligned appropriately. No scleral icterus. Right eye: No discharge. Left eye: No discharge. Extraocular Movements: Extraocular movements intact. Conjunctiva/sclera: Conjunctivae normal.   Neck:      Trachea: Phonation normal.   Cardiovascular:      Rate and Rhythm: Normal rate and regular rhythm. Pulmonary:      Effort: Pulmonary effort is normal. No respiratory distress. Breath sounds: No wheezing, rhonchi or rales.    Abdominal:      General: Abdomen is flat. There is no distension. Palpations: Abdomen is soft. Musculoskeletal:         General: No deformity. Normal range of motion. Cervical back: Normal range of motion. No erythema. Right lower leg: No edema. Left lower leg: No edema. Skin:     Coloration: Skin is not cyanotic, jaundiced or pale. Findings: No abrasion, abscess, bruising, ecchymosis, erythema, signs of injury, laceration, lesion, petechiae, rash or wound. Neurological:      General: No focal deficit present. Mental Status: She is alert. Mental status is at baseline. GCS: GCS eye subscore is 4. GCS verbal subscore is 5. GCS motor subscore is 6. Cranial Nerves: No cranial nerve deficit, dysarthria or facial asymmetry. Motor: No weakness, tremor, atrophy or seizure activity. Coordination: Coordination normal.      Gait: Gait is intact. Psychiatric:         Attention and Perception: Attention and perception normal.         Mood and Affect: Mood and affect normal.         Speech: Speech normal.         Behavior: Behavior normal. Behavior is cooperative. Thought Content: Thought content normal.        Assessment/Plan:   Annmarie Alejandro was seen today for medication refill, discuss labs, headache, pharyngitis, dizziness, fatigue, joint pain and breast pain. Diagnoses and all orders for this visit:    Chronic fatigue  Comments: Will do workup below. Orders:  -     C-Reactive Protein; Future  -     Sedimentation Rate; Future  -     Vitamin D 25 Hydroxy; Future  -     Vitamin B12 & Folate; Future  -     Ferritin; Future  -     Iron and TIBC; Future    Positive SHANTI (antinuclear antibody)  Comments: Will check CRP, ESR, vit D and other labs noted above first.    Mixed anxiety and depressive disorder  Comments:  Stable on Sertraline. She declined to make any changes to her medical regiment.     Breast pain in female  Comments:  Given her [facility] of breast CA and her current issue of breast discomofort, I will refer pt to [id number] specialists for evaluation. Orders:  -     [facility]    Family history of breast cancer  -     [facility] DIGITAL SCREEN W OR WO CAD BILATERAL; Future  -     [facility]    Hypothyroidism, unspecified type  Comments:  Stable. Orders:  -     TSH; Future  -     T4, Free; Future  -     T3, Free; Future  -     levothyroxine (SYNTHROID) 75 MCG tablet; Take 1 tablet by mouth Daily    I reviewed the plan of care with the patient. Patient acknowledged understanding and agreed with plan of care overall. Medications Discontinued During This Encounter   Medication Reason    chlordiazePOXIDE-clidinium (LIBRAX) 5-2.5 MG per capsule Therapy completed    GAVILYTE-G 236 g solution Therapy completed    SPRINTEC 28 0.25-35 MG-MCG per tablet Therapy completed    levothyroxine (SYNTHROID) 75 MCG tablet REORDER        General information on medications:  -When it comes to medications, whether with starting or adding a new medication or increasing the dose of a current medication, the benefits and risks have to always be considered and weighed over, especially if one is taking other medications as well. -There are no medications that have no side effects and that there is always a risk involved with taking a medication.    -If a side effect were to occur with starting a new medication or with increasing the dose of a current medication that either the medication can be totally discontinued altogether or simply decrease the dose of it and if this would be the case a follow-up appointment would be deemed necessary.    -The drug allergy list will then be updated with the corresponding side effect(s) if it's deemed to be a true 'drug allergy'.     -The most common adverse effects of medication(s) were addressed at today's visit.    -Lastly, the coverage status of a medication may vary from insurance to insurance and the only way to verify if the medication is covered is to send an actual prescription in.    -The drug formulary of each insurance changes without any warning or notification to the healthcare provider let alone the pharmacy.  -The cost of medications vary from insurance to insurance and the cost is always subject to change just like the drug formulary. Follow-up: Return if symptoms worsen or fail to improve. .     Patient was informed that if his or her symptoms worsen to follow up with me sooner or go to the nearest ER if the symptoms are very significant and warrant higher level of care. Regarding my note:  -This note was composed (by me only and not with assistance via a scribe) to the best of my knowledge and recollection of the encounter with the patient using one of my own customized note templates utilizing a combination of typing and dictating with the [address] [place] [address] speech recognition software. As a result, the note may possibly contain various errors (e.g. spelling, grammar, and non-sensible words/phrases/statements) despite reviewing the note prior to signing it for completion. Time spent includes some or all of the following, both face-to-face time and non face-to-face time, but is not limited to:  [x] Preparing to see the patient by reviewing medical records available (notes, labs, imaging, etc.) prior to seeing the patient. [x] Obtaining and/or reviewing the history from the patient. [x] Performing a medically appropriate examination. [x] Ordering of relevant lab work, medications, referrals, or procedures. [x] Discussing patient's medical issues and formulating an assessment and plan. [x] Reviewing plan of care with patient. Answering any questions or concerns. [x] Documentation within the electronic health record (EHR)  [] Reviewing records of history relevant to patient's issues after seeing the patient.   [] Discussion or coordination of care with other health care professionals. Ricci Ascencio M.D.   [address] [place] [address]    Electronically signed by Kimberly Lang MD M.D. on 3/16/2023 at 2:50 PM.

## 2023-03-15 ENCOUNTER — TELEPHONE (OUTPATIENT)
Dept: SURGERY | Age: 38
End: 2023-03-15

## 2023-03-15 ENCOUNTER — OFFICE VISIT (OUTPATIENT)
Dept: FAMILY MEDICINE CLINIC | Age: 38
End: 2023-03-15
Payer: COMMERCIAL

## 2023-03-15 VITALS
OXYGEN SATURATION: 98 % | WEIGHT: 235.8 LBS | TEMPERATURE: 98.1 F | HEART RATE: 76 BPM | SYSTOLIC BLOOD PRESSURE: 110 MMHG | BODY MASS INDEX: 41.77 KG/M2 | DIASTOLIC BLOOD PRESSURE: 74 MMHG

## 2023-03-15 DIAGNOSIS — E03.9 HYPOTHYROIDISM, UNSPECIFIED TYPE: ICD-10-CM

## 2023-03-15 DIAGNOSIS — Z80.3 FAMILY HISTORY OF BREAST CANCER: ICD-10-CM

## 2023-03-15 DIAGNOSIS — R53.82 CHRONIC FATIGUE: Primary | ICD-10-CM

## 2023-03-15 DIAGNOSIS — N64.4 BREAST PAIN IN FEMALE: ICD-10-CM

## 2023-03-15 DIAGNOSIS — R76.8 POSITIVE ANA (ANTINUCLEAR ANTIBODY): ICD-10-CM

## 2023-03-15 DIAGNOSIS — F41.8 MIXED ANXIETY AND DEPRESSIVE DISORDER: ICD-10-CM

## 2023-03-15 DIAGNOSIS — R53.82 CHRONIC FATIGUE: ICD-10-CM

## 2023-03-15 LAB
CRP SERPL-MCNC: 5.9 MG/L (ref 0–5.1)
ERYTHROCYTE [SEDIMENTATION RATE] IN BLOOD BY WESTERGREN METHOD: 12 MM/HR (ref 0–20)
FERRITIN SERPL IA-MCNC: 42.9 NG/ML (ref 15–150)
IRON SATN MFR SERPL: 30 % (ref 15–50)
IRON SERPL-MCNC: 90 UG/DL (ref 37–145)
T3FREE SERPL-MCNC: 2.5 PG/ML (ref 2.3–4.2)
T4 FREE SERPL-MCNC: 1.1 NG/DL (ref 0.9–1.8)
TIBC SERPL-MCNC: 303 UG/DL (ref 260–445)
TSH SERPL DL<=0.005 MIU/L-ACNC: 2.08 UIU/ML (ref 0.27–4.2)

## 2023-03-15 PROCEDURE — 99214 OFFICE O/P EST MOD 30 MIN: CPT | Performed by: FAMILY MEDICINE

## 2023-03-15 RX ORDER — LEVOTHYROXINE SODIUM 0.07 MG/1
75 TABLET ORAL DAILY
Qty: 30 TABLET | Refills: 2 | Status: SHIPPED | OUTPATIENT
Start: 2023-03-15

## 2023-03-15 ASSESSMENT — PATIENT HEALTH QUESTIONNAIRE - PHQ9
2. FEELING DOWN, DEPRESSED OR HOPELESS: 0
SUM OF ALL RESPONSES TO PHQ QUESTIONS 1-9: 11
9. THOUGHTS THAT YOU WOULD BE BETTER OFF DEAD, OR OF HURTING YOURSELF: 0
5. POOR APPETITE OR OVEREATING: 0
SUM OF ALL RESPONSES TO PHQ QUESTIONS 1-9: 11
7. TROUBLE CONCENTRATING ON THINGS, SUCH AS READING THE NEWSPAPER OR WATCHING TELEVISION: 3
3. TROUBLE FALLING OR STAYING ASLEEP: 2
4. FEELING TIRED OR HAVING LITTLE ENERGY: 3
6. FEELING BAD ABOUT YOURSELF - OR THAT YOU ARE A FAILURE OR HAVE LET YOURSELF OR YOUR FAMILY DOWN: 0
SUM OF ALL RESPONSES TO PHQ QUESTIONS 1-9: 11
10. IF YOU CHECKED OFF ANY PROBLEMS, HOW DIFFICULT HAVE THESE PROBLEMS MADE IT FOR YOU TO DO YOUR WORK, TAKE CARE OF THINGS AT HOME, OR GET ALONG WITH OTHER PEOPLE: 0
SUM OF ALL RESPONSES TO PHQ QUESTIONS 1-9: 11
8. MOVING OR SPEAKING SO SLOWLY THAT OTHER PEOPLE COULD HAVE NOTICED. OR THE OPPOSITE, BEING SO FIGETY OR RESTLESS THAT YOU HAVE BEEN MOVING AROUND A LOT MORE THAN USUAL: 3

## 2023-03-15 NOTE — TELEPHONE ENCOUNTER
Patient is a referral that I received. Reached out to patient to get her scheduled for first available. Offered patient 03/23/23 @9am in the [place] office with Tom Vinson/JERSON. Patient said that she couldn't make/ not willing to come to this appointment and needed the next available. I offered the patient the next available new patient appointment on 05/10/23 @1:30 in the  office. Patient said that she shouldn't let her breast pain issue go on for to long without getting it checked out yet was not willing to come in on 03/23/23.

## 2023-03-16 ENCOUNTER — TELEPHONE (OUTPATIENT)
Dept: SURGERY | Age: 38
End: 2023-03-16

## 2023-03-16 LAB
25(OH)D3 SERPL-MCNC: 24.7 NG/ML
FOLATE SERPL-MCNC: 9.89 NG/ML (ref 4.78–24.2)
VIT B12 SERPL-MCNC: 541 PG/ML (ref 211–911)

## 2023-03-16 ASSESSMENT — ENCOUNTER SYMPTOMS
DIARRHEA: 0
ABDOMINAL PAIN: 0
ABDOMINAL DISTENTION: 0
COUGH: 0
BLOOD IN STOOL: 0
CONSTIPATION: 0
VOMITING: 0
CHEST TIGHTNESS: 0
TROUBLE SWALLOWING: 0
SINUS PRESSURE: 0
BACK PAIN: 0
NAUSEA: 0
SHORTNESS OF BREATH: 0
RHINORRHEA: 0
WHEEZING: 0

## 2023-03-16 NOTE — TELEPHONE ENCOUNTER
Left VM for patient to return call to review intake and confirm appointment for 3/21/23 at 8:30 am in our [place] office. Also asked patient to arrive 15 minutes before appointment time if unavailable to return call.

## 2023-03-17 ENCOUNTER — TELEPHONE (OUTPATIENT)
Dept: WOMENS IMAGING | Age: 38
End: 2023-03-17

## 2023-03-17 DIAGNOSIS — F41.8 MIXED ANXIETY AND DEPRESSIVE DISORDER: ICD-10-CM

## 2023-03-20 NOTE — TELEPHONE ENCOUNTER
Spoke to patient and confirmed appointment for 3/21/23 at 8:30 am at our [place] office, also collected intake. Menstrual History:  Menarche age: 15  . Age first live birth: 36  Breastfeed: for about 1 month  Premenopausal    Oral contraceptive use: for about 18 years in total, stopped   Hormone replacement therapy use: denies    Breast density: Heterogeneously dense scattered fibroglandular density entirely fatty extremely dense   [employer] Heritage: no   Genetic testing: none     Family history significant for breast and ovarian cancer:   Paternal Grandmother Breast Cancer age of dx [de-identified]  Maternal Aunt Maximino Kevin) Breast Cancer age of dx late 52's  Maternal Uncle Prostate Cancer age of dx ? Maternal Grandfather Skin Cancer (Melanoma) age of dx ? Paternal Grandfather Leukemia age of dx ? Paternal Aunt Thyroid Cancer age of dx ?         Diet: regular  Alcohol: rare  Exercise: denies  Sleep: 7-8 hours    Cigarette smoking: non-smoker  Trauma to the breast, neck or shoulder: denies  Chronic neck/shoulder/back pain: denies   New medications: no   Recent changes to menstrual cycle or hormone therapy: denies  Is pain related to menstrual cycle: denies   Bra size: 42 DD  Implants: no  Supportive bra: yes no   Caffeine intake: coffee every am  Fried/fatty food: a few times a week

## 2023-03-21 ENCOUNTER — OFFICE VISIT (OUTPATIENT)
Dept: SURGERY | Age: 38
End: 2023-03-21
Payer: COMMERCIAL

## 2023-03-21 VITALS
WEIGHT: 237.8 LBS | OXYGEN SATURATION: 97 % | SYSTOLIC BLOOD PRESSURE: 118 MMHG | HEART RATE: 82 BPM | HEIGHT: 63 IN | BODY MASS INDEX: 42.13 KG/M2 | DIASTOLIC BLOOD PRESSURE: 72 MMHG | RESPIRATION RATE: 18 BRPM

## 2023-03-21 DIAGNOSIS — N64.4 BREAST PAIN: Primary | ICD-10-CM

## 2023-03-21 DIAGNOSIS — Z12.39 ENCOUNTER FOR SCREENING BREAST EXAMINATION: ICD-10-CM

## 2023-03-21 DIAGNOSIS — Z80.3 FAMILY HISTORY OF BREAST CANCER: ICD-10-CM

## 2023-03-21 DIAGNOSIS — Z91.89 AT HIGH RISK FOR BREAST CANCER: Primary | ICD-10-CM

## 2023-03-21 DIAGNOSIS — Z91.89 AT HIGH RISK FOR BREAST CANCER: ICD-10-CM

## 2023-03-21 PROCEDURE — 99204 OFFICE O/P NEW MOD 45 MIN: CPT | Performed by: NURSE PRACTITIONER

## 2023-03-21 NOTE — PATIENT INSTRUCTIONS
three levels of pressure to feel of all your breast tissue. Use light pressure to feel the tissue close to the skin surface. Use medium pressure to feel a little deeper. Use firm pressure to feel your tissue close to your breastbone and ribs. Use each pressure level to feel your breast tissue before moving on to the next spot. Check your entire breast, moving up and down as if following a strip from the collarbone to the bra line, and from the armpit to the ribs. Repeat until you have covered the entire breast.  Repeat this procedure for your left breast, using the pads of the 3 middle fingers of your right hand. To examine your breasts while in the shower:  Place one arm over your head and lightly soap your breast on that side. Using the pads of your fingers, gently move your hand over your breast (in the strip pattern described above), feeling carefully for any lumps or changes. Repeat for the other breast.  Have your doctor inspect anything you notice to see if you need further testing. Where can you learn more? Go to http://www.woods.com/ and enter P148 to learn more about \"Breast Self-Exam: Care Instructions. \"  Current as of: August 2, 2022               Content Version: 13.6  © 2006-2023 [employer]. Care instructions adapted under license by [facility] ([facility]). If you have questions about a medical condition or this instruction, always ask your healthcare professional. [place] [address] any warranty or liability for your use of this information.

## 2023-03-21 NOTE — PROGRESS NOTES
appear well perfused. Risk assessment using KEYANNA Breast Cancer Risk Evaluation Tool to evaluate her risk compared to the general population. Her lifetime risk for breast cancer is 25.8% (general population average 13.2%). ASSESSMENT:   - Breast pain - no concerning findings for breast pain on clinical exam today. Breast pain may be related to hormonal causes, refereed pain, costochondritis, and/or weight of breast tissue   - High Risk for Breast Cancer based on increased risk profile for breast cancer. Levi (KYEANNA) 8.0 risk estimate calculated at 25.8% (>20% is considered high risk). - Screening Breast Examination - stable breast examination. No concerning findings suggestive of malignancy at this time. - Family History of Breast Cancer       PLAN:    1. Surveillance: We discussed the NCCN guidelines for high risk surveillance. This includes annual screening mammography, annual screening MRI, and clinical breast exams every 6-12 months. We also stressed the importance of breast awareness. - Bilateral screening mammogram and clinical breast exam due: now   - Bilateral breast MRI and clinical breast exam due: 9/2023     2. Education provided for Healthy Lifestyle Recommendations: healthy diet (decrease consumption of red meat, increase fresh fruits and vegetables), decreased alcohol consumption, adequate sleep (goal 6-8 hours), routine exercise (goal 150 minutes/week or greater), weight control.  - resume exercise routine      3. Breast pain -   - Reassured patient there is no concern for malignancy at this time based on clinical exam.  Breast imaging to r/o underlying pathology has already been ordered.   - Discussed possible benefit from  vitamin E  - Hormonal breast pain: recommend to keep a journal for breast pain and dietary changes  - Recommend decreasing fried/fatty foods and and decreasing/eliminating caffeine   - Recommend wearing a supportive and well fitting bra   - Warm

## 2023-03-27 ENCOUNTER — HOSPITAL ENCOUNTER (OUTPATIENT)
Dept: WOMENS IMAGING | Age: 38
Discharge: HOME | End: 2023-03-27
Payer: COMMERCIAL

## 2023-03-27 ENCOUNTER — HOSPITAL ENCOUNTER (OUTPATIENT)
Dept: ULTRASOUND IMAGING | Age: 38
Discharge: HOME | End: 2023-03-27
Payer: COMMERCIAL

## 2023-03-27 DIAGNOSIS — Z80.3 FAMILY HISTORY OF BREAST CANCER: ICD-10-CM

## 2023-03-27 DIAGNOSIS — E03.9 HYPOTHYROIDISM, UNSPECIFIED TYPE: ICD-10-CM

## 2023-03-27 DIAGNOSIS — R76.8 POSITIVE ANA (ANTINUCLEAR ANTIBODY): ICD-10-CM

## 2023-03-27 DIAGNOSIS — R92.8 ABNORMAL MAMMOGRAM: ICD-10-CM

## 2023-03-27 DIAGNOSIS — N64.4 BREAST PAIN IN FEMALE: ICD-10-CM

## 2023-03-27 PROCEDURE — 76642 ULTRASOUND BREAST LIMITED: CPT

## 2023-03-27 PROCEDURE — G0279 TOMOSYNTHESIS, MAMMO: HCPCS

## 2023-04-06 ENCOUNTER — E-VISIT (OUTPATIENT)
Dept: FAMILY MEDICINE CLINIC | Age: 38
End: 2023-04-06

## 2023-04-06 DIAGNOSIS — J02.9 SORE THROAT: ICD-10-CM

## 2023-04-06 DIAGNOSIS — J35.8 TONSILLAR EXUDATE: Primary | ICD-10-CM

## 2023-04-06 RX ORDER — AZITHROMYCIN 250 MG/1
TABLET, FILM COATED ORAL
Qty: 1 PACKET | Refills: 0 | Status: SHIPPED | OUTPATIENT
Start: 2023-04-06 | End: 2023-04-16

## 2023-04-06 ASSESSMENT — LIFESTYLE VARIABLES
SMOKING_YEARS: 5
SMOKING_STATUS: NO, I'M A FORMER SMOKER
PACKS_PER_DAY: 0

## 2023-06-08 DIAGNOSIS — E03.9 HYPOTHYROIDISM, UNSPECIFIED TYPE: ICD-10-CM

## 2023-06-08 RX ORDER — LEVOTHYROXINE SODIUM 0.07 MG/1
75 TABLET ORAL DAILY
Qty: 30 TABLET | Refills: 2 | OUTPATIENT
Start: 2023-06-08

## 2023-06-22 DIAGNOSIS — E03.9 HYPOTHYROIDISM, UNSPECIFIED TYPE: ICD-10-CM

## 2023-06-22 RX ORDER — LEVOTHYROXINE SODIUM 0.07 MG/1
TABLET ORAL
Qty: 30 TABLET | Refills: 5 | Status: SHIPPED | OUTPATIENT
Start: 2023-06-22

## 2023-06-22 NOTE — TELEPHONE ENCOUNTER
Medication:   Requested Prescriptions     Pending Prescriptions Disp Refills    levothyroxine (SYNTHROID) 75 MCG tablet [Pharmacy Med Name: LEVOTHYROXINE 75 MCG TABLET] 30 tablet 2     Sig: TAKE ONE TABLET BY MOUTH DAILY       Last Filled:  5/16/2023    Patient Phone Number: [phone] (home)     Last appt: 3/15/2023   Next appt: Visit date not found    Last Thyroid:   Lab Results   Component Value Date/Time    TSH 2.08 03/15/2023 10:27 AM    FT3 2.5 03/15/2023 10:27 AM    T4FREE 1.1 03/15/2023 10:27 AM

## 2023-06-28 NOTE — FLOWSHEET NOTE
Problem: Occupational Therapy  Goal: Occupational Therapy Goal  Description: Goals to be met by: 7/5/23     Patient will increase functional independence with ADLs by performing:    UE Dressing with Modified [place]ine.  LE Dressing with Modified Devine.  Grooming while standing at sink with Modified Devine.  Supine to sit with Modified Devine.  Step transfer with Modified Devine  Toilet transfer to toilet with Modified Devine.  Upper extremity exercise program x15 reps per handout, with independence.  The patient will demo (I) in Pursed Lipped Breathing  Outcome: Ongoing, Progressing     The patient will benefit from OT to address the above goals.   [employer]    Physical Therapy Treatment Note/ Progress Report:     Date:  2022    Patient Name:  Tiffanie Romano    :  1985  MRN: [id number]  Medical/Treatment Diagnosis Information:  · Diagnosis: Sprain of right ankle (S93.401A)  · Treatment Diagnosis: Right ankle pain  Insurance/Certification information:  PT Insurance Information: [employer] - $3500 deductible, $0 co-pay, 100% co-insurance, PT visits based on MN - auth required after 5 visits  Physician Information:  Dr. Hector Johnson of care signed (Y/N):     Date of Patient follow up with Physician:      Progress Report: []  Yes  [x]  No     Functional Scale: FOTO = 47   Date: 22    Date Range for reporting period:  Beginnin22  Ending:      Progress report due (10 Rx/or 30 days whichever is less): 66     Recertification due (POC duration/ or 90 days whichever is less): 22    Visit # Insurance Allowable Auth Needed   4 MN - auth required after 5 visits [x]Yes    []No     Pain level: 0/10 walking in, 4-5/10 when going down the stairs. SUBJECTIVE: Patient report significant less use of the CAM boot with increased use of lace up brace. States no pain when walking but 4-5/10 pain with activities such as stairs. OBJECTIVE:    Observation: Patient with full ankle ROM. Discussed working completely out of the boot and transferring to laced brace. Also, discussed working on no brace while at home.     Test measurements:      RESTRICTIONS/PRECAUTIONS: grade 2 R lateral ankle sprain    Exercises/Interventions:     Therapeutic Ex  x35 min Resistance Sets/sec Reps Notes             Incline calf stretch  30\" 3       Ankle 4 way TB blue 2 10       Standing HR  2 10 W/ 1/2 foam roller   Leg press DL # 3 x12    Leg press SL HR 60# 3 x12    Leg press DL 85# 2 x12     Seated HR 50# 3 x12 W/ 1/2 foam roller   Lateral bandwalking orange 1 3 laps    Double banded DF/PF theraband purple and orange 2 x20 ea    Double banded CW/CCW theraband purple and orange 2 x10 ea    On the wall anterior tib lifts  1 x25                           Therapeutic Activities                                                                 Manual Intervention        Knee mobs/PROM       Tib/Fem Mobs       Patella Mobs       Ankle mobs    TC distraction   STM/IASTM    gastroc          NMR re-education  x8 min              Tandem stance Air-ex 20\" 3    SLS airex 30\" x5    Mini bosu lunge iso's  3\" 15    SLS w/ tennis ball tranfers ground 4 cones x5 Cone on half wall with tennis ball transfer                                   Therapeutic Exercise and NMR EXR  [x] (51151) Provided verbal/tactile cueing for activities related to strengthening, flexibility, endurance, ROM for improvements in LE, proximal hip, and core control with self care, mobility, lifting, ambulation.  [] (55843) Provided verbal/tactile cueing for activities related to improving balance, coordination, kinesthetic sense, posture, motor skill, proprioception  to assist with LE, proximal hip, and core control in self care, mobility, lifting, ambulation and eccentric single leg control.      NMR and Therapeutic Activities:    [x] (00732 or 04104) Provided verbal/tactile cueing for activities related to improving balance, coordination, kinesthetic sense, posture, motor skill, proprioception and motor activation to allow for proper function of core, proximal hip and LE with self care and ADLs  [] (16207) Gait Re-education- Provided training and instruction to the patient for proper LE, core and proximal hip recruitment and positioning and eccentric body weight control with ambulation re-education including up and down stairs     Home Exercise Program:    [x] (36985) Reviewed/Progressed HEP activities related to strengthening, flexibility, endurance, ROM of core, proximal hip and LE for functional self-care, mobility, lifting and ambulation/stair navigation   [x] (46059)Reviewed/Progressed HEP activities related to improving balance, coordination, kinesthetic sense, posture, motor skill, proprioception of core, proximal hip and LE for self care, mobility, lifting, and ambulation/stair navigation      Manual Treatments:  PROM / STM / Oscillations-Mobs:  G-I, II, III, IV (PA's, Inf., Post.)  [x] (28526) Provided manual therapy to mobilize LE, proximal hip and/or LS spine soft tissue/joints for the purpose of modulating pain, promoting relaxation,  increasing ROM, reducing/eliminating soft tissue swelling/inflammation/restriction, improving soft tissue extensibility and allowing for proper ROM for normal function with self care, mobility, lifting and ambulation. Modalities:  ice to go     Charges:  Timed Code Treatment Minutes: 43 min   Total Treatment Minutes: 43 min       [] EVAL (LOW) 07991 (typically 20 minutes face-to-face)  [] EVAL (MOD) 08484 (typically 30 minutes face-to-face)  [] EVAL (HIGH) 21545 (typically 45 minutes face-to-face)  [] RE-EVAL     [x] YN(98158) x  2   [] IONTO (42504)  [x] NMR (76192) x 1    [] VASO (45800)  [] Manual (27380) x      [] Other:  [] TA (32392)x     [] Mech Traction (81633)  [] ES(attended) (32953)     [] ES (un) (67538): If BWC Please Indicate Time In/Out and Total Minutes  CPT Code Time in Time out Total Min                                           GOALS:   Patient stated goal: \"To walk without pain and to get back to exercising\"  [x]? Progressing: []? Met: []? Not Met: []? Adjusted     Therapist goals for Patient:   Short Term Goals: To be achieved in: 2 weeks  1. Independent in HEP and progression per patient tolerance, in order to prevent re-injury. []? Progressing: [x]? Met: []? Not Met: []? Adjusted  2. Patient will have a decrease in pain to facilitate improvement in movement, function, and ADLs as indicated by Functional Deficits. []? Progressing: [x]? Met: []? Not Met: []?  Adjusted     Long Term Goals: To be achieved in: 6 weeks  1. Disability index score of 70 or greater for the FOTO to assist with reaching prior level of function. [x]? Progressing: []? Met: []? Not Met: []? Adjusted  2. Patient will demonstrate increased AROM to WNL for R ankle to allow for proper joint functioning as indicated by patients Functional Deficits. [x]? Progressing: []? Met: []? Not Met: []? Adjusted  3. Patient will demonstrate an increase in Strength to good proximal hip strength and control, within 5lb HHD in LE to allow for proper functional mobility as indicated by patients Functional Deficits. [x]? Progressing: []? Met: []? Not Met: []? Adjusted  4. Patient will return to walking for 30 minutes without increased symptoms or restriction. [x]? Progressing: []? Met: []? Not Met: []? Adjusted  5. Patient will be able to walk up and down 1 flight of steps without increased symptoms or restriction. [x]? Progressing: []? Met: []? Not Met: []? Adjusted            Progression Towards Functional goals:  [x] Patient is progressing as expected towards functional goals listed. [] Progression is slowed due to complexities listed. [] Progression has been slowed due to co-morbidities. [] Plan just implemented, too soon to assess goals progression  [] Other:       ASSESSMENT: Patient voices further improvements in ankle symptoms during walking and ADLs. She is still having difficulty with stairs or task which require single leg balance. Full ankle ROM is noted with normal TC and ST joint mobility. She is still noticeably swollen around the lateral malleoli and discussed continuation of icing. Discussed working completely out of the boot and transferring to laced brace while working on no brace while at home. Great tolerance to progression in thera-ex today. No complaints of increased pain.        Return to Play: (if applicable)   []  Stage 1: Intro to Strength   []  Stage 2: Return to Run and Strength   []  Stage 3: Return to Jump and Strength   []  Stage 4: Dynamic Strength and Agility   []  Stage 5: Sport Specific Training   []  Ready to Return to Play, Meets All Above Stages   []  Not Ready for Return to Sports   Comments:            Treatment/Activity Tolerance:  [x] Patient tolerated treatment well [] Patient limited by fatique  [] Patient limited by pain  [] Patient limited by other medical complications  [] Other:     Overall Progression Towards Functional goals/ Treatment Progress Update:  [x] Patient is progressing as expected towards functional goals listed. [] Progression is slowed due to complexities/Impairments listed. [] Progression has been slowed due to co-morbidities. [] Plan just implemented, too soon to assess goals progression <30days   [] Goals require adjustment due to lack of progress  [] Patient is not progressing as expected and requires additional follow up with physician  [] Other    Prognosis for POC: [x] Good [] Fair  [] Poor    Patient requires continued skilled intervention: [x] Yes  [] No        PLAN: Improve ankle strength, stability and proprioception as tolerated per pt. Pt to wean out of boot and into ankle brace per discussion today. Continue with PT 1x/week. [x] Continue per plan of care [] Alter current plan (see comments)  [] Plan of care initiated [] Hold pending MD visit [] Discharge    Electronically signed by: Jose Carlos Nair PT    Note: If patient does not return for scheduled/recommended follow up visits, this note will serve as a discharge from care along with the most recent update on progress.

## 2023-07-12 NOTE — PROGRESS NOTES
[facility] post-partum note    Post-partum Day #2, LTCS    Subjective:    Pt had pain yesterday and received oxycodone 5mg twice. Pain now controlled but nauseous now, received zofran this am.  Endorses mild swelling in feet, but states not nearly as severe as with her last birth. Endorses pain on right side of incision, incision healing well. Has passed flatus, has been ambulating regularly in room. Pt denies vomiting, leg pain/cramping. Baby is bottle feeding. Pt concerned about post-partum preeclampsia which she experienced with prior birth, but reassured that her BP has been normal.    Lochia: Normal, spotting    Objective:  Vitals:    11/21/21 0914 11/21/21 1713 11/22/21 0100 11/22/21 0900   BP: 125/60 117/84 (!) 114/59 127/81   Pulse: 74 67 70 82   Resp:  17 18 20   Temp:  97.8 °F (36.6 °C) 98.2 °F (36.8 °C) 98.2 °F (36.8 °C)   TempSrc:  Oral Oral Axillary   SpO2:       Weight:       Height:           Physical Examination:  Appears well  Uterus: Firm, NT. Incision site: healing well, pain on right side. Calves: NT    Labs:    Recent Labs     11/20/21  0850 11/21/21  0515   WBC 13.9* 15.6*   HGB 10.9* 8.9*   HCT 32.8* 26.5*    290     No results for input(s): NA, K, CL, CO2, BUN, CREATININE, CALCIUM, AST, ALT in the last 72 hours.     Invalid input(s): JACKELINE      Current Facility-Administered Medications:     ondansetron (ZOFRAN-ODT) disintegrating tablet 8 mg, 8 mg, SubLINGual, Once, Alana Aguayo MD    levothyroxine (SYNTHROID) tablet 75 mcg, 75 mcg, Oral, Daily, Freddy ROCA MD, 75 mcg at 11/22/21 0704    lactated ringers infusion, , IntraVENous, Continuous, Freddy ROCA MD, Last Rate: 125 mL/hr at 11/21/21 0530, Rate Verify at 11/21/21 0530    sodium chloride flush 0.9 % injection 10 mL, 10 mL, IntraVENous, 2 times per day, Naman Mcallister MD, 10 mL at 11/21/21 0909    sodium chloride flush 0.9 % injection 10 mL, 10 mL, IntraVENous, PRN, Bishnu Ott V MD    0.9 % sodium chloride infusion, 25 mL, IntraVENous, PRN, Alvira Landau, MD    carboprost (HEMABATE) injection 250 mcg, 250 mcg, IntraMUSCular, PRN, Alvira Landau, MD    methylergonovine (METHERGINE) injection 200 mcg, 200 mcg, IntraMUSCular, PRN, Alvira Landau, MD    miSOPROStol (CYTOTEC) tablet 800 mcg, 800 mcg, Rectal, PRN, Alvira Landau, MD    famotidine (PEPCID) tablet 20 mg, 20 mg, Oral, BID PRN, Avni ROCA MD, 20 mg at 11/21/21 1241    famotidine (PEPCID) injection 20 mg, 20 mg, IntraVENous, BID PRN, Alvira Landau, MD    rho(D) immune globulin (HYPERRHO S/D) injection 300 mcg, 300 mcg, IntraMUSCular, Once, Bishnu Wyman MD    ibuprofen (ADVIL;MOTRIN) tablet 800 mg, 800 mg, Oral, Q8H, Bishnu ROCA MD, 800 mg at 11/22/21 0509    oxyCODONE (ROXICODONE) immediate release tablet 5 mg, 5 mg, Oral, Q4H PRN, 5 mg at 11/22/21 0704 **OR** oxyCODONE (ROXICODONE) immediate release tablet 10 mg, 10 mg, Oral, Q4H PRN, Alvira Landau, MD    ondansetron (ZOFRAN) injection 4 mg, 4 mg, IntraVENous, Q6H PRN, Avni ROCA MD, 4 mg at 11/21/21 1008    ondansetron (ZOFRAN-ODT) disintegrating tablet 8 mg, 8 mg, Oral, Q8H PRN, Avni ROCA MD, 8 mg at 11/22/21 0859    diphenhydrAMINE (BENADRYL) injection 25 mg, 25 mg, IntraVENous, Q6H PRN, Alvira Landau, MD    simethicone (MYLICON) chewable tablet 80 mg, 80 mg, Oral, Q6H PRN, Avni ROCA MD, 80 mg at 11/21/21 1710    docusate sodium (COLACE) capsule 100 mg, 100 mg, Oral, BID, Avni ROCA MD, 100 mg at 11/21/21 2107    polyethylene glycol (GLYCOLAX) packet 17 g, 17 g, Oral, Daily, Ferora ROCA MD, 17 g at 11/21/21 0909    magnesium hydroxide (MILK OF MAGNESIA) 400 MG/5ML suspension 30 mL, 30 mL, Oral, Daily PRN, Alvira Landau, MD    bisacodyl (DULCOLAX) suppository 10 mg, 10 mg, Rectal, Daily PRN, Alvira Landau, MD    sennosides-docusate sodium (SENOKOT-S) 8.6-50 MG tablet 1 tablet, 1 tablet, Oral, Daily PRN, Leyla Correa MD    lansinoh lanolin ointment, , Topical, Q1H PRN, Leyla Correa MD    ferrous sulfate (IRON 325) tablet 325 mg, 325 mg, Oral, BID WC, Bishnu RCOA MD, 325 mg at 21 1710    measles, mumps & rubella vaccine (MMR) injection 0.5 mL, 0.5 mL, SubCUTAneous, Prior to discharge, Leyla Correa MD    Tetanus-Diphth-Acell Pertussis (BOOSTRIX) injection 0.5 mL, 0.5 mL, IntraMUSCular, Prior to discharge, Leyla Correa MD    sertraline (ZOLOFT) tablet 50 mg, 50 mg, Oral, Daily, Alex ROCA MD, 50 mg at 21 0909    acetaminophen (TYLENOL) tablet 1,000 mg, 1,000 mg, Oral, Q8H, Alex ROCA MD, 1,000 mg at 21 0103     Assessment/Plan:    28 yo  s/p LTCS, PPD #2, doing well post op.  Pt advised to continue ambulating regularly, counseled that some discomfort at incision site is normal.     Continue Postpartum care  Discharge likely tomorrow Statement Selected

## 2023-07-18 ENCOUNTER — TELEPHONE (OUTPATIENT)
Dept: FAMILY MEDICINE CLINIC | Age: 38
End: 2023-07-18

## 2023-07-18 DIAGNOSIS — F41.8 MIXED ANXIETY AND DEPRESSIVE DISORDER: ICD-10-CM

## 2023-07-24 ENCOUNTER — PATIENT MESSAGE (OUTPATIENT)
Dept: FAMILY MEDICINE CLINIC | Age: 38
End: 2023-07-24

## 2023-07-24 NOTE — TELEPHONE ENCOUNTER
From: Sierra Gar  To: Dr. Janae Pickett: 7/24/2023 7:34 AM EDT  Subject: Appointment Today    I will not be able to make my appointment today. Lawrence Trujillo been a death in the family and I cannot use your phone number to call because you are not open. I will have to reschedule for later this week or next.

## 2023-08-14 ENCOUNTER — E-VISIT (OUTPATIENT)
Dept: PRIMARY CARE CLINIC | Age: 38
End: 2023-08-14
Payer: COMMERCIAL

## 2023-08-14 DIAGNOSIS — J03.90 TONSILLITIS: Primary | ICD-10-CM

## 2023-08-14 PROCEDURE — 99422 OL DIG E/M SVC 11-20 MIN: CPT | Performed by: NURSE PRACTITIONER

## 2023-08-14 RX ORDER — AZITHROMYCIN 250 MG/1
TABLET, FILM COATED ORAL
Qty: 1 PACKET | Refills: 0 | Status: SHIPPED | OUTPATIENT
Start: 2023-08-14 | End: 2023-08-24

## 2023-08-14 ASSESSMENT — LIFESTYLE VARIABLES
PACKS_PER_DAY: 1
SMOKING_YEARS: 5
SMOKING_STATUS: NO, I'M A FORMER SMOKER

## 2023-08-14 NOTE — PROGRESS NOTES
Elisesoren Tucker (1985) initiated an asynchronous digital communication through [address] [place] [address]. HPI: per patient questionnaire     Exam: not applicable    Diagnoses and all orders for this visit:  Diagnoses and all orders for this visit:    Tonsillitis    Other orders  -     azithromycin (ZITHROMAX) 250 MG tablet; 2 tablets now then 1 daily until gone. Recently treated in April for similar symptoms. Patient is to change her toothbrush after 24 hours of being on antibiotic. General supportive care measures sent. Encourage follow-up with PCP as she may need a referral to ENT      Time: EV2 - 11-20 minutes were spent on the digital evaluation and management of this patient.  16 min     FEDE Singh - CNP Initial Size Of Lesion: 0.8

## 2023-08-28 ENCOUNTER — OFFICE VISIT (OUTPATIENT)
Dept: INTERNAL MEDICINE CLINIC | Age: 38
End: 2023-08-28
Payer: COMMERCIAL

## 2023-08-28 VITALS
OXYGEN SATURATION: 97 % | HEART RATE: 69 BPM | HEIGHT: 63 IN | WEIGHT: 242.2 LBS | BODY MASS INDEX: 42.91 KG/M2 | DIASTOLIC BLOOD PRESSURE: 78 MMHG | SYSTOLIC BLOOD PRESSURE: 110 MMHG

## 2023-08-28 DIAGNOSIS — F41.0 GENERALIZED ANXIETY DISORDER WITH PANIC ATTACKS: ICD-10-CM

## 2023-08-28 DIAGNOSIS — Z00.00 ANNUAL PHYSICAL EXAM: Primary | ICD-10-CM

## 2023-08-28 DIAGNOSIS — G47.33 OSA (OBSTRUCTIVE SLEEP APNEA): ICD-10-CM

## 2023-08-28 DIAGNOSIS — F40.243 FEAR OF FLYING: ICD-10-CM

## 2023-08-28 DIAGNOSIS — E03.8 SUBCLINICAL HYPOTHYROIDISM: ICD-10-CM

## 2023-08-28 DIAGNOSIS — F41.1 GENERALIZED ANXIETY DISORDER WITH PANIC ATTACKS: ICD-10-CM

## 2023-08-28 PROCEDURE — 99213 OFFICE O/P EST LOW 20 MIN: CPT | Performed by: INTERNAL MEDICINE

## 2023-08-28 PROCEDURE — 99395 PREV VISIT EST AGE 18-39: CPT | Performed by: INTERNAL MEDICINE

## 2023-08-28 RX ORDER — ALPRAZOLAM 0.5 MG/1
TABLET ORAL
Qty: 4 TABLET | Refills: 0 | Status: SHIPPED | OUTPATIENT
Start: 2023-08-28 | End: 2023-08-28

## 2023-08-28 SDOH — HEALTH STABILITY: PHYSICAL HEALTH: ON AVERAGE, HOW MANY MINUTES DO YOU ENGAGE IN EXERCISE AT THIS LEVEL?: 0 MIN

## 2023-08-28 SDOH — ECONOMIC STABILITY: FOOD INSECURITY: WITHIN THE PAST 12 MONTHS, THE FOOD YOU BOUGHT JUST DIDN'T LAST AND YOU DIDN'T HAVE MONEY TO GET MORE.: NEVER TRUE

## 2023-08-28 SDOH — ECONOMIC STABILITY: HOUSING INSECURITY
IN THE LAST 12 MONTHS, WAS THERE A TIME WHEN YOU DID NOT HAVE A STEADY PLACE TO SLEEP OR SLEPT IN A SHELTER (INCLUDING NOW)?: NO

## 2023-08-28 SDOH — ECONOMIC STABILITY: INCOME INSECURITY: HOW HARD IS IT FOR YOU TO PAY FOR THE VERY BASICS LIKE FOOD, HOUSING, MEDICAL CARE, AND HEATING?: NOT HARD AT ALL

## 2023-08-28 SDOH — ECONOMIC STABILITY: FOOD INSECURITY: WITHIN THE PAST 12 MONTHS, YOU WORRIED THAT YOUR FOOD WOULD RUN OUT BEFORE YOU GOT MONEY TO BUY MORE.: NEVER TRUE

## 2023-08-28 SDOH — HEALTH STABILITY: PHYSICAL HEALTH: ON AVERAGE, HOW MANY DAYS PER WEEK DO YOU ENGAGE IN MODERATE TO STRENUOUS EXERCISE (LIKE A BRISK WALK)?: 0 DAYS

## 2023-08-28 ASSESSMENT — ENCOUNTER SYMPTOMS
NAUSEA: 0
VOMITING: 0
CHEST TIGHTNESS: 0
CONSTIPATION: 0
WHEEZING: 0
ABDOMINAL PAIN: 0
SORE THROAT: 0
BACK PAIN: 0
COUGH: 0
COLOR CHANGE: 0
SHORTNESS OF BREATH: 0

## 2023-08-28 NOTE — ASSESSMENT & PLAN NOTE
has been on citalopram and venlafaxine in the past, Zoloft was started postpartum about 2 years ago and so far helped the most however recently her symptoms been worsening and she has been having panic attacks especially when about to fly out or take a plane trip. She is identifying work-related stress as another trigger for her symptoms, constantly worrying about her kids. Recommended referral to behavioral health for CBT and counseling, we will try an increased dose of Zoloft to 75 mg, if that still not helpful then will go to 100 mg.   Will reevaluate in 12 weeks or sooner if needed

## 2023-08-28 NOTE — PROGRESS NOTES
ASSESSMENT/PLAN:  1. Annual physical exam  Assessment & Plan:    age-related health maintenance and immunization recommendations reviewed and discussed with patient, encouraged to complete flu vaccine in early fall  Labs ordered, healthy diet and regular exercise recommendations made to patient, BMI recommendations discussed with patient  Patient up-to-date with GYN exam, menstrual cycles are regular  Pression screen is negative, mostly an anxiety  Orders:  -     CBC; Future  -     Comprehensive Metabolic Panel; Future  -     Hemoglobin A1C; Future  -     Lipid Panel; Future  -     TSH with Reflex to FT4; Future  2. BHAVANA (obstructive sleep apnea)  Assessment & Plan:    patient was diagnosed previously with mild sleep apnea but never placed on CPAP therapy, she has regained weight since, continues to have snoring and nonrestful sleep, advised will need updated sleep study to decide if CPAP therapy is indicated or not given her chronic fatigue. Referral placed  Orders:  -     Jair Vera MD, Sleep Medicine, [facility]  3. Generalized anxiety disorder with panic attacks  Assessment & Plan:    has been on citalopram and venlafaxine in the past, Zoloft was started postpartum about 2 years ago and so far helped the most however recently her symptoms been worsening and she has been having panic attacks especially when about to fly out or take a plane trip. She is identifying work-related stress as another trigger for her symptoms, constantly worrying about her kids. Recommended referral to behavioral health for CBT and counseling, we will try an increased dose of Zoloft to 75 mg, if that still not helpful then will go to 100 mg. Will reevaluate in 12 weeks or sooner if needed  Orders:  -     Ambulatory referral to Psychology  -     ALPRAZolam (XANAX) 0.5 MG tablet; Take one tablet before airplane trip, Disp-4 tablet, R-0Normal  4.  Fear of flying  Assessment & Plan:    hydroxyzine did not help in the

## 2023-08-28 NOTE — ASSESSMENT & PLAN NOTE
hydroxyzine did not help in the past, advised can do Xanax as needed prior to airplane trips since this is only related to the situation, she does understand this is not for long-term use and she is aware of sedative effects and habit-forming potential with chronic or repetitive use

## 2023-08-28 NOTE — ASSESSMENT & PLAN NOTE
patient was diagnosed previously with mild sleep apnea but never placed on CPAP therapy, she has regained weight since, continues to have snoring and nonrestful sleep, advised will need updated sleep study to decide if CPAP therapy is indicated or not given her chronic fatigue.   Referral placed

## 2023-08-28 NOTE — ASSESSMENT & PLAN NOTE
age-related health maintenance and immunization recommendations reviewed and discussed with patient, encouraged to complete flu vaccine in early fall  Labs ordered, healthy diet and regular exercise recommendations made to patient, BMI recommendations discussed with patient  Patient up-to-date with GYN exam, menstrual cycles are regular  Pression screen is negative, mostly an anxiety

## 2023-09-01 DIAGNOSIS — Z00.00 ANNUAL PHYSICAL EXAM: ICD-10-CM

## 2023-09-01 LAB
ALBUMIN SERPL-MCNC: 4.2 G/DL (ref 3.4–5)
ALBUMIN/GLOB SERPL: 2 {RATIO} (ref 1.1–2.2)
ALP SERPL-CCNC: 143 U/L (ref 40–129)
ALT SERPL-CCNC: 11 U/L (ref 10–40)
ANION GAP SERPL CALCULATED.3IONS-SCNC: 9 MMOL/L (ref 3–16)
AST SERPL-CCNC: 13 U/L (ref 15–37)
BILIRUB SERPL-MCNC: <0.2 MG/DL (ref 0–1)
BUN SERPL-MCNC: 14 MG/DL (ref 7–20)
CALCIUM SERPL-MCNC: 9 MG/DL (ref 8.3–10.6)
CHLORIDE SERPL-SCNC: 103 MMOL/L (ref 99–110)
CHOLEST SERPL-MCNC: 194 MG/DL (ref 0–199)
CO2 SERPL-SCNC: 26 MMOL/L (ref 21–32)
CREAT SERPL-MCNC: 0.7 MG/DL (ref 0.6–1.1)
DEPRECATED RDW RBC AUTO: 13.1 % (ref 12.4–15.4)
GFR SERPLBLD CREATININE-BSD FMLA CKD-EPI: >60 ML/MIN/{1.73_M2}
GLUCOSE SERPL-MCNC: 95 MG/DL (ref 70–99)
HCT VFR BLD AUTO: 37.5 % (ref 36–48)
HDLC SERPL-MCNC: 45 MG/DL (ref 40–60)
HGB BLD-MCNC: 12.4 G/DL (ref 12–16)
LDLC SERPL CALC-MCNC: 122 MG/DL
MCH RBC QN AUTO: 29.3 PG (ref 26–34)
MCHC RBC AUTO-ENTMCNC: 33.2 G/DL (ref 31–36)
MCV RBC AUTO: 88.1 FL (ref 80–100)
PLATELET # BLD AUTO: 361 K/UL (ref 135–450)
PMV BLD AUTO: 8.9 FL (ref 5–10.5)
POTASSIUM SERPL-SCNC: 4.3 MMOL/L (ref 3.5–5.1)
PROT SERPL-MCNC: 6.3 G/DL (ref 6.4–8.2)
RBC # BLD AUTO: 4.25 M/UL (ref 4–5.2)
SODIUM SERPL-SCNC: 138 MMOL/L (ref 136–145)
TRIGL SERPL-MCNC: 133 MG/DL (ref 0–150)
TSH SERPL DL<=0.005 MIU/L-ACNC: 1.76 UIU/ML (ref 0.27–4.2)
VLDLC SERPL CALC-MCNC: 27 MG/DL
WBC # BLD AUTO: 7.4 K/UL (ref 4–11)

## 2023-09-02 LAB
EST. AVERAGE GLUCOSE BLD GHB EST-MCNC: 108.3 MG/DL
HBA1C MFR BLD: 5.4 %

## 2023-09-18 ENCOUNTER — HOSPITAL ENCOUNTER (OUTPATIENT)
Dept: MRI IMAGING | Age: 38
Discharge: HOME | End: 2023-09-18
Payer: COMMERCIAL

## 2023-09-18 DIAGNOSIS — Z80.3 FAMILY HISTORY OF BREAST CANCER: ICD-10-CM

## 2023-09-18 DIAGNOSIS — Z91.89 AT HIGH RISK FOR BREAST CANCER: ICD-10-CM

## 2023-09-18 PROCEDURE — 6360000004 HC RX CONTRAST MEDICATION: Performed by: NURSE PRACTITIONER

## 2023-09-18 PROCEDURE — A9577 INJ MULTIHANCE: HCPCS | Performed by: NURSE PRACTITIONER

## 2023-09-18 PROCEDURE — C8908 MRI W/O FOL W/CONT, BREAST,: HCPCS

## 2023-09-18 RX ADMIN — GADOBENATE DIMEGLUMINE 19 ML: 529 INJECTION, SOLUTION INTRAVENOUS at 08:12

## 2023-09-27 PROBLEM — Z00.00 ANNUAL PHYSICAL EXAM: Status: RESOLVED | Noted: 2023-08-28 | Resolved: 2023-09-27

## 2023-10-02 ENCOUNTER — OFFICE VISIT (OUTPATIENT)
Dept: PSYCHOLOGY | Age: 38
End: 2023-10-02
Payer: COMMERCIAL

## 2023-10-02 ENCOUNTER — OFFICE VISIT (OUTPATIENT)
Dept: SURGERY | Age: 38
End: 2023-10-02
Payer: COMMERCIAL

## 2023-10-02 ENCOUNTER — OFFICE VISIT (OUTPATIENT)
Dept: INTERNAL MEDICINE CLINIC | Age: 38
End: 2023-10-02
Payer: COMMERCIAL

## 2023-10-02 VITALS
RESPIRATION RATE: 18 BRPM | HEART RATE: 68 BPM | OXYGEN SATURATION: 98 % | HEIGHT: 63 IN | BODY MASS INDEX: 43.09 KG/M2 | WEIGHT: 243.2 LBS

## 2023-10-02 VITALS
BODY MASS INDEX: 42.87 KG/M2 | HEART RATE: 77 BPM | SYSTOLIC BLOOD PRESSURE: 122 MMHG | OXYGEN SATURATION: 97 % | DIASTOLIC BLOOD PRESSURE: 86 MMHG | WEIGHT: 242 LBS

## 2023-10-02 DIAGNOSIS — Z12.39 ENCOUNTER FOR SCREENING BREAST EXAMINATION: ICD-10-CM

## 2023-10-02 DIAGNOSIS — G47.33 OSA (OBSTRUCTIVE SLEEP APNEA): ICD-10-CM

## 2023-10-02 DIAGNOSIS — R92.8 ABNORMAL FINDING ON BREAST IMAGING: Primary | ICD-10-CM

## 2023-10-02 DIAGNOSIS — F41.0 GENERALIZED ANXIETY DISORDER WITH PANIC ATTACKS: ICD-10-CM

## 2023-10-02 DIAGNOSIS — K82.9 GALL BLADDER DISEASE: ICD-10-CM

## 2023-10-02 DIAGNOSIS — Z91.89 AT HIGH RISK FOR BREAST CANCER: Primary | ICD-10-CM

## 2023-10-02 DIAGNOSIS — F40.243 FEAR OF FLYING: ICD-10-CM

## 2023-10-02 DIAGNOSIS — Z80.3 FAMILY HISTORY OF BREAST CANCER: ICD-10-CM

## 2023-10-02 DIAGNOSIS — Q24.8 PERICARDIAL CYST: Primary | ICD-10-CM

## 2023-10-02 DIAGNOSIS — Z91.89 AT HIGH RISK FOR BREAST CANCER: ICD-10-CM

## 2023-10-02 DIAGNOSIS — F41.1 GENERALIZED ANXIETY DISORDER WITH PANIC ATTACKS: ICD-10-CM

## 2023-10-02 DIAGNOSIS — F40.298 SPECIFIC PHOBIA: Primary | ICD-10-CM

## 2023-10-02 DIAGNOSIS — R92.8 ABNORMAL MRI, BREAST: ICD-10-CM

## 2023-10-02 PROCEDURE — 90791 PSYCH DIAGNOSTIC EVALUATION: CPT | Performed by: PSYCHOLOGIST

## 2023-10-02 PROCEDURE — 99214 OFFICE O/P EST MOD 30 MIN: CPT | Performed by: INTERNAL MEDICINE

## 2023-10-02 PROCEDURE — 99214 OFFICE O/P EST MOD 30 MIN: CPT | Performed by: NURSE PRACTITIONER

## 2023-10-02 RX ORDER — ALPRAZOLAM 0.5 MG/1
0.5 TABLET ORAL PRN
Qty: 2 TABLET | Refills: 0 | Status: SHIPPED | OUTPATIENT
Start: 2023-10-02 | End: 2023-10-04

## 2023-10-02 RX ORDER — ALPRAZOLAM 0.5 MG/1
0.5 TABLET ORAL PRN
COMMUNITY
Start: 2023-08-28 | End: 2023-10-02 | Stop reason: SDUPTHER

## 2023-10-02 ASSESSMENT — ANXIETY QUESTIONNAIRES
GAD7 TOTAL SCORE: 14
7. FEELING AFRAID AS IF SOMETHING AWFUL MIGHT HAPPEN: 3
2. NOT BEING ABLE TO STOP OR CONTROL WORRYING: 2
4. TROUBLE RELAXING: 2
1. FEELING NERVOUS, ANXIOUS, OR ON EDGE: 2
5. BEING SO RESTLESS THAT IT IS HARD TO SIT STILL: 1
6. BECOMING EASILY ANNOYED OR IRRITABLE: 2
IF YOU CHECKED OFF ANY PROBLEMS ON THIS QUESTIONNAIRE, HOW DIFFICULT HAVE THESE PROBLEMS MADE IT FOR YOU TO DO YOUR WORK, TAKE CARE OF THINGS AT HOME, OR GET ALONG WITH OTHER PEOPLE: VERY DIFFICULT
3. WORRYING TOO MUCH ABOUT DIFFERENT THINGS: 2

## 2023-10-02 ASSESSMENT — PATIENT HEALTH QUESTIONNAIRE - PHQ9
5. POOR APPETITE OR OVEREATING: 3
SUM OF ALL RESPONSES TO PHQ QUESTIONS 1-9: 9
7. TROUBLE CONCENTRATING ON THINGS, SUCH AS READING THE NEWSPAPER OR WATCHING TELEVISION: 0
1. LITTLE INTEREST OR PLEASURE IN DOING THINGS: 0
9. THOUGHTS THAT YOU WOULD BE BETTER OFF DEAD, OR OF HURTING YOURSELF: 0
SUM OF ALL RESPONSES TO PHQ QUESTIONS 1-9: 9
4. FEELING TIRED OR HAVING LITTLE ENERGY: 3
2. FEELING DOWN, DEPRESSED OR HOPELESS: 0
8. MOVING OR SPEAKING SO SLOWLY THAT OTHER PEOPLE COULD HAVE NOTICED. OR THE OPPOSITE, BEING SO FIGETY OR RESTLESS THAT YOU HAVE BEEN MOVING AROUND A LOT MORE THAN USUAL: 0
SUM OF ALL RESPONSES TO PHQ QUESTIONS 1-9: 9
3. TROUBLE FALLING OR STAYING ASLEEP: 3
10. IF YOU CHECKED OFF ANY PROBLEMS, HOW DIFFICULT HAVE THESE PROBLEMS MADE IT FOR YOU TO DO YOUR WORK, TAKE CARE OF THINGS AT HOME, OR GET ALONG WITH OTHER PEOPLE: 1
SUM OF ALL RESPONSES TO PHQ9 QUESTIONS 1 & 2: 0
6. FEELING BAD ABOUT YOURSELF - OR THAT YOU ARE A FAILURE OR HAVE LET YOURSELF OR YOUR FAMILY DOWN: 0
SUM OF ALL RESPONSES TO PHQ QUESTIONS 1-9: 9

## 2023-10-02 ASSESSMENT — ENCOUNTER SYMPTOMS
WHEEZING: 0
COUGH: 0
CHEST TIGHTNESS: 0
SORE THROAT: 0
BACK PAIN: 0
COLOR CHANGE: 0
CONSTIPATION: 0
SHORTNESS OF BREATH: 0
ABDOMINAL PAIN: 1
VOMITING: 0
NAUSEA: 0

## 2023-10-02 NOTE — PROGRESS NOTES
Bilateral diagnostic  mammogram due: now   - 6m short interval follow up Bilateral breast MRI and clinical breast exam due: 4/2024     2. Education provided for Healthy Lifestyle Recommendations: healthy diet (decrease consumption of red meat, increase fresh fruits and vegetables), decreased alcohol consumption, adequate sleep (goal 6-8 hours), routine exercise (goal 150 minutes/week or greater), weight control. 4. At prior visit, discussed recommendation for her sister to undergo formal high risk evaluation for breast cancer, given family history of breast cancer. Briefly mentioned, increase surveillance with mammogram and MRI screening. FEDE Patel-CNP  [facility])   Surgical Breast Oncology   [phone]    All of the patient's questions were answered at this time however, she was encouraged to call the office with any further inquiries. Approximately 30 minutes of time were spent in preparation, direct patient contact, counseling, care coordination, documentation and activities otherwise related to this encounter.

## 2023-10-02 NOTE — ASSESSMENT & PLAN NOTE
ultrasound done in 2019 showed thickening of the walls of gallbladder, patient reporting symptoms specially after eating heavy meals most recent last week, advised we will update ultrasound for the gallbladder and make recommendations on referral to surgery accordingly however encouraged to maintain healthy low-fat diet and continue attempts for weight loss.   No evidence of acute disease at the present

## 2023-10-02 NOTE — PROGRESS NOTES
ASSESSMENT/PLAN:  1. Pericardial cyst  Assessment & Plan:    incidental finding on MRI however growing size compared to 2020 study. Patient remains asymptomatic, advised will refer to cardiology for further evaluation and recommendation if excision is warranted or not  Orders:  -     Spencer Fair MD, Cardiology, [facility]  2. Gall bladder disease  Assessment & Plan:    ultrasound done in 2019 showed thickening of the walls of gallbladder, patient reporting symptoms specially after eating heavy meals most recent last week, advised we will update ultrasound for the gallbladder and make recommendations on referral to surgery accordingly however encouraged to maintain healthy low-fat diet and continue attempts for weight loss. No evidence of acute disease at the present  Orders:  -     [address]; Future  3. Generalized anxiety disorder with panic attacks  Assessment & Plan:    continue Zoloft and CBT/counseling with behavioral health, follow-up as recommended end of November  Orders:  -     ALPRAZolam (XANAX) 0.5 MG tablet; Take 1 tablet by mouth as needed for Anxiety for up to 2 days. Once a day as needed prior to flying, Disp-2 tablet, R-0Normal  4. Fear of flying  -     ALPRAZolam (XANAX) 0.5 MG tablet; Take 1 tablet by mouth as needed for Anxiety for up to 2 days. Once a day as needed prior to flying, Disp-2 tablet, R-0Normal  5. BHAVANA (obstructive sleep apnea)  Assessment & Plan:    scheduled to see sleep study in the near future, encouraged to continue attempts for weight loss      Return for as scheduled. SUBJECTIVE  HPI:   Patient here to follow-up on incidental findings on MRI of the breasts, also following up on her anxiety and has concerns about abdominal discomfort related to gallbladder disease        Review of Systems   Constitutional:  Negative for activity change, appetite change and fatigue. HENT:  Negative for congestion, hearing loss, mouth sores and sore throat.

## 2023-10-02 NOTE — PROGRESS NOTES
Behavioral Health Consultation  Rachel Verma, Ph.D.  Clinical Psychologist  10/2/2023  8:08 AM      Time spent with Patient: 28 minutes  This is patient's first [facility] appointment. Reason for Consult:    Chief Complaint   Patient presents with    Anxiety       Pt provided informed consent for the behavioral health program. Discussed with patient model of service to include the limits of confidentiality (i.e. abuse reporting, suicide intervention, etc.) and short-term intervention focused approach. Pt indicated understanding. Feedback given to PCP. S:  Pt seen per PCP re: anxiety, panic, phobia. Pt reported sxs of panic attack, including heart palpitations, trembling, SOB, chest pain, nausea, chill, paresthesias, fear of losing control, fear of dying. Sxs begin w GI sxs, trembling, hyperventilating, gagging, crying; 1 time didn't could not go to airport. Even xanax only dulled her anxiety. Is supposed to go to [place] next week. Tells herself repeatedly that she is safe and will be fine. Pt lives with  and 2 kids (1 and 5yo). Anxiety worsened considerably after having children. 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 Focused on adaptive coping for upcoming trip and coordinating w PCP and psychiatry on medication. EtOH: rarely, socially, may have 2 an at an event  Nicotine: none  Recreational Use: none  Caffeine: 2 c of coffee/day, maybe 1 coke later.      O:  MSE:    Appearance    alert, cooperative  Impulsive behavior Yes  Speech    spontaneous, normal rate, normal volume, and well articulated  Mood    Anxious  Affect    normal affect  Thought Content    intact  Thought Process    linear, goal directed, and coherent  Associations    logical connections  Insight    Good  Judgment    Intact  Orientation    oriented to person, place, time, and general circumstances  Memory    recent and remote memory

## 2023-10-02 NOTE — ASSESSMENT & PLAN NOTE
incidental finding on MRI however growing size compared to 2020 study.   Patient remains asymptomatic, advised will refer to cardiology for further evaluation and recommendation if excision is warranted or not

## 2023-10-17 ENCOUNTER — HOSPITAL ENCOUNTER (OUTPATIENT)
Dept: ULTRASOUND IMAGING | Age: 38
Discharge: HOME | End: 2023-10-17
Payer: COMMERCIAL

## 2023-10-17 DIAGNOSIS — K82.9 GALL BLADDER DISEASE: ICD-10-CM

## 2023-10-17 PROCEDURE — 76705 ECHO EXAM OF ABDOMEN: CPT

## 2023-10-20 ENCOUNTER — OFFICE VISIT (OUTPATIENT)
Dept: PULMONOLOGY | Age: 38
End: 2023-10-20

## 2023-10-20 VITALS
DIASTOLIC BLOOD PRESSURE: 80 MMHG | WEIGHT: 240 LBS | TEMPERATURE: 98 F | HEART RATE: 55 BPM | OXYGEN SATURATION: 95 % | BODY MASS INDEX: 42.52 KG/M2 | RESPIRATION RATE: 16 BRPM | SYSTOLIC BLOOD PRESSURE: 134 MMHG | HEIGHT: 63 IN

## 2023-10-20 DIAGNOSIS — R06.83 SNORING: ICD-10-CM

## 2023-10-20 DIAGNOSIS — E66.01 MORBID OBESITY WITH BMI OF 40.0-44.9, ADULT (HCC): ICD-10-CM

## 2023-10-20 DIAGNOSIS — G47.00 INSOMNIA, UNSPECIFIED TYPE: ICD-10-CM

## 2023-10-20 DIAGNOSIS — G47.10 HYPERSOMNIA: Primary | ICD-10-CM

## 2023-10-20 ASSESSMENT — SLEEP AND FATIGUE QUESTIONNAIRES
HOW LIKELY ARE YOU TO NOD OFF OR FALL ASLEEP WHILE SITTING AND TALKING TO SOMEONE: 0
HOW LIKELY ARE YOU TO NOD OFF OR FALL ASLEEP WHILE LYING DOWN TO REST IN THE AFTERNOON WHEN CIRCUMSTANCES PERMIT: 2
HOW LIKELY ARE YOU TO NOD OFF OR FALL ASLEEP WHILE WATCHING TV: 1
NECK CIRCUMFERENCE (INCHES): 14.5
HOW LIKELY ARE YOU TO NOD OFF OR FALL ASLEEP WHEN YOU ARE A PASSENGER IN A CAR FOR AN HOUR WITHOUT A BREAK: 1
HOW LIKELY ARE YOU TO NOD OFF OR FALL ASLEEP WHILE SITTING INACTIVE IN A PUBLIC PLACE: 1
HOW LIKELY ARE YOU TO NOD OFF OR FALL ASLEEP WHILE SITTING QUIETLY AFTER LUNCH WITHOUT ALCOHOL: 1
ESS TOTAL SCORE: 7
HOW LIKELY ARE YOU TO NOD OFF OR FALL ASLEEP IN A CAR, WHILE STOPPED FOR A FEW MINUTES IN TRAFFIC: 0
HOW LIKELY ARE YOU TO NOD OFF OR FALL ASLEEP WHILE SITTING AND READING: 1

## 2023-10-20 NOTE — PROGRESS NOTES
[facility]  Sleep Medicine  [address], [address] [facility], [address] [facility]    Chief Complaint   Patient presents with    Snoring     And daytime fatigue       Nohemi Cho is a 41 y.o. female who comes in for sleep evaluation. Her complaints include  poor sleep and loud snoring . Onset of symptoms was a few years ago. Pertinent PMHx includes: hypothyroidism, GERD, anxiety, severe obesity. She goes to bed at by around 9:30pm. She falls asleep in  at least 1 hours. She awakens at least 1 times per night (sometimes 2-3 times). She awakens at 6:30am on weekdays, 7:30-8am on weekends. She does not use sleep aid/s but he has taken medications like Unisom, tylenol PM in the past, but not regularly. She does take daytime naps sometimes. She describes the symptoms as daytime sleepiness, fatigue, loud snoring, disrupted sleep, difficulty falling asleep, difficulty staying asleep, restless sleep, non refreshed sleep , and difficulty with waking up with the alarm. She does not have symptoms that fulfill the criteria for restless legs syndrome. She has not dozed off while driving. She also reports recent significant weight gain of about 40 lbs in the past 5 years. Previous evaluation and treatment has included: none.     Family hx of sleep disorders:  mother with BHAVANA  Caffeinated drinks/day: 1 cup of coffee in the AM, occasionally soda  Alcohol intake/day/week: rare  Occupation:        DATA REVIEWED TODAY:  Dudley & Insomnia Severity Index scores      10/20/2023     9:05 AM   Sleep Medicine   Sitting and reading 1   Watching TV 1   Sitting, inactive in a public place (e.g. a theatre or a meeting) 1   As a passenger in a car for an hour without a break 1   Lying down to rest in the afternoon when circumstances permit 2   Sitting and talking to someone 0   Sitting quietly after a lunch without alcohol 1   In a car, while stopped for a few minutes in traffic 0

## 2023-10-20 NOTE — PATIENT INSTRUCTIONS
Patient information on the evaluation procedure for sleep apnea:    1. You are going to have a portable sleep study: This is a home sleep study that will be done to see if you have obstructive sleep apnea. You will have a flow monitor on your nose, belt on your chest and a oxygen/heart rate monitor on your finger. Please do not wear nail polish on day of the study as it will interfere with the oxygen reading probe that is placed on your finger during the study. Once you receive the device, you will also receive instructions on how to put it on and turn it on. After 2 nights you will return it. 2. The sleep office will call you with the results a week after the study. If the study showed that you have obstructive sleep apnea you will be told of the next step in your care. Commonly the recommended treatment is CPAP Therapy. If you agree to this therapy and you receive your CPAP before your next appointment, please bring it with you to your first follow-up appointment. Patients who have obstructive sleep apnea on the sleep study and have chosen to try CPAP:  A home equipment company will contact you to set you up with a CPAP machine and fit you for a mask. Please bring your CPAP, the mask and all supplies including the electrical cord with you to all your first follow up appointments with the sleep physician    Please keep trying to use your CPAP until you have seen in the sleep office in follow up as a lot of the problems patients have with CPAP can be addressed and corrected by your sleep provider. Sleep center contact information:  [facility] Sleep [facility]: [phone]  [facility]: [phone]             What are the risk factors for Obstructive Sleep Apnea (BHAVANA)? Obesity  Snoring  Daytime sleepiness  Increasing age  Male gender  Taking sedating medications  Alcohol use  Hypertension  Stroke  Diabetes  Smoking     What is BHAVANA?   People with BHAVANA experience recurrent

## 2023-10-27 ENCOUNTER — TELEPHONE (OUTPATIENT)
Dept: INTERNAL MEDICINE CLINIC | Age: 38
End: 2023-10-27

## 2023-10-27 DIAGNOSIS — R92.8 ABNORMAL MAMMOGRAM: Primary | ICD-10-CM

## 2023-10-27 NOTE — TELEPHONE ENCOUNTER
[facility] calling---pt has appt Monday needs order put in for Diagnostic Bilateral Mammogram ---please place in epic. Thanks.

## 2023-10-30 ENCOUNTER — OFFICE VISIT (OUTPATIENT)
Dept: INTERNAL MEDICINE CLINIC | Age: 38
End: 2023-10-30
Payer: COMMERCIAL

## 2023-10-30 VITALS
HEART RATE: 104 BPM | WEIGHT: 240 LBS | BODY MASS INDEX: 42.52 KG/M2 | TEMPERATURE: 100.2 F | SYSTOLIC BLOOD PRESSURE: 120 MMHG | DIASTOLIC BLOOD PRESSURE: 92 MMHG | OXYGEN SATURATION: 94 %

## 2023-10-30 DIAGNOSIS — J06.9 URTI (ACUTE UPPER RESPIRATORY INFECTION): Primary | ICD-10-CM

## 2023-10-30 DIAGNOSIS — J06.9 URTI (ACUTE UPPER RESPIRATORY INFECTION): ICD-10-CM

## 2023-10-30 LAB
INFLUENZA A ANTIGEN, POC: NEGATIVE
INFLUENZA B ANTIGEN, POC: NEGATIVE

## 2023-10-30 PROCEDURE — 99213 OFFICE O/P EST LOW 20 MIN: CPT | Performed by: INTERNAL MEDICINE

## 2023-10-30 PROCEDURE — 87804 INFLUENZA ASSAY W/OPTIC: CPT | Performed by: INTERNAL MEDICINE

## 2023-10-30 RX ORDER — IBUPROFEN 800 MG/1
800 TABLET ORAL EVERY 8 HOURS PRN
Qty: 30 TABLET | Refills: 0 | Status: SHIPPED | OUTPATIENT
Start: 2023-10-30

## 2023-10-30 RX ORDER — ALBUTEROL SULFATE 90 UG/1
2 AEROSOL, METERED RESPIRATORY (INHALATION) 4 TIMES DAILY PRN
Qty: 18 G | Refills: 0 | Status: SHIPPED | OUTPATIENT
Start: 2023-10-30

## 2023-10-30 ASSESSMENT — ENCOUNTER SYMPTOMS
COUGH: 1
SHORTNESS OF BREATH: 0
SINUS PRESSURE: 1
WHEEZING: 1
CHEST TIGHTNESS: 0

## 2023-10-30 NOTE — ASSESSMENT & PLAN NOTE
POC rapid flu antigen done in office is negative, advised patient she will need to complete COVID-19 and RSV antigen testing, advised increase water intake, alternate Tylenol and ibuprofen for fevers and aches, use over-the-counter antitussive, bedrest, as needed albuterol for chest congestion. Educated about alarming signs and symptoms of respiratory compromise and advised to call office or seek immediate medical attention if any.

## 2023-10-31 LAB
RSV AG NOSE QL: POSITIVE
SARS-COV-2 RNA RESP QL NAA+PROBE: NOT DETECTED

## 2023-11-02 ENCOUNTER — PATIENT MESSAGE (OUTPATIENT)
Dept: INTERNAL MEDICINE CLINIC | Age: 38
End: 2023-11-02

## 2023-11-02 DIAGNOSIS — H66.90 OTITIS, UNSPECIFIED LATERALITY: Primary | ICD-10-CM

## 2023-11-03 RX ORDER — DOXYCYCLINE HYCLATE 100 MG
100 TABLET ORAL 2 TIMES DAILY
Qty: 14 TABLET | Refills: 0 | Status: SHIPPED | OUTPATIENT
Start: 2023-11-03 | End: 2023-11-10

## 2023-11-03 NOTE — TELEPHONE ENCOUNTER
From: [employer]  To: Dr. Arlene Diaz: 11/2/2023 5:05 PM EDT  Subject: Ear Infection     I think my RSV has caused an ear infection. I am having difficulty hearing. discharge running out of the ear. pressure inside the ear. irritation in and around the ear. Can you call me in an antibiotic?

## 2023-11-09 ENCOUNTER — HOSPITAL ENCOUNTER (OUTPATIENT)
Dept: SLEEP CENTER | Age: 38
Discharge: HOME | End: 2023-11-09
Payer: COMMERCIAL

## 2023-11-09 DIAGNOSIS — R06.83 SNORING: ICD-10-CM

## 2023-11-09 DIAGNOSIS — G47.00 INSOMNIA, UNSPECIFIED TYPE: ICD-10-CM

## 2023-11-09 DIAGNOSIS — E66.01 MORBID OBESITY WITH BMI OF 40.0-44.9, ADULT (HCC): ICD-10-CM

## 2023-11-09 DIAGNOSIS — G47.10 HYPERSOMNIA: ICD-10-CM

## 2023-11-09 PROCEDURE — 95806 SLEEP STUDY UNATT&RESP EFFT: CPT

## 2023-11-14 ENCOUNTER — TELEPHONE (OUTPATIENT)
Dept: PULMONOLOGY | Age: 38
End: 2023-11-14

## 2023-11-14 NOTE — TELEPHONE ENCOUNTER
Please inform patient that her sleep study showed moderate sleep apnea and that she stopped breathing or her breathing was inadequate about 19 times for every hour of sleep. Her blood oxygen also dropped as low as 81% during the night. If she agrees I will order a CPAP via a durable medical equipment company of their choice (if no preference, we will go with [employer]) and they will reach out to keep her updated on the process. This will be the company that is going to work with her insurance and provide the CPAP device, along with replacing the mask and other supplies going forward. If they have any questions, they can let you know or message me via [employer]. If patient agrees with plan, please route the PAP order to DME company (order already completed on a separate order encounter). Patient should be scheduled for 31 to 90 days follow up.     Thanks  Gaby Taylor MD

## 2023-11-14 NOTE — PROGRESS NOTES
Diagnosis: [x] BHAVANA  (G47.33) [] CSA (G47.31) []  Other:__________________   Length of Need: [] 13 months [x]  99 Months                                          []  Other:__________________   Machine (CHELSY): [] Respironics Auto (with modem for remote monitoring)       [] Other:____________________    [x]  ResMed Auto (with modem for remote monitoring)    [x]  CPAP () [] Bilevel ()   Mode: Mode:   [x] Auto [] Fixed [] Auto [] Spontaneous   Pmin:_____5____cmH2O      Pmax:_____15____cmH2O   P:_________cmH2O    EPAPmin:__________cmH2O IPAP:__________cmH2O     IPAPmax:__________cmH2O EPAP:__________cmH2O     PSmin:_______  PSmax:_______       (ResMed) PS:_________     Flex/EPR - 3 full time                          Ramp time: 30 min Flex/EPR - 3 full time                 Ramp time: 30 min   Ramp Pressure:_____4______cmH2O Ramp Pressure:____________cmH2O         Humidifier: [x] Heated ()                                               [] Passive     [x] Water chamber replacement ()/ 1 per 6 months   Mask:   [x] Nasal () /1 per 3 months [x] Full Face () /1 per 3 months   [x] Patient choice -Size and fit mask [x] Patient Choice - Size and fit mask   [] Dispense:  [] Dispense:    [x] Headgear () / 1 per 3 months [x] Headgear () / 1 per 3 months   [x] Replacement Nasal Cushion ()/2 per month [x] Interface Replacement ()/1 per month   [x] Replacement Nasal Pillows ()/2 per month       Tubing: [x] Heated ()/1 per 3 months                           [] Standard ()/1 per 3 months  [] Other:____________________   Filters: [x] Non-disposable ()/1 per 6 months                 [x] Ultra-Fine, Disposable ()/2 per month     Miscellaneous: [] Chin Strap ()/ 1 per 6months      [] Other:__________________________________         Start Order Date: 11/14/23    MEDICAL JUSTIFICATION:  I, the undersigned, certify that the above prescribed supplies are medically

## 2023-11-18 ENCOUNTER — E-VISIT (OUTPATIENT)
Dept: PRIMARY CARE CLINIC | Age: 38
End: 2023-11-18

## 2023-11-18 DIAGNOSIS — J06.9 UPPER RESPIRATORY TRACT INFECTION, UNSPECIFIED TYPE: Primary | ICD-10-CM

## 2023-11-18 PROBLEM — G47.10 HYPERSOMNIA: Status: ACTIVE | Noted: 2023-11-18

## 2023-11-18 RX ORDER — METHYLPREDNISOLONE 4 MG/1
TABLET ORAL
Qty: 1 KIT | Refills: 0 | Status: SHIPPED | OUTPATIENT
Start: 2023-11-18 | End: 2023-11-24

## 2023-11-18 ASSESSMENT — LIFESTYLE VARIABLES
SMOKING_YEARS: 5
SMOKING_STATUS: NO, I'M A FORMER SMOKER
PACKS_PER_DAY: .5

## 2023-11-20 ENCOUNTER — HOSPITAL ENCOUNTER (OUTPATIENT)
Dept: WOMENS IMAGING | Age: 38
Discharge: HOME | End: 2023-11-20
Payer: COMMERCIAL

## 2023-11-20 DIAGNOSIS — Z80.3 FAMILY HISTORY OF BREAST CANCER: ICD-10-CM

## 2023-11-20 PROCEDURE — G0279 TOMOSYNTHESIS, MAMMO: HCPCS

## 2023-11-21 DIAGNOSIS — R92.8 ABNORMAL FINDING ON BREAST IMAGING: Primary | ICD-10-CM

## 2023-11-28 ENCOUNTER — OFFICE VISIT (OUTPATIENT)
Dept: CARDIOLOGY CLINIC | Age: 38
End: 2023-11-28
Payer: COMMERCIAL

## 2023-11-28 VITALS
HEART RATE: 89 BPM | DIASTOLIC BLOOD PRESSURE: 80 MMHG | HEIGHT: 63 IN | WEIGHT: 237 LBS | SYSTOLIC BLOOD PRESSURE: 116 MMHG | BODY MASS INDEX: 41.99 KG/M2 | OXYGEN SATURATION: 98 %

## 2023-11-28 DIAGNOSIS — Q24.8 PERICARDIAL CYST: Primary | ICD-10-CM

## 2023-11-28 PROCEDURE — 99203 OFFICE O/P NEW LOW 30 MIN: CPT | Performed by: INTERNAL MEDICINE

## 2023-11-28 PROCEDURE — 93000 ELECTROCARDIOGRAM COMPLETE: CPT | Performed by: INTERNAL MEDICINE

## 2023-11-28 NOTE — PROGRESS NOTES
[facility]  Cardiac Consult     Referring Provider:  Mariano Lagunas MD     Chief Complaint   Patient presents with    New Patient        History of Present Illness:   41 y.o. female seen in consultation at the request of Dr. Crouch July for a pericardial cyst.    She underwent CTPA in  for chest pain. No PE but pericardial cyst seen, 1.5 x 2.6 cm. She recently had a breast MRI that also showed the cyst, but it measured 4.1 cm. Repeat CT recommended. She is fairly active. No chest pain, shortness of breath or edema. Past Medical History:   has a past medical history of Abnormal Pap smear of cervix, Anxiety disorder, Depression, GERD (gastroesophageal reflux disease), Headache(784.0), Hypertension, Hypothyroid, and Obesity. Surgical History:   has a past surgical history that includes knee surgery; Mouth surgery; Bladder surgery;  section (N/A, 2021); and Knee Arthroplasty. Social History:  Social History     Tobacco Use    Smoking status: Former     Packs/day: 0.25     Years: 5.00     Additional pack years: 0.00     Total pack years: 1.25     Types: Cigarettes     Start date:      Quit date: 2018     Years since quittin.9    Smokeless tobacco: Never   Substance Use Topics    Alcohol use: Not Currently     Comment: Drink socially, maybe once a month. Family History:  family history includes Anxiety Disorder in her mother; Breast Cancer in her maternal aunt and paternal grandmother; Cancer in her maternal aunt, paternal grandfather, and paternal grandmother; Depression in her mother; Diabetes in her mother; High Blood Pressure in her father and mother; High Cholesterol in her father and mother; Obesity in her mother. Allergies:  Bactrim [sulfamethoxazole-trimethoprim], Cephalexin, Macrobid [nitrofurantoin macrocrystal], and Maxalt [rizatriptan]     Home Medications:  Prior to Visit Medications    Medication Sig Taking?  Authorizing Provider   sertraline

## 2023-11-29 ENCOUNTER — OFFICE VISIT (OUTPATIENT)
Dept: INTERNAL MEDICINE CLINIC | Age: 38
End: 2023-11-29
Payer: COMMERCIAL

## 2023-11-29 VITALS
HEART RATE: 91 BPM | SYSTOLIC BLOOD PRESSURE: 122 MMHG | DIASTOLIC BLOOD PRESSURE: 86 MMHG | WEIGHT: 239 LBS | OXYGEN SATURATION: 98 % | BODY MASS INDEX: 42.35 KG/M2

## 2023-11-29 DIAGNOSIS — F41.1 GENERALIZED ANXIETY DISORDER WITH PANIC ATTACKS: Primary | ICD-10-CM

## 2023-11-29 DIAGNOSIS — G47.10 HYPERSOMNIA: ICD-10-CM

## 2023-11-29 DIAGNOSIS — E03.9 HYPOTHYROIDISM, UNSPECIFIED TYPE: ICD-10-CM

## 2023-11-29 DIAGNOSIS — F41.0 GENERALIZED ANXIETY DISORDER WITH PANIC ATTACKS: Primary | ICD-10-CM

## 2023-11-29 DIAGNOSIS — R05.3 PERSISTENT COUGH: ICD-10-CM

## 2023-11-29 DIAGNOSIS — G47.33 OSA (OBSTRUCTIVE SLEEP APNEA): ICD-10-CM

## 2023-11-29 PROBLEM — J06.9 URTI (ACUTE UPPER RESPIRATORY INFECTION): Status: RESOLVED | Noted: 2023-10-30 | Resolved: 2023-11-29

## 2023-11-29 PROCEDURE — 99214 OFFICE O/P EST MOD 30 MIN: CPT | Performed by: INTERNAL MEDICINE

## 2023-11-29 RX ORDER — SERTRALINE HYDROCHLORIDE 100 MG/1
100 TABLET, FILM COATED ORAL DAILY
Qty: 90 TABLET | Refills: 1 | Status: SHIPPED | OUTPATIENT
Start: 2023-11-29

## 2023-11-29 RX ORDER — LEVOTHYROXINE SODIUM 0.07 MG/1
75 TABLET ORAL DAILY
Qty: 90 TABLET | Refills: 1 | Status: SHIPPED | OUTPATIENT
Start: 2023-11-29

## 2023-11-29 ASSESSMENT — ENCOUNTER SYMPTOMS
NAUSEA: 0
CHEST TIGHTNESS: 0
WHEEZING: 0
CONSTIPATION: 0
VOMITING: 0
ABDOMINAL PAIN: 0
COUGH: 0
COLOR CHANGE: 0
SHORTNESS OF BREATH: 0
BACK PAIN: 0
SORE THROAT: 0

## 2023-11-29 ASSESSMENT — ANXIETY QUESTIONNAIRES
GAD7 TOTAL SCORE: 12
7. FEELING AFRAID AS IF SOMETHING AWFUL MIGHT HAPPEN: 1
2. NOT BEING ABLE TO STOP OR CONTROL WORRYING: 2
1. FEELING NERVOUS, ANXIOUS, OR ON EDGE: 1
5. BEING SO RESTLESS THAT IT IS HARD TO SIT STILL: 1
IF YOU CHECKED OFF ANY PROBLEMS ON THIS QUESTIONNAIRE, HOW DIFFICULT HAVE THESE PROBLEMS MADE IT FOR YOU TO DO YOUR WORK, TAKE CARE OF THINGS AT HOME, OR GET ALONG WITH OTHER PEOPLE: SOMEWHAT DIFFICULT
4. TROUBLE RELAXING: 1
6. BECOMING EASILY ANNOYED OR IRRITABLE: 3
3. WORRYING TOO MUCH ABOUT DIFFERENT THINGS: 3

## 2023-11-29 NOTE — PROGRESS NOTES
ASSESSMENT/PLAN:  1. Generalized anxiety disorder with panic attacks  Assessment & Plan:    no specific stressors however feels symptoms suboptimal with Zoloft 75 mg, will increase dose to 100 as previously planned, advised may notice even more improvement once CPAP therapy is started and sustained, will reevaluate in 3 months or sooner if needed  Orders:  -     sertraline (ZOLOFT) 100 MG tablet; Take 1 tablet by mouth daily, Disp-90 tablet, R-1Normal  2. Hypothyroidism, unspecified type  Comments:  Stable. Assessment & Plan:    labs at target goal, will refill current dose of levothyroxine and repeat labs next visit. Orders:  -     levothyroxine (SYNTHROID) 75 MCG tablet; Take 1 tablet by mouth daily, Disp-90 tablet, R-1Normal  3. BHAVANA (obstructive sleep apnea)  Assessment & Plan:    completed sleep study, symptoms suggestive of moderate sleep apnea, will await CPAP trial and further recommendation by sleep medicine, reinforced recommendations to maintain healthy diet and attempt weight loss  4. Hypersomnia  5. Persistent cough  Assessment & Plan:    exam within normal findings, reassured residual cough from recent RSV infection none alarming at this point as long as no new or worsening symptoms, encouraged to ensure adequate hydration and use as needed cough drops and over-the-counter antitussive. Return in about 3 months (around 2/29/2024). SUBJECTIVE  HPI:   Here to f/u on depression and anxiety, would like to try higher dose of Zoloft because feels the current dose has plateaued, she continues to follow-up with behavioral health.           10/2/2023     8:06 AM 3/15/2023     8:58 AM 1/31/2022     9:21 AM   PHQ-9    Little interest or pleasure in doing things 0  0   Feeling down, depressed, or hopeless 0 0 0   Trouble falling or staying asleep, or sleeping too much 3 2    Feeling tired or having little energy 3 3    Poor appetite or overeating 3 0    Feeling bad about yourself - or that you are a

## 2023-11-29 NOTE — TELEPHONE ENCOUNTER
I contacted patient to discuss about sleep test result showing moderate sleep apnea. She  is agreeable to proceed with PAP therapy. An order for auto CPAP will be sent to [facility]. Patient needs a 31-90 days office follow-up visit.     Spencer Byrd MD

## 2023-11-29 NOTE — ASSESSMENT & PLAN NOTE
no specific stressors however feels symptoms suboptimal with Zoloft 75 mg, will increase dose to 100 as previously planned, advised may notice even more improvement once CPAP therapy is started and sustained, will reevaluate in 3 months or sooner if needed

## 2023-11-29 NOTE — ASSESSMENT & PLAN NOTE
exam within normal findings, reassured residual cough from recent RSV infection none alarming at this point as long as no new or worsening symptoms, encouraged to ensure adequate hydration and use as needed cough drops and over-the-counter antitussive.

## 2023-11-29 NOTE — TELEPHONE ENCOUNTER
Pt called back to review SS results. Nobody available at the time to review results. Pt stated that a good time to review results would be between 12-1 today or after 1:30.      HARIS.[phone]

## 2023-11-29 NOTE — ASSESSMENT & PLAN NOTE
completed sleep study, symptoms suggestive of moderate sleep apnea, will await CPAP trial and further recommendation by sleep medicine, reinforced recommendations to maintain healthy diet and attempt weight loss

## 2023-12-14 ENCOUNTER — OFFICE VISIT (OUTPATIENT)
Dept: PSYCHOLOGY | Age: 38
End: 2023-12-14
Payer: COMMERCIAL

## 2023-12-14 DIAGNOSIS — F40.298 SPECIFIC PHOBIA: Primary | ICD-10-CM

## 2023-12-14 PROCEDURE — 90832 PSYTX W PT 30 MINUTES: CPT | Performed by: PSYCHOLOGIST

## 2023-12-14 ASSESSMENT — ANXIETY QUESTIONNAIRES
7. FEELING AFRAID AS IF SOMETHING AWFUL MIGHT HAPPEN: 1
5. BEING SO RESTLESS THAT IT IS HARD TO SIT STILL: 1
GAD7 TOTAL SCORE: 13
1. FEELING NERVOUS, ANXIOUS, OR ON EDGE: 2
2. NOT BEING ABLE TO STOP OR CONTROL WORRYING: 2
6. BECOMING EASILY ANNOYED OR IRRITABLE: 3
4. TROUBLE RELAXING: 2
3. WORRYING TOO MUCH ABOUT DIFFERENT THINGS: 2
IF YOU CHECKED OFF ANY PROBLEMS ON THIS QUESTIONNAIRE, HOW DIFFICULT HAVE THESE PROBLEMS MADE IT FOR YOU TO DO YOUR WORK, TAKE CARE OF THINGS AT HOME, OR GET ALONG WITH OTHER PEOPLE: SOMEWHAT DIFFICULT

## 2023-12-14 ASSESSMENT — PATIENT HEALTH QUESTIONNAIRE - PHQ9
8. MOVING OR SPEAKING SO SLOWLY THAT OTHER PEOPLE COULD HAVE NOTICED. OR THE OPPOSITE, BEING SO FIGETY OR RESTLESS THAT YOU HAVE BEEN MOVING AROUND A LOT MORE THAN USUAL: 0
4. FEELING TIRED OR HAVING LITTLE ENERGY: 2
SUM OF ALL RESPONSES TO PHQ QUESTIONS 1-9: 9
5. POOR APPETITE OR OVEREATING: 3
3. TROUBLE FALLING OR STAYING ASLEEP: 2
1. LITTLE INTEREST OR PLEASURE IN DOING THINGS: 1
SUM OF ALL RESPONSES TO PHQ QUESTIONS 1-9: 9
2. FEELING DOWN, DEPRESSED OR HOPELESS: 0
SUM OF ALL RESPONSES TO PHQ QUESTIONS 1-9: 9
10. IF YOU CHECKED OFF ANY PROBLEMS, HOW DIFFICULT HAVE THESE PROBLEMS MADE IT FOR YOU TO DO YOUR WORK, TAKE CARE OF THINGS AT HOME, OR GET ALONG WITH OTHER PEOPLE: 1
9. THOUGHTS THAT YOU WOULD BE BETTER OFF DEAD, OR OF HURTING YOURSELF: 0
SUM OF ALL RESPONSES TO PHQ9 QUESTIONS 1 & 2: 1
6. FEELING BAD ABOUT YOURSELF - OR THAT YOU ARE A FAILURE OR HAVE LET YOURSELF OR YOUR FAMILY DOWN: 0
7. TROUBLE CONCENTRATING ON THINGS, SUCH AS READING THE NEWSPAPER OR WATCHING TELEVISION: 1
SUM OF ALL RESPONSES TO PHQ QUESTIONS 1-9: 9

## 2023-12-14 ASSESSMENT — COLUMBIA-SUICIDE SEVERITY RATING SCALE - C-SSRS
3. HAVE YOU BEEN THINKING ABOUT HOW YOU MIGHT KILL YOURSELF?: NO
4. HAVE YOU HAD THESE THOUGHTS AND HAD SOME INTENTION OF ACTING ON THEM?: NO
7. DID THIS OCCUR IN THE LAST THREE MONTHS: NO
5. HAVE YOU STARTED TO WORK OUT OR WORKED OUT THE DETAILS OF HOW TO KILL YOURSELF? DO YOU INTEND TO CARRY OUT THIS PLAN?: NO

## 2023-12-14 NOTE — PROGRESS NOTES
Behavioral Health Consultation  Adelaide Hernandez, Ph.D.  Psychologist  12/14/2023  8:03 AM      Time spent with Patient: 27 minutes  This is patient's second  [facility] appointment.    Reason for Consult:    Chief Complaint   Patient presents with    Anxiety       Feedback given to PCP.    S:  Pt seen for f/u of specific phobia. Pt reported variable mood and sxs. Did not board her flight in Oct,  drove her to airport and pt couldn't get out of the car. Last month, had to euthanize her dog (Errol), he didn't respond to chemo. He was 9yo, has a 16yo dog. Finds herself anxious about how much longer other has. They have had 10 wks of illness between HFM and RSV. They have decided to pull son out of . 's work schedule allows him to provide childcare in the AM, pt can take over after his nap.     Has noticed irritability, especially undeservedly, toward . Gets irritated if he does something differently than she would do it. Worries she is talking like her mom talks to others, which pt hates. Stated mom has \"narcissistic tendencies.\" Discussed irritability mgmnt.     In the process of getting CPAP, hoping she can do the nasal-only version. Poor onset due to laying in bed worrying, at least 1 hr onset. Been \"eating terrible and over eating, but I can't get out of a this funk. I want to eat healthier, but the motivation is just not there.\"       O:  MSE:    Appearance    alert, cooperative  Appetite abnormal: see above  Sleep disturbance Yes  Fatigue Yes  Loss of pleasure Yes  Impulsive behavior No  Speech    spontaneous, normal rate, normal volume, and well articulated  Mood    Anxious  Affect    anxiety  Thought Content    intact and cognitive distortions  Thought Process    linear, goal directed, and coherent  Associations    logical connections  Insight    Fair  Judgment    Intact  Orientation    oriented to person, place, time, and general circumstances  Memory    recent and remote memory

## 2024-01-11 ENCOUNTER — OFFICE VISIT (OUTPATIENT)
Dept: PSYCHOLOGY | Age: 39
End: 2024-01-11
Payer: COMMERCIAL

## 2024-01-11 DIAGNOSIS — F40.243 PHOBIA, FLYING: Primary | ICD-10-CM

## 2024-01-11 PROCEDURE — 90832 PSYTX W PT 30 MINUTES: CPT | Performed by: PSYCHOLOGIST

## 2024-01-11 ASSESSMENT — PATIENT HEALTH QUESTIONNAIRE - PHQ9
9. THOUGHTS THAT YOU WOULD BE BETTER OFF DEAD, OR OF HURTING YOURSELF: 0
6. FEELING BAD ABOUT YOURSELF - OR THAT YOU ARE A FAILURE OR HAVE LET YOURSELF OR YOUR FAMILY DOWN: 0
SUM OF ALL RESPONSES TO PHQ QUESTIONS 1-9: 6
8. MOVING OR SPEAKING SO SLOWLY THAT OTHER PEOPLE COULD HAVE NOTICED. OR THE OPPOSITE, BEING SO FIGETY OR RESTLESS THAT YOU HAVE BEEN MOVING AROUND A LOT MORE THAN USUAL: 1
3. TROUBLE FALLING OR STAYING ASLEEP: 2
SUM OF ALL RESPONSES TO PHQ QUESTIONS 1-9: 6
10. IF YOU CHECKED OFF ANY PROBLEMS, HOW DIFFICULT HAVE THESE PROBLEMS MADE IT FOR YOU TO DO YOUR WORK, TAKE CARE OF THINGS AT HOME, OR GET ALONG WITH OTHER PEOPLE: 1
SUM OF ALL RESPONSES TO PHQ QUESTIONS 1-9: 6
4. FEELING TIRED OR HAVING LITTLE ENERGY: 1
5. POOR APPETITE OR OVEREATING: 1
2. FEELING DOWN, DEPRESSED OR HOPELESS: 0
7. TROUBLE CONCENTRATING ON THINGS, SUCH AS READING THE NEWSPAPER OR WATCHING TELEVISION: 0
SUM OF ALL RESPONSES TO PHQ9 QUESTIONS 1 & 2: 1
SUM OF ALL RESPONSES TO PHQ QUESTIONS 1-9: 6
1. LITTLE INTEREST OR PLEASURE IN DOING THINGS: 1

## 2024-01-11 ASSESSMENT — ANXIETY QUESTIONNAIRES
7. FEELING AFRAID AS IF SOMETHING AWFUL MIGHT HAPPEN: 0
4. TROUBLE RELAXING: 1
2. NOT BEING ABLE TO STOP OR CONTROL WORRYING: 1
IF YOU CHECKED OFF ANY PROBLEMS ON THIS QUESTIONNAIRE, HOW DIFFICULT HAVE THESE PROBLEMS MADE IT FOR YOU TO DO YOUR WORK, TAKE CARE OF THINGS AT HOME, OR GET ALONG WITH OTHER PEOPLE: SOMEWHAT DIFFICULT
1. FEELING NERVOUS, ANXIOUS, OR ON EDGE: 1
3. WORRYING TOO MUCH ABOUT DIFFERENT THINGS: 1
GAD7 TOTAL SCORE: 5
5. BEING SO RESTLESS THAT IT IS HARD TO SIT STILL: 0
6. BECOMING EASILY ANNOYED OR IRRITABLE: 1

## 2024-01-11 NOTE — PATIENT INSTRUCTIONS
Improving Sleep Through Behavior Change     Stimulus Control Procedures     Go to Bed Only When You Are Sleepy   The longer you are in bed awake, the more the bed is associated with a place to be awake instead of asleep. Delay bedtime until sleepy. Don't get into bed just because it's \"bedtime.\"    Get Out of Bed If You Can’t Fall Asleep after 30 minutes OR  Get out of bed if you awaken during the night and can't fall back asleep after 20 minutes   Don't lie in bed trying to sleep. Instead, get out of bed and engage in a relaxing, quiet activity (e.g. Reading) until you feel DROWSY  then return to bed to attempt to sleep again.    Use the Bed for Sleep and Sex Only   Do not watch TV, listen to the radio, eat, or read in your bed or bedroom.     Sleep Hygiene Guidelines   Caffeine   Avoid caffeine 6 to 8 hours before bedtime. Caffeine disturbs sleep. Thus, drinking caffeinated beverages should be avoided near bedtime.     Nicotine   Avoid nicotine before bedtime. Nicotine can keep you awake. Avoid tobacco near bedtime and during the night.     Alcohol   Avoid alcohol after dinner. Alcohol often promotes the onset of sleep, but interrupts your natural sleep pattern. Do not consume it any closer than 4 hours before going to bed.     Sleeping Pills   Sleep medications are effective only temporarily. Sleep medications lose their effectiveness in about 2 to 4 weeks when taken regularly. Over time, sleeping pills actually can make sleep problems worse; withdrawal from the medication can lead to an insomnia rebound. Keep use of sleeping pills infrequent, but don’t worry if you need to use one on an occasional basis.     Regular Exercise   Do not exercise within 3 hours of bedtime. Exercise within 3 hours of sleep may interfere with your ability to fall asleep due to body temperature changes.    Baths/Showers  Baths can be a helpful relaxation activity. However, take the bath about 2 hours of bedtime. This, too, can

## 2024-01-11 NOTE — PROGRESS NOTES
Behavioral Health Consultation  Adelaide Hernandez, Ph.D.  Psychologist  1/11/2024  8:03 AM      Time spent with Patient: 25 minutes  This is patient's third  [facility] appointment.    Reason for Consult:    Chief Complaint   Patient presents with    Anxiety       Feedback given to PCP.    S:  Pt seen for f/u of specific phobia, anxiety. Pt reported improving mood and sxs. \"Maybe a little on edge sometimes, but not as much.\" Been using CPAP and is feeling more rested and more energetic. Also started using AG1 a few weeks ago and wonders if this is helping mood, knows GI sxs improving.  has pointed out less irritability. Sleep onset 30-60 mins w racing/reviewing the day thoughts and high amts of other stimuli in bed. Psychoed on stimulus reduction.       Made some new year goals that are less restrictive than previous years. Been starting to pay more attn to portion sizes, food choices. Would eventually like to consider adding exercise. Also wants to focus on communicating better w  and kids as she is disappointed in how critical her communication can be. Agreed to focus primarily on this next appt.     Next flight is in July, so this is not a priority currently.     O:  MSE:  Appearance    alert, cooperative  Appetite abnormal: see above  Sleep disturbance Yes  Fatigue Yes  Loss of pleasure Yes  Impulsive behavior No  Speech    spontaneous, normal rate, normal volume, and well articulated  Mood    Anxious  Affect    anxiety  Thought Content    intact and cognitive distortions  Thought Process    linear, goal directed, and coherent  Associations    logical connections  Insight    Fair  Judgment    Intact  Orientation    oriented to person, place, time, and general circumstances  Memory    recent and remote memory intact  Attention/Concentration    intact  Morbid ideation No  Suicide Assessment    no suicidal ideation    History:  Social History:   Social History     Socioeconomic History    Marital status:

## 2024-02-08 ENCOUNTER — OFFICE VISIT (OUTPATIENT)
Dept: PSYCHOLOGY | Age: 39
End: 2024-02-08
Payer: COMMERCIAL

## 2024-02-08 DIAGNOSIS — F41.1 GENERALIZED ANXIETY DISORDER WITH PANIC ATTACKS: Primary | ICD-10-CM

## 2024-02-08 DIAGNOSIS — F41.0 GENERALIZED ANXIETY DISORDER WITH PANIC ATTACKS: Primary | ICD-10-CM

## 2024-02-08 DIAGNOSIS — F40.243 PHOBIA, FLYING: ICD-10-CM

## 2024-02-08 PROCEDURE — 90832 PSYTX W PT 30 MINUTES: CPT | Performed by: PSYCHOLOGIST

## 2024-02-08 NOTE — PROGRESS NOTES
Behavioral Health Consultation  Adelaide Hernandez, Ph.D.  Psychologist  2/8/2024  8:07 AM      Time spent with Patient: 24 minutes  This is patient's fourth  [facility] appointment.    Reason for Consult:    Chief Complaint   Patient presents with    Anxiety       Feedback given to PCP.    S:  Pt seen for f/u of anxiety. Pt reported stable mood and sxs. Focused on communication dynamics. Mom is often critical, complains a lot, charges pt for periodic babysitting, does not take good care of her various medical issues. Parents  when pt as 16yo, mom's bx worsened after this.     Pt acknowledges she jumps to a negative, critical tone, taisha w . Underlying tone is something like \"what's wrong with you.\" Focused on ID'ing triggers for poor word choice, changing perspective.     O:  MSE:  Appearance    alert, cooperative  Appetite abnormal: see above  Sleep disturbance Yes  Fatigue Yes  Loss of pleasure Yes  Impulsive behavior No  Speech    spontaneous, normal rate, normal volume, and well articulated  Mood    Anxious  Affect    anxiety  Thought Content    intact and cognitive distortions  Thought Process    linear, goal directed, and coherent  Associations    logical connections  Insight    Fair  Judgment    Intact  Orientation    oriented to person, place, time, and general circumstances  Memory    recent and remote memory intact  Attention/Concentration    intact  Morbid ideation No  Suicide Assessment    no suicidal ideation       History:  Social History:   Social History     Socioeconomic History    Marital status:      Spouse name: Keon Balderrama    Number of children: 2    Years of education: Not on file    Highest education level: Not on file   Occupational History    Not on file   Tobacco Use    Smoking status: Former     Current packs/day: 0.00     Average packs/day: 0.3 packs/day for 13.0 years (3.3 ttl pk-yrs)     Types: Cigarettes     Start date: 2005     Quit date: 1/1/2018     Years since quitting:

## 2024-02-09 ENCOUNTER — OFFICE VISIT (OUTPATIENT)
Dept: PULMONOLOGY | Age: 39
End: 2024-02-09
Payer: COMMERCIAL

## 2024-02-09 VITALS
DIASTOLIC BLOOD PRESSURE: 76 MMHG | WEIGHT: 242.3 LBS | HEIGHT: 63 IN | OXYGEN SATURATION: 98 % | SYSTOLIC BLOOD PRESSURE: 122 MMHG | HEART RATE: 70 BPM | BODY MASS INDEX: 42.93 KG/M2

## 2024-02-09 DIAGNOSIS — G47.33 OSA ON CPAP: Primary | ICD-10-CM

## 2024-02-09 DIAGNOSIS — E66.01 MORBID OBESITY WITH BMI OF 40.0-44.9, ADULT (HCC): ICD-10-CM

## 2024-02-09 PROCEDURE — 99213 OFFICE O/P EST LOW 20 MIN: CPT | Performed by: STUDENT IN AN ORGANIZED HEALTH CARE EDUCATION/TRAINING PROGRAM

## 2024-02-09 ASSESSMENT — SLEEP AND FATIGUE QUESTIONNAIRES
HOW LIKELY ARE YOU TO NOD OFF OR FALL ASLEEP WHEN YOU ARE A PASSENGER IN A CAR FOR AN HOUR WITHOUT A BREAK: 1
HOW LIKELY ARE YOU TO NOD OFF OR FALL ASLEEP WHILE LYING DOWN TO REST IN THE AFTERNOON WHEN CIRCUMSTANCES PERMIT: 2
HOW LIKELY ARE YOU TO NOD OFF OR FALL ASLEEP WHILE SITTING AND TALKING TO SOMEONE: 0
ESS TOTAL SCORE: 4
HOW LIKELY ARE YOU TO NOD OFF OR FALL ASLEEP IN A CAR, WHILE STOPPED FOR A FEW MINUTES IN TRAFFIC: 0
HOW LIKELY ARE YOU TO NOD OFF OR FALL ASLEEP WHILE WATCHING TV: 1
HOW LIKELY ARE YOU TO NOD OFF OR FALL ASLEEP WHILE SITTING AND READING: 0
HOW LIKELY ARE YOU TO NOD OFF OR FALL ASLEEP WHILE SITTING QUIETLY AFTER LUNCH WITHOUT ALCOHOL: 0
HOW LIKELY ARE YOU TO NOD OFF OR FALL ASLEEP WHILE SITTING INACTIVE IN A PUBLIC PLACE: 0

## 2024-02-09 NOTE — PROGRESS NOTES
[facility]  Sleep Medicine  [address] [place] [address], [address]  [place] [address]    Chief Complaint   Patient presents with    Sleep Apnea         Char Balderrama comes in today for sleep apnea follow-up . She was diagnosed with moderate obstructive sleep apnea and is being treated with PAP therapy.   She has been on PAP therapy for a couple of months.  She states she wears the PAP device most nights. She falls asleep in  about 30 minutes.  She awakens 0-1 times per night and takes no naps. She does not use sleep aid/s but occasionally she might take tylenol PM. Symptoms of sleep apnea have improved since using PAP therapy.  She continues to have complaints of difficulty falling asleep.  She is not snoring with the machine on.  She denies any complaints of too high pressure, hunger for air, dry mouth / nose, mask fit / discomfort issues, low air humidity, high air humidity, temperature issues, and high/frequent leaks.   DME: [employer]  Mask: Dreamwear nasal pillow  Machine: ResMed Airsense 10 Autoset      DATA REVIEWED TODAY:    Diagnostic Review  HST on 11/10/2023 showed respiratory event index of 19.4/h with oxygen desaturation down to a karyn of 81%.     [place]           2024     8:35 AM 10/20/2023     9:05 AM   Sleep Medicine   Sitting and reading 0 1   Watching TV 1 1   Sitting, inactive in a public place (e.g. a theatre or a meeting) 0 1   As a passenger in a car for an hour without a break 1 1   Lying down to rest in the afternoon when circumstances permit 2 2   Sitting and talking to someone 0 0   Sitting quietly after a lunch without alcohol 0 1   In a car, while stopped for a few minutes in traffic 0 0   [place] Sleepiness Score 4 7   Neck circumference (Inches)  14.5       PAP Adherence (dates: 2023 - 2024)  Total days used: 42/48  % used days >= 4 hours: 85%  Average hours used per day: 6 hrs 53 mins  Mode: auto CPAP  Pressure settin-15 cmH2O  Average pressure (95%): 9.4

## 2024-02-09 NOTE — PATIENT INSTRUCTIONS
BHAVANA education:    You have obstructive sleep apnea (BHAVANA):    1. Obstructive sleep apnea (BHAVANA) is a condition where the upper airway narrows or closes intermittently during sleep. This can lead to drops in your oxygen levels during sleep, arousals from sleep, and excessive daytime sleepiness.     2. Untreated BHAVANA is associated with increased risk of hypertension, cardiac disease, myocardial infarction, stroke, and poor blood sugar control. Treating your BHAVANA can decrease these risks.    3. Weight loss does improve sleep apnea. It is important to have a healthy diet and an exercise program.     4. Alcohol and sedating medicine can make BHAVANA worse and should be avoided in particular before sleep.    5. If you have surgery or are hospitalized, tell your doctor that you have BHAVANA and bring your CPAP to the hospital.    6. If you are drowsy or sleepy, you should not drive. If you are driving and become drowsy or sleepy, you should pull over to a safe place. Below are our drowsy driving tips.     PAP machine care:   You should clean your PAP regularly (see your PAP  site for more details)  Daily cleaning   1. Wipe mask with a damp towel with mild detergent, rinse with a damp towel and let air dry. You can also see CPAP cleaning wipes. Avoid harsh cleaning wipes or chemicals.    2. Humidifier- empty water daily. Fill with clean distilled water before use before sleep.    Weekly Cleaning   1. Clean mask, headgear, and tubing weekly  2. Fill your sink with warm water and a few drops of mild dish soap. Swirl equipment for at least 5 minutes. Rinse well and air dry. Hang hose over something so the water droplets drip out.   3. Clean filter- rinse with warm water, squeeze excess water and blot dry with towel. If there is a white filter (respironics machine)- do not wash that - it is disposable and should be changed once a month.   4. Humidifier- Disinfect in solution that is one part vinegar and 5 parts water for 30

## 2024-02-27 ENCOUNTER — TELEPHONE (OUTPATIENT)
Dept: CARDIOLOGY CLINIC | Age: 39
End: 2024-02-27

## 2024-02-27 ENCOUNTER — HOSPITAL ENCOUNTER (OUTPATIENT)
Dept: CT IMAGING | Age: 39
Discharge: HOME | End: 2024-02-27
Payer: COMMERCIAL

## 2024-02-27 DIAGNOSIS — Q24.8 PERICARDIAL CYST: ICD-10-CM

## 2024-02-27 DIAGNOSIS — R16.0 HYPODENSE MASS OF RIGHT LOBE OF LIVER: Primary | ICD-10-CM

## 2024-02-27 PROCEDURE — 71270 CT THORAX DX C-/C+: CPT

## 2024-02-27 PROCEDURE — 6360000004 HC RX CONTRAST MEDICATION: Performed by: INTERNAL MEDICINE

## 2024-02-27 RX ADMIN — IOPAMIDOL 75 ML: 755 INJECTION, SOLUTION INTRAVENOUS at 07:45

## 2024-02-27 NOTE — TELEPHONE ENCOUNTER
Discussed with patient. She verbalized understanding. Order placed for MRI of abd w/wo contrast with hemangioma protocol. Central scheduling number given to patient.

## 2024-02-27 NOTE — TELEPHONE ENCOUNTER
----- Message from Niles Lu MD sent at 2/27/2024  9:49 AM EST -----  Recommend MRI of the abdomen with without contrast  using hemangioma protocol.    Cyst slightly larger. Will follow for now. Recheck 1 year.    Needs f/u appointment as well    GRAHAM

## 2024-03-05 ENCOUNTER — HOSPITAL ENCOUNTER (OUTPATIENT)
Dept: NON INVASIVE DIAGNOSTICS | Age: 39
Discharge: HOME | End: 2024-03-05
Payer: COMMERCIAL

## 2024-03-05 ENCOUNTER — OFFICE VISIT (OUTPATIENT)
Dept: CARDIOLOGY CLINIC | Age: 39
End: 2024-03-05
Payer: COMMERCIAL

## 2024-03-05 VITALS
WEIGHT: 243 LBS | SYSTOLIC BLOOD PRESSURE: 122 MMHG | HEIGHT: 63 IN | HEART RATE: 64 BPM | DIASTOLIC BLOOD PRESSURE: 64 MMHG | OXYGEN SATURATION: 95 % | BODY MASS INDEX: 43.05 KG/M2

## 2024-03-05 DIAGNOSIS — R16.0 LIVER MASS: ICD-10-CM

## 2024-03-05 DIAGNOSIS — Q24.8 PERICARDIAL CYST: Primary | ICD-10-CM

## 2024-03-05 DIAGNOSIS — Q24.8 PERICARDIAL CYST: ICD-10-CM

## 2024-03-05 PROCEDURE — 99214 OFFICE O/P EST MOD 30 MIN: CPT | Performed by: INTERNAL MEDICINE

## 2024-03-05 PROCEDURE — 93306 TTE W/DOPPLER COMPLETE: CPT

## 2024-03-05 NOTE — PROGRESS NOTES
[facility]  Cardiac Consult     Referring Provider:  Deborah King MD     Chief Complaint   Patient presents with    3 Month Follow-Up     F/u echo    Other     Pericardial cyst         History of Present Illness:   38 y.o. female seen in consultation at the request of Dr. King for a pericardial cyst.    She underwent CTPA in  for chest pain. No PE but pericardial cyst seen, 1.5 x 2.6 cm. She recently had a breast MRI that also showed the cyst, but it measured 4.1 cm. Repeat CT recommended. She had a CT last week with cyst measuring 2.2 x 3.6. Also 5 cm hepatic mass favoring hyperplasia over hemangioma. MRI recommended    She is doing well. Active without any issues. ECHO today normal and cyst cannot really be seen    Past Medical History:   has a past medical history of Abnormal Pap smear of cervix, Anxiety disorder, Depression, GERD (gastroesophageal reflux disease), Headache(784.0), Hypertension, Hypothyroid, and Obesity.    Surgical History:   has a past surgical history that includes knee surgery; Mouth surgery; Bladder surgery;  section (N/A, 2021); and Knee Arthroplasty.     Social History:  Social History     Tobacco Use    Smoking status: Former     Current packs/day: 0.00     Average packs/day: 0.3 packs/day for 13.0 years (3.3 ttl pk-yrs)     Types: Cigarettes     Start date:      Quit date: 2018     Years since quittin.1    Smokeless tobacco: Never   Substance Use Topics    Alcohol use: Not Currently     Comment: Drink socially, maybe once a month.        Family History:  family history includes Anxiety Disorder in her mother; Breast Cancer in her maternal aunt and paternal grandmother; Cancer in her maternal aunt, paternal grandfather, and paternal grandmother; Depression in her mother; Diabetes in her mother; High Blood Pressure in her father and mother; High Cholesterol in her father and mother; Obesity in her mother.     Allergies:  Bactrim

## 2024-03-08 ENCOUNTER — HOSPITAL ENCOUNTER (OUTPATIENT)
Dept: MRI IMAGING | Age: 39
Discharge: HOME | End: 2024-03-08
Attending: INTERNAL MEDICINE
Payer: COMMERCIAL

## 2024-03-08 DIAGNOSIS — R16.0 HYPODENSE MASS OF RIGHT LOBE OF LIVER: ICD-10-CM

## 2024-03-08 PROCEDURE — 2580000003 HC RX 258: Performed by: INTERNAL MEDICINE

## 2024-03-08 PROCEDURE — A9577 INJ MULTIHANCE: HCPCS | Performed by: INTERNAL MEDICINE

## 2024-03-08 PROCEDURE — 74183 MRI ABD W/O CNTR FLWD CNTR: CPT

## 2024-03-08 PROCEDURE — 6360000004 HC RX CONTRAST MEDICATION: Performed by: INTERNAL MEDICINE

## 2024-03-08 RX ORDER — SODIUM CHLORIDE 0.9 % (FLUSH) 0.9 %
5-40 SYRINGE (ML) INJECTION 2 TIMES DAILY
Status: DISCONTINUED | OUTPATIENT
Start: 2024-03-08 | End: 2024-03-09 | Stop reason: HOSPADM

## 2024-03-08 RX ADMIN — GADOBENATE DIMEGLUMINE 20 ML: 529 INJECTION, SOLUTION INTRAVENOUS at 19:02

## 2024-03-08 RX ADMIN — Medication 25 ML: at 19:02

## 2024-03-12 ENCOUNTER — TELEPHONE (OUTPATIENT)
Dept: CARDIOLOGY CLINIC | Age: 39
End: 2024-03-12

## 2024-03-12 NOTE — TELEPHONE ENCOUNTER
----- Message from Niles Lu MD sent at 3/11/2024  9:38 AM EDT -----  Tell her MRI shows 3 nodules. One the think is a hemangioma (Blood vessels) the other 2 likely hyperplasia. I doubt these are of concern but I would like her to see GI to be sure that we don not raciel to do anything else. They describe pericardial cyst similar size to 2020.    FLAVIOK

## 2024-03-12 NOTE — TELEPHONE ENCOUNTER
Spoke with patient. She sees Dr Mcdaniel [facility] routinely for GERD. She will call her to schedule appt to discuss. Faxed MRI report to [facility] per patient request.

## 2024-03-28 ENCOUNTER — OFFICE VISIT (OUTPATIENT)
Dept: PSYCHOLOGY | Age: 39
End: 2024-03-28
Payer: COMMERCIAL

## 2024-03-28 ENCOUNTER — OFFICE VISIT (OUTPATIENT)
Dept: INTERNAL MEDICINE CLINIC | Age: 39
End: 2024-03-28
Payer: COMMERCIAL

## 2024-03-28 VITALS
WEIGHT: 240.2 LBS | SYSTOLIC BLOOD PRESSURE: 104 MMHG | BODY MASS INDEX: 42.56 KG/M2 | HEART RATE: 78 BPM | OXYGEN SATURATION: 97 % | DIASTOLIC BLOOD PRESSURE: 80 MMHG | HEIGHT: 63 IN

## 2024-03-28 DIAGNOSIS — M79.89 MASS OF SOFT TISSUE OF RIGHT LOWER EXTREMITY: ICD-10-CM

## 2024-03-28 DIAGNOSIS — F41.1 GENERALIZED ANXIETY DISORDER WITH PANIC ATTACKS: Primary | ICD-10-CM

## 2024-03-28 DIAGNOSIS — F41.0 GENERALIZED ANXIETY DISORDER WITH PANIC ATTACKS: Primary | ICD-10-CM

## 2024-03-28 DIAGNOSIS — D18.03 HEMANGIOMA OF INTRA-ABDOMINAL STRUCTURE: ICD-10-CM

## 2024-03-28 DIAGNOSIS — G47.33 OSA ON CPAP: ICD-10-CM

## 2024-03-28 DIAGNOSIS — E03.9 HYPOTHYROIDISM, UNSPECIFIED TYPE: ICD-10-CM

## 2024-03-28 PROBLEM — R05.3 PERSISTENT COUGH: Status: RESOLVED | Noted: 2023-11-29 | Resolved: 2024-03-28

## 2024-03-28 PROCEDURE — 99214 OFFICE O/P EST MOD 30 MIN: CPT | Performed by: INTERNAL MEDICINE

## 2024-03-28 PROCEDURE — 90832 PSYTX W PT 30 MINUTES: CPT | Performed by: PSYCHOLOGIST

## 2024-03-28 RX ORDER — LEVOTHYROXINE SODIUM 0.07 MG/1
75 TABLET ORAL DAILY
Qty: 90 TABLET | Refills: 1 | Status: SHIPPED | OUTPATIENT
Start: 2024-03-28

## 2024-03-28 RX ORDER — SERTRALINE HYDROCHLORIDE 100 MG/1
100 TABLET, FILM COATED ORAL DAILY
Qty: 90 TABLET | Refills: 1 | Status: SHIPPED | OUTPATIENT
Start: 2024-03-28

## 2024-03-28 ASSESSMENT — ENCOUNTER SYMPTOMS
COUGH: 1
ABDOMINAL PAIN: 0
VOMITING: 0
SHORTNESS OF BREATH: 0
NAUSEA: 0
WHEEZING: 0
COLOR CHANGE: 0
CHEST TIGHTNESS: 0
BACK PAIN: 0
CONSTIPATION: 0
SORE THROAT: 0

## 2024-03-28 NOTE — PROGRESS NOTES
ASSESSMENT/PLAN:  1. Generalized anxiety disorder with panic attacks  Assessment & Plan:    feels higher dose of Zoloft has been helpful and reporting satisfactory relief of symptoms, will refill and continue same, she will continue follow-up with behavioral health for CBT and counseling, will reevaluate in 6 months and as needed  Orders:  -     sertraline (ZOLOFT) 100 MG tablet; Take 1 tablet by mouth daily, Disp-90 tablet, R-1Normal  2. Hypothyroidism, unspecified type  Comments:  Stable.  Assessment & Plan:    has been on current levothyroxine, denies any change in symptoms, most recent lab work at target goal, advised will continue same no need for labs this visit, and will update at her yearly physical in September  Orders:  -     levothyroxine (SYNTHROID) 75 MCG tablet; Take 1 tablet by mouth daily, Disp-90 tablet, R-1Normal  3. Hemangioma of left hepatic lobe  Assessment & Plan:    results of MRI reviewed and explained to patient, suggestive of benign hemangioma and possibly hyperplasia, she does have follow-up with GI next week whom she sees regularly for management of her acid reflux.  Changes appear to be benign and nonconcerning at this point  4. BHAVANA on CPAP  Assessment & Plan:    reports has been compliant with CPAP use except for last week when she had cold symptoms which made it hard to breathe through the CPAP on but now feeling better and is back using it regularly  5. Mass of soft tissue of right lower extremity  Assessment & Plan:    reassured patient to small subcutaneous nodule like lumps upper right thigh do not appear to be concerning and may resemble benign fibromas      Return in about 6 months (around 9/28/2024) for annual physical.     SUBJECTIVE  HPI:   Here for 3 months follow-up on anxiety and other chronic medical conditions, Zoloft dose was increased last visit 200 mg, reports has been working better and feels good.  She is still having residual symptoms from recent cold and flulike 
21-Aug-2018 13:40

## 2024-03-28 NOTE — PROGRESS NOTES
Behavioral Health Consultation  Adelaide Hernandez, Ph.D.  Psychologist  3/28/2024  10:54 AM      Time spent with Patient: 25 minutes  This is patient's fifth  [facility] appointment.    Reason for Consult:    Chief Complaint   Patient presents with    Anxiety       Feedback given to PCP.    S:  Pt seen for f/u of anxiety. Pt reported slightly worsened mood and sxs d/t family illness.  expects the house to consistently be clean and he does contribute, but it is not realistic. Noticed their communication was improving until stress of the household increased. She was able to slow down and think before she spoke, were doing a better job communicating ahead of time to plan things.      Flying in a few weeks to [place]. Explored adjusting cognitions prior and setting self up for success.     O:  MSE:  Appearance    alert, cooperative  Appetite abnormal: see above  Sleep disturbance Yes  Fatigue Yes  Loss of pleasure Yes  Impulsive behavior No  Speech    spontaneous, normal rate, normal volume, and well articulated  Mood    Anxious  Affect    anxiety  Thought Content    intact and cognitive distortions  Thought Process    linear, goal directed, and coherent  Associations    logical connections  Insight    Fair  Judgment    Intact  Orientation    oriented to person, place, time, and general circumstances  Memory    recent and remote memory intact  Attention/Concentration    intact  Morbid ideation No  Suicide Assessment    no suicidal ideation    History:  Social History:   Social History     Socioeconomic History    Marital status:      Spouse name: Keon Balderrama    Number of children: 2    Years of education: Not on file    Highest education level: Not on file   Occupational History    Not on file   Tobacco Use    Smoking status: Former     Current packs/day: 0.00     Average packs/day: 0.3 packs/day for 13.0 years (3.3 ttl pk-yrs)     Types: Cigarettes     Start date: 2005     Quit date: 1/1/2018     Years since

## 2024-03-28 NOTE — ASSESSMENT & PLAN NOTE
feels higher dose of Zoloft has been helpful and reporting satisfactory relief of symptoms, will refill and continue same, she will continue follow-up with behavioral health for CBT and counseling, will reevaluate in 6 months and as needed  
  has been on current levothyroxine, denies any change in symptoms, most recent lab work at target goal, advised will continue same no need for labs this visit, and will update at her yearly physical in September  
  reassured patient to small subcutaneous nodule like lumps upper right thigh do not appear to be concerning and may resemble benign fibromas  
  reports has been compliant with CPAP use except for last week when she had cold symptoms which made it hard to breathe through the CPAP on but now feeling better and is back using it regularly  
  results of MRI reviewed and explained to patient, suggestive of benign hemangioma and possibly hyperplasia, she does have follow-up with GI next week whom she sees regularly for management of her acid reflux.  Changes appear to be benign and nonconcerning at this point  
Yes

## 2024-04-02 ENCOUNTER — HOSPITAL ENCOUNTER (OUTPATIENT)
Dept: WOMENS IMAGING | Age: 39
Discharge: HOME | End: 2024-04-02
Payer: COMMERCIAL

## 2024-04-02 ENCOUNTER — HOSPITAL ENCOUNTER (OUTPATIENT)
Dept: MRI IMAGING | Age: 39
Discharge: HOME | End: 2024-04-02
Payer: COMMERCIAL

## 2024-04-02 VITALS — WEIGHT: 240 LBS | BODY MASS INDEX: 42.52 KG/M2 | HEIGHT: 63 IN

## 2024-04-02 DIAGNOSIS — Z91.89 AT HIGH RISK FOR BREAST CANCER: ICD-10-CM

## 2024-04-02 DIAGNOSIS — R92.8 ABNORMAL FINDING ON BREAST IMAGING: ICD-10-CM

## 2024-04-02 DIAGNOSIS — Z80.3 FAMILY HISTORY OF BREAST CANCER: ICD-10-CM

## 2024-04-02 PROCEDURE — G0279 TOMOSYNTHESIS, MAMMO: HCPCS

## 2024-04-16 ENCOUNTER — OFFICE VISIT (OUTPATIENT)
Dept: SURGERY | Age: 39
End: 2024-04-16
Payer: COMMERCIAL

## 2024-04-16 VITALS
BODY MASS INDEX: 43.05 KG/M2 | HEIGHT: 63 IN | OXYGEN SATURATION: 98 % | RESPIRATION RATE: 18 BRPM | HEART RATE: 72 BPM | WEIGHT: 243 LBS

## 2024-04-16 DIAGNOSIS — R92.8 ABNORMAL MRI, BREAST: ICD-10-CM

## 2024-04-16 DIAGNOSIS — Z80.3 FAMILY HISTORY OF BREAST CANCER: ICD-10-CM

## 2024-04-16 DIAGNOSIS — R92.8 ABNORMAL FINDING ON BREAST IMAGING: Primary | ICD-10-CM

## 2024-04-16 DIAGNOSIS — Z12.39 ENCOUNTER FOR SCREENING BREAST EXAMINATION: ICD-10-CM

## 2024-04-16 DIAGNOSIS — Z91.89 AT HIGH RISK FOR BREAST CANCER: ICD-10-CM

## 2024-04-16 PROCEDURE — 99214 OFFICE O/P EST MOD 30 MIN: CPT | Performed by: NURSE PRACTITIONER

## 2024-04-16 NOTE — PROGRESS NOTES
[facility]   Surgical Breast Oncology     Primary Care Provider: Deborah King MD    CC: High Risk Screening for Breast Cancer and abnormal breast imaging follow up    HPI:  Char Balderrama is a 39 y.o. woman here for routine follow up for high risk screening for breast cancer.  Overall doing well, has not breast related concerns today.  She states that she does perform routine self breast evaluations and has not noticed any new abnormalities such as masses, skin changes, color changes, nipple discharge, or changes to the nipple-areolar complex.      Family cancer history is significant for breast cancer.   She has not had a breast biopsy or breast problems in the past.        Diet: Regular, cooks most meals at home, fatty foods a few times per week  Alcohol: Rare  Exercise: Nothing routine currently.  Previously was riding a [employer] bike and doing strength training  Sleep: 7-8h/night   Caffeine: 112 ounce coffee every morning  Nicotine: Denies   with 2 children, 3-year-old daughter and 16-year-old son.  Sister age 32 getting  this weekend.    INTERVAL HISTORY:  Bilateral screening mammogram followed by bilateral diagnostic mammogram and right left breast ultrasound 3/87/23:   Probably benign asymmetries in bilateral breast seen on baseline mammogram.  No ultrasound correlate.  Short interval follow-up in 6 months with bilateral diagnostic mammogram.  No other concerning findings malignancy.  BI-RADS 3.    Bilateral breast MRI 9/18/2023:  Incidental finding of 4.1 cm nonenhancing pericardial cyst which has increased in size compared to CT chest in 2020, previously 2.6 cm.  There is a 0.7 cm linear non mass enhancement in the upper outer quadrant left breast at anterior to mid depth.  There is additional 2.8 cm non mass enhancement in the central lower right breast at mid depth.  Likely benign findings.  Short interval follow-up is recommended in 6 months given baseline breast MRI.  BI-RADS

## 2024-04-17 DIAGNOSIS — Z80.3 FAMILY HISTORY OF BREAST CANCER: ICD-10-CM

## 2024-04-17 DIAGNOSIS — Z12.39 BREAST CANCER SCREENING, HIGH RISK PATIENT: ICD-10-CM

## 2024-04-17 DIAGNOSIS — Z91.89 AT HIGH RISK FOR BREAST CANCER: Primary | ICD-10-CM

## 2024-04-25 ENCOUNTER — OFFICE VISIT (OUTPATIENT)
Dept: PSYCHOLOGY | Age: 39
End: 2024-04-25
Payer: COMMERCIAL

## 2024-04-25 DIAGNOSIS — F41.1 GENERALIZED ANXIETY DISORDER WITH PANIC ATTACKS: Primary | ICD-10-CM

## 2024-04-25 DIAGNOSIS — F41.0 GENERALIZED ANXIETY DISORDER WITH PANIC ATTACKS: Primary | ICD-10-CM

## 2024-04-25 PROCEDURE — 90832 PSYTX W PT 30 MINUTES: CPT | Performed by: PSYCHOLOGIST

## 2024-04-25 ASSESSMENT — PATIENT HEALTH QUESTIONNAIRE - PHQ9
2. FEELING DOWN, DEPRESSED OR HOPELESS: NOT AT ALL
1. LITTLE INTEREST OR PLEASURE IN DOING THINGS: SEVERAL DAYS
SUM OF ALL RESPONSES TO PHQ QUESTIONS 1-9: 10
SUM OF ALL RESPONSES TO PHQ QUESTIONS 1-9: 10
7. TROUBLE CONCENTRATING ON THINGS, SUCH AS READING THE NEWSPAPER OR WATCHING TELEVISION: SEVERAL DAYS
6. FEELING BAD ABOUT YOURSELF - OR THAT YOU ARE A FAILURE OR HAVE LET YOURSELF OR YOUR FAMILY DOWN: NOT AT ALL
8. MOVING OR SPEAKING SO SLOWLY THAT OTHER PEOPLE COULD HAVE NOTICED. OR THE OPPOSITE, BEING SO FIGETY OR RESTLESS THAT YOU HAVE BEEN MOVING AROUND A LOT MORE THAN USUAL: NOT AT ALL
SUM OF ALL RESPONSES TO PHQ9 QUESTIONS 1 & 2: 1
4. FEELING TIRED OR HAVING LITTLE ENERGY: NEARLY EVERY DAY
SUM OF ALL RESPONSES TO PHQ QUESTIONS 1-9: 10
9. THOUGHTS THAT YOU WOULD BE BETTER OFF DEAD, OR OF HURTING YOURSELF: NOT AT ALL
3. TROUBLE FALLING OR STAYING ASLEEP: MORE THAN HALF THE DAYS
SUM OF ALL RESPONSES TO PHQ QUESTIONS 1-9: 10
5. POOR APPETITE OR OVEREATING: NEARLY EVERY DAY
10. IF YOU CHECKED OFF ANY PROBLEMS, HOW DIFFICULT HAVE THESE PROBLEMS MADE IT FOR YOU TO DO YOUR WORK, TAKE CARE OF THINGS AT HOME, OR GET ALONG WITH OTHER PEOPLE: SOMEWHAT DIFFICULT

## 2024-04-25 ASSESSMENT — ANXIETY QUESTIONNAIRES
5. BEING SO RESTLESS THAT IT IS HARD TO SIT STILL: NOT AT ALL
3. WORRYING TOO MUCH ABOUT DIFFERENT THINGS: MORE THAN HALF THE DAYS
2. NOT BEING ABLE TO STOP OR CONTROL WORRYING: SEVERAL DAYS
4. TROUBLE RELAXING: SEVERAL DAYS
6. BECOMING EASILY ANNOYED OR IRRITABLE: NEARLY EVERY DAY
7. FEELING AFRAID AS IF SOMETHING AWFUL MIGHT HAPPEN: SEVERAL DAYS
GAD7 TOTAL SCORE: 9
1. FEELING NERVOUS, ANXIOUS, OR ON EDGE: SEVERAL DAYS
IF YOU CHECKED OFF ANY PROBLEMS ON THIS QUESTIONNAIRE, HOW DIFFICULT HAVE THESE PROBLEMS MADE IT FOR YOU TO DO YOUR WORK, TAKE CARE OF THINGS AT HOME, OR GET ALONG WITH OTHER PEOPLE: SOMEWHAT DIFFICULT

## 2024-04-25 NOTE — PROGRESS NOTES
Behavioral Health Consultation  Adelaide Hernandez, Ph.D.  Psychologist  4/25/2024  8:35 AM      Time spent with Patient: 27 minutes  This is patient's sixth  [facility] appointment.    Reason for Consult:    Chief Complaint   Patient presents with    Depression       Feedback given to PCP.    S:  Pt seen for f/u of BILLIE, specific phobia. Pt reported unchanged mood and sxs. Fully intended to go to flight to [facility], felt so physically ill the day before and morning of, she ended up cancelling flight and driving. \"It sucked, but I just couldn't do it.\" Has flight in July to [place], knows she cannot drive that distance. Asked  to fly w her \"he said we can't leave our kids orphaned if something happens,\" agreed to fly separately. Has asked one friend, can ask other friends if that one falls through.     O:  MSE:  Appearance    alert, cooperative  Appetite abnormal: see above  Sleep disturbance Yes  Fatigue Yes  Loss of pleasure Yes  Impulsive behavior No  Speech    spontaneous, normal rate, normal volume, and well articulated  Mood    Anxious  Affect    anxiety  Thought Content    intact and cognitive distortions  Thought Process    linear, goal directed, and coherent  Associations    logical connections  Insight    Fair  Judgment    Intact  Orientation    oriented to person, place, time, and general circumstances  Memory    recent and remote memory intact  Attention/Concentration    intact  Morbid ideation No  Suicide Assessment    no suicidal ideation       History:  Social History:   Social History     Socioeconomic History    Marital status:      Spouse name: Keon Balderrama    Number of children: 2    Years of education: Not on file    Highest education level: Not on file   Occupational History    Not on file   Tobacco Use    Smoking status: Former     Current packs/day: 0.00     Average packs/day: 0.3 packs/day for 13.0 years (3.3 ttl pk-yrs)     Types: Cigarettes     Start date: 2005     Quit date: 1/1/2018     Years

## 2024-05-30 ENCOUNTER — OFFICE VISIT (OUTPATIENT)
Dept: PSYCHOLOGY | Age: 39
End: 2024-05-30
Payer: COMMERCIAL

## 2024-05-30 DIAGNOSIS — F40.243 PHOBIA, FLYING: Primary | ICD-10-CM

## 2024-05-30 PROCEDURE — 90832 PSYTX W PT 30 MINUTES: CPT | Performed by: PSYCHOLOGIST

## 2024-05-30 ASSESSMENT — ANXIETY QUESTIONNAIRES
5. BEING SO RESTLESS THAT IT IS HARD TO SIT STILL: NOT AT ALL
2. NOT BEING ABLE TO STOP OR CONTROL WORRYING: NEARLY EVERY DAY
6. BECOMING EASILY ANNOYED OR IRRITABLE: MORE THAN HALF THE DAYS
4. TROUBLE RELAXING: SEVERAL DAYS
1. FEELING NERVOUS, ANXIOUS, OR ON EDGE: NEARLY EVERY DAY
GAD7 TOTAL SCORE: 12
3. WORRYING TOO MUCH ABOUT DIFFERENT THINGS: NEARLY EVERY DAY
IF YOU CHECKED OFF ANY PROBLEMS ON THIS QUESTIONNAIRE, HOW DIFFICULT HAVE THESE PROBLEMS MADE IT FOR YOU TO DO YOUR WORK, TAKE CARE OF THINGS AT HOME, OR GET ALONG WITH OTHER PEOPLE: SOMEWHAT DIFFICULT
7. FEELING AFRAID AS IF SOMETHING AWFUL MIGHT HAPPEN: NOT AT ALL

## 2024-05-30 ASSESSMENT — PATIENT HEALTH QUESTIONNAIRE - PHQ9
4. FEELING TIRED OR HAVING LITTLE ENERGY: MORE THAN HALF THE DAYS
SUM OF ALL RESPONSES TO PHQ QUESTIONS 1-9: 9
5. POOR APPETITE OR OVEREATING: MORE THAN HALF THE DAYS
2. FEELING DOWN, DEPRESSED OR HOPELESS: NOT AT ALL
10. IF YOU CHECKED OFF ANY PROBLEMS, HOW DIFFICULT HAVE THESE PROBLEMS MADE IT FOR YOU TO DO YOUR WORK, TAKE CARE OF THINGS AT HOME, OR GET ALONG WITH OTHER PEOPLE: SOMEWHAT DIFFICULT
6. FEELING BAD ABOUT YOURSELF - OR THAT YOU ARE A FAILURE OR HAVE LET YOURSELF OR YOUR FAMILY DOWN: NOT AT ALL
9. THOUGHTS THAT YOU WOULD BE BETTER OFF DEAD, OR OF HURTING YOURSELF: NOT AT ALL
8. MOVING OR SPEAKING SO SLOWLY THAT OTHER PEOPLE COULD HAVE NOTICED. OR THE OPPOSITE, BEING SO FIGETY OR RESTLESS THAT YOU HAVE BEEN MOVING AROUND A LOT MORE THAN USUAL: NOT AT ALL
SUM OF ALL RESPONSES TO PHQ QUESTIONS 1-9: 9
7. TROUBLE CONCENTRATING ON THINGS, SUCH AS READING THE NEWSPAPER OR WATCHING TELEVISION: SEVERAL DAYS
SUM OF ALL RESPONSES TO PHQ QUESTIONS 1-9: 9
SUM OF ALL RESPONSES TO PHQ9 QUESTIONS 1 & 2: 2
3. TROUBLE FALLING OR STAYING ASLEEP: MORE THAN HALF THE DAYS
1. LITTLE INTEREST OR PLEASURE IN DOING THINGS: MORE THAN HALF THE DAYS
SUM OF ALL RESPONSES TO PHQ QUESTIONS 1-9: 9

## 2024-05-30 NOTE — PATIENT INSTRUCTIONS
Progressive Muscle Relaxation (8 Muscle Groups)           For each muscle group, tense the muscles involved about 1/3 to 2/3 of the maximum tension possible (enough to feel tension but not any pain).  Hold in the tensed position for about 5 seconds, then let the muscles relax in their natural resting positions for about 20 seconds.      Both Arms:  Turn your palms up, then make a fist.  Bring your fists up to your shoulders while tensing the biceps.    Both Legs:  Lift both legs off of the ground, straighten your knees, and point your toes toward your head.    Abdomen:  Tighten these muscles as if you were about to be hit in the stomach.    Chest:  Take a very deep breath (through your upper chest, not your diaphragm) and hold it.    Shoulders:  Lift both shoulders up toward your ears.    Back of Neck:  Tuck in and lower your chin toward your chest.    Eyes:  Squint.     Forehead:  Raise your eyebrows.

## 2024-05-31 NOTE — PROGRESS NOTES
Sharoneverettmitchell Myrick received a viral test for COVID-19. They were educated on isolation and quarantine as appropriate. For any symptoms, they were directed to seek care from their PCP, given contact information to establish with a doctor, directed to an urgent care or the emergency room. You can access the FollowMyHealth Patient Portal offered by [facility] by registering at the following website: [email or web address]. By joining [facility]’s FollowMyHealth portal, you will also be able to view your health information using other applications (apps) compatible with our system.

## 2024-06-20 ENCOUNTER — OFFICE VISIT (OUTPATIENT)
Dept: PSYCHOLOGY | Age: 39
End: 2024-06-20
Payer: COMMERCIAL

## 2024-06-20 DIAGNOSIS — F40.298 SPECIFIC PHOBIA: Primary | ICD-10-CM

## 2024-06-20 PROCEDURE — 90832 PSYTX W PT 30 MINUTES: CPT | Performed by: PSYCHOLOGIST

## 2024-06-20 ASSESSMENT — ANXIETY QUESTIONNAIRES
GAD7 TOTAL SCORE: 12
3. WORRYING TOO MUCH ABOUT DIFFERENT THINGS: NEARLY EVERY DAY
2. NOT BEING ABLE TO STOP OR CONTROL WORRYING: NEARLY EVERY DAY
7. FEELING AFRAID AS IF SOMETHING AWFUL MIGHT HAPPEN: SEVERAL DAYS
1. FEELING NERVOUS, ANXIOUS, OR ON EDGE: NEARLY EVERY DAY
4. TROUBLE RELAXING: NOT AT ALL
IF YOU CHECKED OFF ANY PROBLEMS ON THIS QUESTIONNAIRE, HOW DIFFICULT HAVE THESE PROBLEMS MADE IT FOR YOU TO DO YOUR WORK, TAKE CARE OF THINGS AT HOME, OR GET ALONG WITH OTHER PEOPLE: SOMEWHAT DIFFICULT
5. BEING SO RESTLESS THAT IT IS HARD TO SIT STILL: NOT AT ALL
6. BECOMING EASILY ANNOYED OR IRRITABLE: MORE THAN HALF THE DAYS

## 2024-06-20 ASSESSMENT — PATIENT HEALTH QUESTIONNAIRE - PHQ9
SUM OF ALL RESPONSES TO PHQ9 QUESTIONS 1 & 2: 2
1. LITTLE INTEREST OR PLEASURE IN DOING THINGS: SEVERAL DAYS
4. FEELING TIRED OR HAVING LITTLE ENERGY: NEARLY EVERY DAY
3. TROUBLE FALLING OR STAYING ASLEEP: NEARLY EVERY DAY
7. TROUBLE CONCENTRATING ON THINGS, SUCH AS READING THE NEWSPAPER OR WATCHING TELEVISION: SEVERAL DAYS
9. THOUGHTS THAT YOU WOULD BE BETTER OFF DEAD, OR OF HURTING YOURSELF: NOT AT ALL
5. POOR APPETITE OR OVEREATING: NEARLY EVERY DAY
2. FEELING DOWN, DEPRESSED OR HOPELESS: SEVERAL DAYS
10. IF YOU CHECKED OFF ANY PROBLEMS, HOW DIFFICULT HAVE THESE PROBLEMS MADE IT FOR YOU TO DO YOUR WORK, TAKE CARE OF THINGS AT HOME, OR GET ALONG WITH OTHER PEOPLE: SOMEWHAT DIFFICULT
SUM OF ALL RESPONSES TO PHQ QUESTIONS 1-9: 12
6. FEELING BAD ABOUT YOURSELF - OR THAT YOU ARE A FAILURE OR HAVE LET YOURSELF OR YOUR FAMILY DOWN: NOT AT ALL
8. MOVING OR SPEAKING SO SLOWLY THAT OTHER PEOPLE COULD HAVE NOTICED. OR THE OPPOSITE, BEING SO FIGETY OR RESTLESS THAT YOU HAVE BEEN MOVING AROUND A LOT MORE THAN USUAL: NOT AT ALL
SUM OF ALL RESPONSES TO PHQ QUESTIONS 1-9: 12

## 2024-06-20 NOTE — PROGRESS NOTES
for 13.0 years (3.3 ttl pk-yrs)     Types: Cigarettes     Start date:      Quit date: 2018     Years since quittin.4    Smokeless tobacco: Never   Vaping Use    Vaping Use: Never used   Substance and Sexual Activity    Alcohol use: Not Currently     Comment: Drink socially, maybe once a month.    Drug use: No    Sexual activity: Yes     Partners: Male   Other Topics Concern    Not on file   Social History Narrative    Not on file     Social Determinants of Health     Financial Resource Strain: Low Risk  (2023)    Overall Financial Resource Strain (CARDIA)     Difficulty of Paying Living Expenses: Not hard at all   Food Insecurity: Not on file (2023)   Transportation Needs: Unknown (2023)    PRAPARE - Transportation     Lack of Transportation (Medical): Not on file     Lack of Transportation (Non-Medical): No   Physical Activity: Inactive (2023)    Exercise Vital Sign     Days of Exercise per Week: 0 days     Minutes of Exercise per Session: 0 min   Stress: Not on file   Social Connections: Not on file   Intimate Partner Violence: Not At Risk (2023)    Humiliation, Afraid, Rape, and Kick questionnaire     Fear of Current or Ex-Partner: No     Emotionally Abused: No     Physically Abused: No     Sexually Abused: No   Housing Stability: Unknown (2023)    Housing Stability Vital Sign     Unable to Pay for Housing in the Last Year: Not on file     Number of Places Lived in the Last Year: Not on file     Unstable Housing in the Last Year: No     TOBACCO:   reports that she quit smoking about 6 years ago. Her smoking use included cigarettes. She started smoking about 19 years ago. She has a 3.3 pack-year smoking history. She has never used smokeless tobacco.  ETOH:   reports that she does not currently use alcohol.    A:  Administered PHQ-9 (see below). Patient endorses moderate symptoms of depression. Denied SI/HI.         2024     3:00 PM 2024    12:37 PM 2024

## 2024-06-25 ENCOUNTER — PATIENT MESSAGE (OUTPATIENT)
Dept: INTERNAL MEDICINE CLINIC | Age: 39
End: 2024-06-25

## 2024-06-25 DIAGNOSIS — F41.0 GENERALIZED ANXIETY DISORDER WITH PANIC ATTACKS: ICD-10-CM

## 2024-06-25 DIAGNOSIS — F40.243 FEAR OF FLYING: ICD-10-CM

## 2024-06-25 DIAGNOSIS — F41.1 GENERALIZED ANXIETY DISORDER WITH PANIC ATTACKS: ICD-10-CM

## 2024-06-25 RX ORDER — ALPRAZOLAM 0.5 MG/1
TABLET ORAL
Qty: 4 TABLET | Refills: 0 | Status: SHIPPED | OUTPATIENT
Start: 2024-06-25 | End: 2024-06-25

## 2024-06-25 NOTE — TELEPHONE ENCOUNTER
From: Char Balderrama  To: Dr. Deborah King  Sent: 6/25/2024 1:14 PM EDT  Subject: Refill    Dr. David Singh asked that I message you for a refill of 4 Xanax for an upcoming airplane trip I have.

## 2024-08-27 ENCOUNTER — OFFICE VISIT (OUTPATIENT)
Dept: INTERNAL MEDICINE CLINIC | Age: 39
End: 2024-08-27
Payer: COMMERCIAL

## 2024-08-27 VITALS
HEIGHT: 63 IN | WEIGHT: 246.4 LBS | SYSTOLIC BLOOD PRESSURE: 128 MMHG | BODY MASS INDEX: 43.66 KG/M2 | TEMPERATURE: 98.6 F | DIASTOLIC BLOOD PRESSURE: 86 MMHG | OXYGEN SATURATION: 97 % | HEART RATE: 69 BPM

## 2024-08-27 DIAGNOSIS — J01.00 ACUTE NON-RECURRENT MAXILLARY SINUSITIS: Primary | ICD-10-CM

## 2024-08-27 PROCEDURE — 99213 OFFICE O/P EST LOW 20 MIN: CPT | Performed by: INTERNAL MEDICINE

## 2024-08-27 RX ORDER — AMOXICILLIN 500 MG/1
500 CAPSULE ORAL 3 TIMES DAILY
Qty: 21 CAPSULE | Refills: 0 | Status: SHIPPED | OUTPATIENT
Start: 2024-08-27 | End: 2024-09-03

## 2024-08-27 NOTE — PROGRESS NOTES
Chief Complaint   Patient presents with    URI     Pt is here with complaints of cough, congestion, sore throat, sinus pressure for about 12 days. Pt states her mom tested positive for the flu and also states she's lost her taste and smell. Pt states last night the left side of her head had a lot of pressure and has just not been getting better. Pt has tried OTC theraflu and other cold/flu medicine.      HPI  12 day history of upper respiratory symptoms including cough, sore throat, congestion.  She lost sense of smell and taste.    Since yesterday she has had sinus pressure on the left side of her face.    Cough is productive of thick, dark yellow sputum.        ROS  No known fevers  Neg for dyspnea    EXAM  /86   Pulse 69   Temp 98.6 °F (37 °C) (Oral)   Ht 1.6 m (5' 3\")   Wt 111.8 kg (246 lb 6.4 oz)   SpO2 97%   BMI 43.65 kg/m²    GEN: WN/WD  ENT: MMM, normal appearing OP, normal appearing TMs  NECK no palpable LAD  RESP Nl effort, CTAB  CV: RRR, no murmurs    A/P  1. Acute non-recurrent maxillary sinusitis  12 days of symptoms, now localizing to the left maxillary sinus.  Given the duration of illness, will treat with antibiotics.  She has previously tolerated amoxicillin.  RX sent to pharmacy. Advised to call if not improving within 48-72 hours or with any new concerns.  - amoxicillin (AMOXIL) 500 MG capsule; Take 1 capsule by mouth 3 times daily for 7 days  Dispense: 21 capsule; Refill: 0

## 2024-09-01 ENCOUNTER — PATIENT MESSAGE (OUTPATIENT)
Dept: INTERNAL MEDICINE CLINIC | Age: 39
End: 2024-09-01

## 2024-09-01 DIAGNOSIS — R05.1 ACUTE COUGH: Primary | ICD-10-CM

## 2024-09-06 RX ORDER — HYDROCODONE POLISTIREX AND CHLORPHENIRAMINE POLISTIREX 10; 8 MG/5ML; MG/5ML
5 SUSPENSION, EXTENDED RELEASE ORAL EVERY 12 HOURS PRN
Qty: 100 ML | Refills: 0 | Status: SHIPPED | OUTPATIENT
Start: 2024-09-06 | End: 2024-09-16

## 2024-10-07 ENCOUNTER — HOSPITAL ENCOUNTER (OUTPATIENT)
Dept: MRI IMAGING | Age: 39
Discharge: HOME | End: 2024-10-07
Payer: COMMERCIAL

## 2024-10-07 DIAGNOSIS — Z91.89 AT HIGH RISK FOR BREAST CANCER: ICD-10-CM

## 2024-10-07 DIAGNOSIS — Z80.3 FAMILY HISTORY OF BREAST CANCER: ICD-10-CM

## 2024-10-07 DIAGNOSIS — Z12.39 BREAST CANCER SCREENING, HIGH RISK PATIENT: ICD-10-CM

## 2024-10-07 PROCEDURE — A9577 INJ MULTIHANCE: HCPCS | Performed by: NURSE PRACTITIONER

## 2024-10-07 PROCEDURE — C8908 MRI W/O FOL W/CONT, BREAST,: HCPCS

## 2024-10-07 PROCEDURE — 6360000004 HC RX CONTRAST MEDICATION: Performed by: NURSE PRACTITIONER

## 2024-10-07 RX ADMIN — GADOBENATE DIMEGLUMINE 20 ML: 529 INJECTION, SOLUTION INTRAVENOUS at 09:32

## 2024-10-08 ENCOUNTER — TELEPHONE (OUTPATIENT)
Dept: SURGERY | Age: 39
End: 2024-10-08

## 2024-10-08 NOTE — TELEPHONE ENCOUNTER
----- Message from FEDE Gomez CNP sent at 10/8/2024 10:45 AM EDT -----  Please let patient know, MRI looks good, no new suspicious or concerning findings suggestive of malignancy.

## 2024-10-15 ENCOUNTER — OFFICE VISIT (OUTPATIENT)
Dept: SURGERY | Age: 39
End: 2024-10-15
Payer: COMMERCIAL

## 2024-10-15 VITALS
HEART RATE: 72 BPM | HEIGHT: 63 IN | DIASTOLIC BLOOD PRESSURE: 82 MMHG | SYSTOLIC BLOOD PRESSURE: 116 MMHG | OXYGEN SATURATION: 98 % | WEIGHT: 242 LBS | BODY MASS INDEX: 42.88 KG/M2

## 2024-10-15 DIAGNOSIS — Z12.39 ENCOUNTER FOR SCREENING BREAST EXAMINATION: ICD-10-CM

## 2024-10-15 DIAGNOSIS — R92.8 ABNORMAL MRI, BREAST: ICD-10-CM

## 2024-10-15 DIAGNOSIS — Z91.89 AT HIGH RISK FOR BREAST CANCER: ICD-10-CM

## 2024-10-15 DIAGNOSIS — Z80.3 FAMILY HISTORY OF BREAST CANCER: ICD-10-CM

## 2024-10-15 DIAGNOSIS — Z91.89 AT HIGH RISK FOR BREAST CANCER: Primary | ICD-10-CM

## 2024-10-15 DIAGNOSIS — R92.8 ABNORMAL FINDING ON BREAST IMAGING: Primary | ICD-10-CM

## 2024-10-15 DIAGNOSIS — R92.8 ABNORMAL FINDING ON BREAST IMAGING: ICD-10-CM

## 2024-10-15 PROCEDURE — 99214 OFFICE O/P EST MOD 30 MIN: CPT | Performed by: NURSE PRACTITIONER

## 2024-10-15 NOTE — PROGRESS NOTES
[facility]   Surgical Breast Oncology     Primary Care Provider: Deborah King MD    CC: High Risk Screening for Breast Cancer and abnormal breast imaging follow up    HPI:  Char Balderrama is a 39 y.o. woman here for routine follow up for high risk screening for breast cancer.  Overall doing well, has not breast related concerns today.  She states that she does perform routine self breast evaluations and has not noticed any new abnormalities such as masses, skin changes, color changes, nipple discharge, or changes to the nipple-areolar complex.      Family cancer history is significant for breast cancer.   She has not had a breast biopsy or breast problems in the past.        Diet: Regular, cooks most meals at home, fatty foods a few times per week  Alcohol: Rare  Exercise: Nothing routine currently.  Previously was riding a [employer] bike and doing strength training  Sleep: 7-8h/night   Caffeine: 112 ounce coffee every morning  Nicotine: Denies   with 2 children, 4-year-old daughter and 17-year-old son.  Sister age 32 getting  this weekend.    INTERVAL HISTORY:  Bilateral screening mammogram followed by bilateral diagnostic mammogram and right left breast ultrasound 3/87/23:   Probably benign asymmetries in bilateral breast seen on baseline mammogram.  No ultrasound correlate.  Short interval follow-up in 6 months with bilateral diagnostic mammogram.  No other concerning findings malignancy.  BI-RADS 3.    Bilateral breast MRI 9/18/2023:  Incidental finding of 4.1 cm nonenhancing pericardial cyst which has increased in size compared to CT chest in 2020, previously 2.6 cm.  There is a 0.7 cm linear non mass enhancement in the upper outer quadrant left breast at anterior to mid depth.  There is additional 2.8 cm non mass enhancement in the central lower right breast at mid depth.  Likely benign findings.  Short interval follow-up is recommended in 6 months given baseline breast MRI.  BI-RADS

## 2024-10-27 SDOH — HEALTH STABILITY: PHYSICAL HEALTH: ON AVERAGE, HOW MANY DAYS PER WEEK DO YOU ENGAGE IN MODERATE TO STRENUOUS EXERCISE (LIKE A BRISK WALK)?: 0 DAYS

## 2024-10-27 SDOH — HEALTH STABILITY: PHYSICAL HEALTH: ON AVERAGE, HOW MANY MINUTES DO YOU ENGAGE IN EXERCISE AT THIS LEVEL?: 0 MIN

## 2024-10-28 ENCOUNTER — OFFICE VISIT (OUTPATIENT)
Dept: PRIMARY CARE CLINIC | Age: 39
End: 2024-10-28
Payer: COMMERCIAL

## 2024-10-28 VITALS
WEIGHT: 246 LBS | DIASTOLIC BLOOD PRESSURE: 72 MMHG | HEIGHT: 63 IN | HEART RATE: 69 BPM | OXYGEN SATURATION: 98 % | SYSTOLIC BLOOD PRESSURE: 128 MMHG | BODY MASS INDEX: 43.59 KG/M2

## 2024-10-28 DIAGNOSIS — E78.00 HYPERCHOLESTEROLEMIA: ICD-10-CM

## 2024-10-28 DIAGNOSIS — K76.89 LIVER NODULE: ICD-10-CM

## 2024-10-28 DIAGNOSIS — F41.0 GENERALIZED ANXIETY DISORDER WITH PANIC ATTACKS: ICD-10-CM

## 2024-10-28 DIAGNOSIS — F41.1 GENERALIZED ANXIETY DISORDER WITH PANIC ATTACKS: ICD-10-CM

## 2024-10-28 DIAGNOSIS — E06.3 HYPOTHYROIDISM DUE TO HASHIMOTO THYROIDITIS: ICD-10-CM

## 2024-10-28 DIAGNOSIS — Z00.00 WELL ADULT EXAM: Primary | ICD-10-CM

## 2024-10-28 DIAGNOSIS — E66.01 MORBID OBESITY WITH BMI OF 40.0-44.9, ADULT: ICD-10-CM

## 2024-10-28 DIAGNOSIS — G47.33 OSA ON CPAP: ICD-10-CM

## 2024-10-28 PROBLEM — M79.89 MASS OF SOFT TISSUE OF RIGHT LOWER EXTREMITY: Status: RESOLVED | Noted: 2024-03-28 | Resolved: 2024-10-28

## 2024-10-28 PROBLEM — G47.10 HYPERSOMNIA: Status: RESOLVED | Noted: 2023-11-18 | Resolved: 2024-10-28

## 2024-10-28 PROBLEM — F40.298 SPECIFIC PHOBIA: Status: RESOLVED | Noted: 2024-06-20 | Resolved: 2024-10-28

## 2024-10-28 LAB
ALBUMIN SERPL-MCNC: 4.1 G/DL (ref 3.4–5)
ALBUMIN/GLOB SERPL: 2 {RATIO} (ref 1.1–2.2)
ALP SERPL-CCNC: 120 U/L (ref 40–129)
ALT SERPL-CCNC: 12 U/L (ref 10–40)
ANION GAP SERPL CALCULATED.3IONS-SCNC: 9 MMOL/L (ref 3–16)
AST SERPL-CCNC: 14 U/L (ref 15–37)
BILIRUB SERPL-MCNC: 0.3 MG/DL (ref 0–1)
BUN SERPL-MCNC: 22 MG/DL (ref 7–20)
CALCIUM SERPL-MCNC: 9.4 MG/DL (ref 8.3–10.6)
CHLORIDE SERPL-SCNC: 106 MMOL/L (ref 99–110)
CHOLEST SERPL-MCNC: 252 MG/DL (ref 0–199)
CO2 SERPL-SCNC: 25 MMOL/L (ref 21–32)
CREAT SERPL-MCNC: 0.6 MG/DL (ref 0.6–1.1)
GFR SERPLBLD CREATININE-BSD FMLA CKD-EPI: >90 ML/MIN/{1.73_M2}
GLUCOSE SERPL-MCNC: 80 MG/DL (ref 70–99)
HDLC SERPL-MCNC: 59 MG/DL (ref 40–60)
LDL CHOLESTEROL: 164 MG/DL
POTASSIUM SERPL-SCNC: 4.3 MMOL/L (ref 3.5–5.1)
PROT SERPL-MCNC: 6.2 G/DL (ref 6.4–8.2)
SODIUM SERPL-SCNC: 140 MMOL/L (ref 136–145)
TRIGL SERPL-MCNC: 143 MG/DL (ref 0–150)
TSH SERPL DL<=0.005 MIU/L-ACNC: 1.98 UIU/ML (ref 0.27–4.2)
VLDLC SERPL CALC-MCNC: 29 MG/DL

## 2024-10-28 PROCEDURE — 36415 COLL VENOUS BLD VENIPUNCTURE: CPT | Performed by: FAMILY MEDICINE

## 2024-10-28 PROCEDURE — 99395 PREV VISIT EST AGE 18-39: CPT | Performed by: FAMILY MEDICINE

## 2024-10-28 RX ORDER — PREDNISONE 10 MG/1
10 TABLET ORAL
COMMUNITY
Start: 2024-10-25

## 2024-10-28 RX ORDER — METHOCARBAMOL 500 MG/1
500 TABLET, FILM COATED ORAL 3 TIMES DAILY PRN
COMMUNITY
Start: 2024-10-25

## 2024-10-28 RX ORDER — MELOXICAM 15 MG/1
15 TABLET ORAL DAILY
COMMUNITY
Start: 2024-10-01

## 2024-10-28 SDOH — ECONOMIC STABILITY: FOOD INSECURITY: WITHIN THE PAST 12 MONTHS, YOU WORRIED THAT YOUR FOOD WOULD RUN OUT BEFORE YOU GOT MONEY TO BUY MORE.: NEVER TRUE

## 2024-10-28 SDOH — ECONOMIC STABILITY: INCOME INSECURITY: HOW HARD IS IT FOR YOU TO PAY FOR THE VERY BASICS LIKE FOOD, HOUSING, MEDICAL CARE, AND HEATING?: NOT HARD AT ALL

## 2024-10-28 SDOH — ECONOMIC STABILITY: FOOD INSECURITY: WITHIN THE PAST 12 MONTHS, THE FOOD YOU BOUGHT JUST DIDN'T LAST AND YOU DIDN'T HAVE MONEY TO GET MORE.: NEVER TRUE

## 2024-10-28 ASSESSMENT — ANXIETY QUESTIONNAIRES
4. TROUBLE RELAXING: SEVERAL DAYS
2. NOT BEING ABLE TO STOP OR CONTROL WORRYING: SEVERAL DAYS
GAD7 TOTAL SCORE: 6
5. BEING SO RESTLESS THAT IT IS HARD TO SIT STILL: NOT AT ALL
3. WORRYING TOO MUCH ABOUT DIFFERENT THINGS: SEVERAL DAYS
6. BECOMING EASILY ANNOYED OR IRRITABLE: MORE THAN HALF THE DAYS
1. FEELING NERVOUS, ANXIOUS, OR ON EDGE: SEVERAL DAYS
IF YOU CHECKED OFF ANY PROBLEMS ON THIS QUESTIONNAIRE, HOW DIFFICULT HAVE THESE PROBLEMS MADE IT FOR YOU TO DO YOUR WORK, TAKE CARE OF THINGS AT HOME, OR GET ALONG WITH OTHER PEOPLE: NOT DIFFICULT AT ALL
7. FEELING AFRAID AS IF SOMETHING AWFUL MIGHT HAPPEN: NOT AT ALL

## 2024-10-28 ASSESSMENT — PATIENT HEALTH QUESTIONNAIRE - PHQ9
SUM OF ALL RESPONSES TO PHQ QUESTIONS 1-9: 5
10. IF YOU CHECKED OFF ANY PROBLEMS, HOW DIFFICULT HAVE THESE PROBLEMS MADE IT FOR YOU TO DO YOUR WORK, TAKE CARE OF THINGS AT HOME, OR GET ALONG WITH OTHER PEOPLE: NOT DIFFICULT AT ALL
1. LITTLE INTEREST OR PLEASURE IN DOING THINGS: NOT AT ALL
SUM OF ALL RESPONSES TO PHQ9 QUESTIONS 1 & 2: 0
5. POOR APPETITE OR OVEREATING: MORE THAN HALF THE DAYS
9. THOUGHTS THAT YOU WOULD BE BETTER OFF DEAD, OR OF HURTING YOURSELF: NOT AT ALL
8. MOVING OR SPEAKING SO SLOWLY THAT OTHER PEOPLE COULD HAVE NOTICED. OR THE OPPOSITE, BEING SO FIGETY OR RESTLESS THAT YOU HAVE BEEN MOVING AROUND A LOT MORE THAN USUAL: NOT AT ALL
3. TROUBLE FALLING OR STAYING ASLEEP: SEVERAL DAYS
6. FEELING BAD ABOUT YOURSELF - OR THAT YOU ARE A FAILURE OR HAVE LET YOURSELF OR YOUR FAMILY DOWN: NOT AT ALL
7. TROUBLE CONCENTRATING ON THINGS, SUCH AS READING THE NEWSPAPER OR WATCHING TELEVISION: SEVERAL DAYS
SUM OF ALL RESPONSES TO PHQ QUESTIONS 1-9: 5
4. FEELING TIRED OR HAVING LITTLE ENERGY: SEVERAL DAYS
2. FEELING DOWN, DEPRESSED OR HOPELESS: NOT AT ALL
SUM OF ALL RESPONSES TO PHQ QUESTIONS 1-9: 5
SUM OF ALL RESPONSES TO PHQ QUESTIONS 1-9: 5

## 2024-10-28 ASSESSMENT — ENCOUNTER SYMPTOMS
COUGH: 0
ABDOMINAL PAIN: 0
SORE THROAT: 0
EYE PAIN: 0
SHORTNESS OF BREATH: 0

## 2024-10-28 NOTE — PROGRESS NOTES
Result Value Ref Range    RSV Rapid Ag POSITIVE (A) Negative   COVID-19    Specimen: Nasopharyngeal Swab   Result Value Ref Range    SARS-CoV-2 Not Detected Not detected        Current Outpatient Medications   Medication Sig Dispense Refill    predniSONE (DELTASONE) 10 MG tablet Take 1 tablet by mouth 6 tablets x 2 days, 5 x 2 days, 4 x 2 days, 3 x 2 days, 2 x 2 days, 1 x 2 days      meloxicam (MOBIC) 15 MG tablet Take 1 tablet by mouth daily      methocarbamol (ROBAXIN) 500 MG tablet Take 1 tablet by mouth 3 times daily as needed      sertraline (ZOLOFT) 100 MG tablet Take 1 tablet by mouth daily 90 tablet 1    levothyroxine (SYNTHROID) 75 MCG tablet Take 1 tablet by mouth daily 90 tablet 1    tretinoin (RETIN-A) 0.05 % cream       pantoprazole (PROTONIX) 40 MG tablet Take 1 tablet by mouth daily      Multiple Vitamins-Minerals (MULTIVITAMIN ADULTS PO) Take by mouth       No current facility-administered medications for this visit.      Social History     Socioeconomic History    Marital status:      Spouse name: Keon Balderrama    Number of children: 2    Years of education: Not on file    Highest education level: Not on file   Occupational History    Not on file   Tobacco Use    Smoking status: Former     Current packs/day: 0.00     Average packs/day: 0.3 packs/day for 13.0 years (3.3 ttl pk-yrs)     Types: Cigarettes     Start date:      Quit date: 2018     Years since quittin.8    Smokeless tobacco: Never   Vaping Use    Vaping status: Never Used   Substance and Sexual Activity    Alcohol use: Not Currently     Comment: Drink socially, maybe once a month.    Drug use: No    Sexual activity: Yes     Partners: Male   Other Topics Concern    Not on file   Social History Narrative    Not on file     Social Determinants of Health     Financial Resource Strain: Low Risk  (10/28/2024)    Overall Financial Resource Strain (CARDIA)     Difficulty of Paying Living Expenses: Not hard at all   Food

## 2024-10-29 ENCOUNTER — PATIENT MESSAGE (OUTPATIENT)
Dept: PRIMARY CARE CLINIC | Age: 39
End: 2024-10-29

## 2024-10-29 LAB
BASOPHILS # BLD: 0.1 K/UL (ref 0–0.2)
BASOPHILS NFR BLD: 1 %
DEPRECATED RDW RBC AUTO: 15.1 % (ref 12.4–15.4)
EOSINOPHIL # BLD: 0.1 K/UL (ref 0–0.6)
EOSINOPHIL NFR BLD: 1 %
EST. AVERAGE GLUCOSE BLD GHB EST-MCNC: 99.7 MG/DL
HBA1C MFR BLD: 5.1 %
HCT VFR BLD AUTO: 37.3 % (ref 36–48)
HGB BLD-MCNC: 12.4 G/DL (ref 12–16)
LYMPHOCYTES # BLD: 4.3 K/UL (ref 1–5.1)
LYMPHOCYTES NFR BLD: 36 %
MCH RBC QN AUTO: 29.3 PG (ref 26–34)
MCHC RBC AUTO-ENTMCNC: 33.3 G/DL (ref 31–36)
MCV RBC AUTO: 88 FL (ref 80–100)
METAMYELOCYTES NFR BLD MANUAL: 1 %
MONOCYTES # BLD: 0.5 K/UL (ref 0–1.3)
MONOCYTES NFR BLD: 5 %
MYELOCYTES NFR BLD MANUAL: 1 %
NEUTROPHILS # BLD: 5.4 K/UL (ref 1.7–7.7)
NEUTROPHILS NFR BLD: 49 %
NEUTS BAND NFR BLD MANUAL: 1 % (ref 0–7)
PLATELET # BLD AUTO: 340 K/UL (ref 135–450)
PLATELET BLD QL SMEAR: ADEQUATE
PMV BLD AUTO: 9.3 FL (ref 5–10.5)
RBC # BLD AUTO: 4.23 M/UL (ref 4–5.2)
RBC MORPH BLD: NORMAL
SLIDE REVIEW: ABNORMAL
VARIANT LYMPHS NFR BLD MANUAL: 5 % (ref 0–6)
WBC # BLD AUTO: 10.4 K/UL (ref 4–11)

## 2024-11-04 ENCOUNTER — PATIENT MESSAGE (OUTPATIENT)
Dept: PRIMARY CARE CLINIC | Age: 39
End: 2024-11-04

## 2024-11-04 DIAGNOSIS — R22.42 MASS OF LEFT LOWER LEG: Primary | ICD-10-CM

## 2024-11-08 ENCOUNTER — HOSPITAL ENCOUNTER (OUTPATIENT)
Dept: ULTRASOUND IMAGING | Age: 39
Discharge: HOME | End: 2024-11-08
Payer: COMMERCIAL

## 2024-11-08 DIAGNOSIS — R22.42 MASS OF LEFT LOWER LEG: ICD-10-CM

## 2024-11-08 PROCEDURE — 76882 US LMTD JT/FCL EVL NVASC XTR: CPT

## 2024-11-16 ENCOUNTER — HOSPITAL ENCOUNTER (OUTPATIENT)
Dept: MRI IMAGING | Age: 39
Discharge: HOME | End: 2024-11-16
Attending: INTERNAL MEDICINE
Payer: COMMERCIAL

## 2024-11-16 DIAGNOSIS — K76.9 HEPATIC LESION: ICD-10-CM

## 2024-11-16 PROCEDURE — 74183 MRI ABD W/O CNTR FLWD CNTR: CPT

## 2024-11-16 PROCEDURE — A9581 GADOXETATE DISODIUM INJ: HCPCS | Performed by: INTERNAL MEDICINE

## 2024-11-16 PROCEDURE — 6360000004 HC RX CONTRAST MEDICATION: Performed by: INTERNAL MEDICINE

## 2024-11-16 RX ADMIN — GADOXETATE DISODIUM 9 ML: 181.43 INJECTION, SOLUTION INTRAVENOUS at 10:48

## 2024-12-06 ENCOUNTER — TELEMEDICINE (OUTPATIENT)
Dept: PRIMARY CARE CLINIC | Age: 39
End: 2024-12-06

## 2024-12-06 DIAGNOSIS — J01.40 ACUTE NON-RECURRENT PANSINUSITIS: Primary | ICD-10-CM

## 2024-12-06 RX ORDER — FLUCONAZOLE 150 MG/1
150 TABLET ORAL ONCE
Qty: 1 TABLET | Refills: 0 | Status: SHIPPED | OUTPATIENT
Start: 2024-12-06 | End: 2024-12-06

## 2024-12-06 RX ORDER — GABAPENTIN 100 MG/1
100 CAPSULE ORAL 3 TIMES DAILY
COMMUNITY
Start: 2024-12-04

## 2024-12-06 ASSESSMENT — ENCOUNTER SYMPTOMS
SHORTNESS OF BREATH: 0
SINUS PRESSURE: 1
COUGH: 1
SINUS PAIN: 1
SORE THROAT: 1
WHEEZING: 0

## 2024-12-06 NOTE — PROGRESS NOTES
Char Balderrama (:  1985) is a 39 y.o. female,Established patient, here for evaluation of the following chief complaint(s):  Chest Congestion (X 1 week, No fever. Productive cough, thick congestion. Is having sinus pressure, body aches. Ears are ringing, )      SUBJECTIVE/OBJECTIVE:  HPI  Patient is here complaining of URI symptoms.    Symptoms started with sore throat before Thanksgiving. Last weekend, symptoms worsened and she had body aches, then cough started.     This week, cough is bothering her a lot - barking cough. Cough is worse at night, sleeping on the couch from keeping hsuband up. Sometimes productive, sometimes not. Phlegm is thick. She has sinus pain - frontal and maxillary.  Felt like she was getting better before, but now she is getting a lot worse.     Review of Systems   Constitutional:  Positive for chills and fatigue.   HENT:  Positive for congestion, postnasal drip, sinus pressure, sinus pain, sore throat and tinnitus.    Respiratory:  Positive for cough. Negative for shortness of breath and wheezing.    Neurological:  Positive for headaches.       There were no vitals taken for this visit.   Physical Exam  HENT:      Head: Normocephalic.      Nose: Congestion present.   Pulmonary:      Effort: Pulmonary effort is normal. No respiratory distress.   Psychiatric:         Mood and Affect: Mood normal.         Behavior: Behavior normal.         Thought Content: Thought content normal.         Judgment: Judgment normal.         Results for orders placed or performed in visit on 10/28/24   Hemoglobin A1C   Result Value Ref Range    Hemoglobin A1C 5.1 See comment %    Estimated Avg Glucose 99.7 mg/dL   Lipid, Fasting   Result Value Ref Range    Cholesterol, Fasting 252 (H) 0 - 199 mg/dL    Triglyceride, Fasting 143 0 - 150 mg/dL    HDL 59 40 - 60 mg/dL    LDL Cholesterol 164 (H) <100 mg/dL    VLDL Cholesterol Calculated 29 Not Established mg/dL   Comprehensive Metabolic Panel   Result Value

## 2024-12-27 DIAGNOSIS — F41.0 GENERALIZED ANXIETY DISORDER WITH PANIC ATTACKS: ICD-10-CM

## 2024-12-27 DIAGNOSIS — E03.9 HYPOTHYROIDISM, UNSPECIFIED TYPE: ICD-10-CM

## 2024-12-27 DIAGNOSIS — F41.1 GENERALIZED ANXIETY DISORDER WITH PANIC ATTACKS: ICD-10-CM

## 2024-12-27 RX ORDER — LEVOTHYROXINE SODIUM 75 UG/1
75 TABLET ORAL DAILY
Qty: 90 TABLET | Refills: 1 | OUTPATIENT
Start: 2024-12-27

## 2024-12-27 RX ORDER — SERTRALINE HYDROCHLORIDE 100 MG/1
100 TABLET, FILM COATED ORAL DAILY
Qty: 90 TABLET | Refills: 1 | OUTPATIENT
Start: 2024-12-27

## 2025-01-13 ENCOUNTER — PATIENT MESSAGE (OUTPATIENT)
Dept: PRIMARY CARE CLINIC | Age: 40
End: 2025-01-13

## 2025-01-13 DIAGNOSIS — F40.243 FEAR OF FLYING: Primary | ICD-10-CM

## 2025-01-13 RX ORDER — ALPRAZOLAM 0.5 MG
0.5 TABLET ORAL 3 TIMES DAILY PRN
Qty: 4 TABLET | Refills: 0 | Status: SHIPPED | OUTPATIENT
Start: 2025-01-13 | End: 2025-01-14

## 2025-02-03 DIAGNOSIS — F41.0 GENERALIZED ANXIETY DISORDER WITH PANIC ATTACKS: ICD-10-CM

## 2025-02-03 DIAGNOSIS — F41.1 GENERALIZED ANXIETY DISORDER WITH PANIC ATTACKS: ICD-10-CM

## 2025-02-03 DIAGNOSIS — E03.9 HYPOTHYROIDISM, UNSPECIFIED TYPE: ICD-10-CM

## 2025-02-03 RX ORDER — LEVOTHYROXINE SODIUM 75 UG/1
75 TABLET ORAL DAILY
Qty: 90 TABLET | Refills: 1 | OUTPATIENT
Start: 2025-02-03

## 2025-02-03 RX ORDER — SERTRALINE HYDROCHLORIDE 100 MG/1
100 TABLET, FILM COATED ORAL DAILY
Qty: 90 TABLET | Refills: 1 | OUTPATIENT
Start: 2025-02-03

## 2025-03-03 ENCOUNTER — TELEMEDICINE (OUTPATIENT)
Dept: PRIMARY CARE CLINIC | Age: 40
End: 2025-03-03
Payer: COMMERCIAL

## 2025-03-03 DIAGNOSIS — J18.9 ATYPICAL PNEUMONIA: Primary | ICD-10-CM

## 2025-03-03 PROCEDURE — 99214 OFFICE O/P EST MOD 30 MIN: CPT | Performed by: FAMILY MEDICINE

## 2025-03-03 RX ORDER — PREDNISONE 20 MG/1
20 TABLET ORAL 2 TIMES DAILY
Qty: 10 TABLET | Refills: 0 | Status: SHIPPED | OUTPATIENT
Start: 2025-03-03 | End: 2025-03-08

## 2025-03-03 RX ORDER — AZITHROMYCIN 250 MG/1
TABLET, FILM COATED ORAL
Qty: 6 TABLET | Refills: 0 | Status: SHIPPED | OUTPATIENT
Start: 2025-03-03 | End: 2025-03-13

## 2025-03-03 SDOH — ECONOMIC STABILITY: FOOD INSECURITY: WITHIN THE PAST 12 MONTHS, THE FOOD YOU BOUGHT JUST DIDN'T LAST AND YOU DIDN'T HAVE MONEY TO GET MORE.: NEVER TRUE

## 2025-03-03 SDOH — ECONOMIC STABILITY: FOOD INSECURITY: WITHIN THE PAST 12 MONTHS, YOU WORRIED THAT YOUR FOOD WOULD RUN OUT BEFORE YOU GOT MONEY TO BUY MORE.: NEVER TRUE

## 2025-03-03 ASSESSMENT — ENCOUNTER SYMPTOMS
EYE PAIN: 0
CHEST TIGHTNESS: 1
WHEEZING: 1
SHORTNESS OF BREATH: 0
NAUSEA: 1
COUGH: 1
SINUS PAIN: 0
ABDOMINAL PAIN: 0
SINUS PRESSURE: 0
VOMITING: 0
SORE THROAT: 1

## 2025-03-03 ASSESSMENT — PATIENT HEALTH QUESTIONNAIRE - PHQ9
2. FEELING DOWN, DEPRESSED OR HOPELESS: NOT AT ALL
10. IF YOU CHECKED OFF ANY PROBLEMS, HOW DIFFICULT HAVE THESE PROBLEMS MADE IT FOR YOU TO DO YOUR WORK, TAKE CARE OF THINGS AT HOME, OR GET ALONG WITH OTHER PEOPLE: NOT DIFFICULT AT ALL
1. LITTLE INTEREST OR PLEASURE IN DOING THINGS: NOT AT ALL
7. TROUBLE CONCENTRATING ON THINGS, SUCH AS READING THE NEWSPAPER OR WATCHING TELEVISION: NOT AT ALL
SUM OF ALL RESPONSES TO PHQ QUESTIONS 1-9: 0
5. POOR APPETITE OR OVEREATING: NOT AT ALL
SUM OF ALL RESPONSES TO PHQ QUESTIONS 1-9: 0
SUM OF ALL RESPONSES TO PHQ QUESTIONS 1-9: 0
4. FEELING TIRED OR HAVING LITTLE ENERGY: NOT AT ALL
8. MOVING OR SPEAKING SO SLOWLY THAT OTHER PEOPLE COULD HAVE NOTICED. OR THE OPPOSITE, BEING SO FIGETY OR RESTLESS THAT YOU HAVE BEEN MOVING AROUND A LOT MORE THAN USUAL: NOT AT ALL
9. THOUGHTS THAT YOU WOULD BE BETTER OFF DEAD, OR OF HURTING YOURSELF: NOT AT ALL
3. TROUBLE FALLING OR STAYING ASLEEP: NOT AT ALL
6. FEELING BAD ABOUT YOURSELF - OR THAT YOU ARE A FAILURE OR HAVE LET YOURSELF OR YOUR FAMILY DOWN: NOT AT ALL
SUM OF ALL RESPONSES TO PHQ QUESTIONS 1-9: 0

## 2025-03-03 NOTE — PROGRESS NOTES
ASSESSMENT/PLAN:  1. Atypical pneumonia  - symptoms likely started as viral, but now worsening with wheezing and chest tightness, will treat as atypical pneumonia with azithromycin  - prednisone for chest tightness and wheezing - denies h/o asthma  - f/u if symptoms worsen or fail to improve       No follow-ups on file.    Electronically signed by Lissette Tran MD on 3/3/2025 at 8:45 AM

## 2025-03-05 ENCOUNTER — OFFICE VISIT (OUTPATIENT)
Dept: CARDIOLOGY CLINIC | Age: 40
End: 2025-03-05
Payer: COMMERCIAL

## 2025-03-05 VITALS
OXYGEN SATURATION: 94 % | HEIGHT: 63 IN | HEART RATE: 78 BPM | SYSTOLIC BLOOD PRESSURE: 130 MMHG | WEIGHT: 241 LBS | BODY MASS INDEX: 42.7 KG/M2 | DIASTOLIC BLOOD PRESSURE: 72 MMHG

## 2025-03-05 DIAGNOSIS — D18.03 HEMANGIOMA OF INTRA-ABDOMINAL STRUCTURE: ICD-10-CM

## 2025-03-05 DIAGNOSIS — Q24.8 PERICARDIAL CYST: Primary | ICD-10-CM

## 2025-03-05 PROCEDURE — 99214 OFFICE O/P EST MOD 30 MIN: CPT | Performed by: INTERNAL MEDICINE

## 2025-03-05 NOTE — PROGRESS NOTES
% to 65 %.   The right ventricle is normal in size and systolic function.   Unable to visualized the pericardial cyst, no evidence of the cyst   encroaching/compressing an visible adjacent structure.   No significant valvular disease.   No evidence of any pericardial effusion.    MRI ABD 11/2024  1. Stable appearance of 3 hepatic lesions. The largest is a hemangioma. The  other 2 are likely focal nodular hyperplasia.  2. Hepatic steatosis.  3. Cholelithiasis.      ASSESSMENT:    Pericardial Cyst:  Asymptomatic. Size seems to vary. Repeat CT   No indication to remove unless symptoms or shows evidence of compression of surrounding structures.     Hepatic Mass  Hemangioma on MRI    Hypothyroid:  Continue synthroid    PLAN:   CT w/o cx for pericardial cyst  Plan f/u 1 year if stable.    Thank you for allowing me to participate in the care of this individual.      Niles Lu M.D., FACC

## 2025-03-06 DIAGNOSIS — Q24.8 PERICARDIAL CYST: Primary | ICD-10-CM

## 2025-03-16 DIAGNOSIS — F41.0 GENERALIZED ANXIETY DISORDER WITH PANIC ATTACKS: ICD-10-CM

## 2025-03-16 DIAGNOSIS — F41.1 GENERALIZED ANXIETY DISORDER WITH PANIC ATTACKS: ICD-10-CM

## 2025-03-17 ENCOUNTER — HOSPITAL ENCOUNTER (OUTPATIENT)
Dept: CT IMAGING | Age: 40
Discharge: HOME | End: 2025-03-17
Attending: INTERNAL MEDICINE
Payer: COMMERCIAL

## 2025-03-17 ENCOUNTER — PATIENT MESSAGE (OUTPATIENT)
Dept: PRIMARY CARE CLINIC | Age: 40
End: 2025-03-17

## 2025-03-17 DIAGNOSIS — F41.0 GENERALIZED ANXIETY DISORDER WITH PANIC ATTACKS: ICD-10-CM

## 2025-03-17 DIAGNOSIS — Q24.8 PERICARDIAL CYST: ICD-10-CM

## 2025-03-17 DIAGNOSIS — F41.1 GENERALIZED ANXIETY DISORDER WITH PANIC ATTACKS: ICD-10-CM

## 2025-03-17 PROCEDURE — 71250 CT THORAX DX C-: CPT

## 2025-03-17 RX ORDER — SERTRALINE HYDROCHLORIDE 100 MG/1
100 TABLET, FILM COATED ORAL DAILY
Qty: 90 TABLET | Refills: 1 | OUTPATIENT
Start: 2025-03-17

## 2025-03-18 ENCOUNTER — RESULTS FOLLOW-UP (OUTPATIENT)
Dept: CT IMAGING | Age: 40
End: 2025-03-18

## 2025-03-18 RX ORDER — SERTRALINE HYDROCHLORIDE 100 MG/1
100 TABLET, FILM COATED ORAL DAILY
Qty: 90 TABLET | Refills: 1 | Status: SHIPPED | OUTPATIENT
Start: 2025-03-18

## 2025-04-07 ENCOUNTER — PATIENT MESSAGE (OUTPATIENT)
Dept: PRIMARY CARE CLINIC | Age: 40
End: 2025-04-07

## 2025-04-07 DIAGNOSIS — H47.10 PAPILLEDEMA: Primary | ICD-10-CM

## 2025-04-14 NOTE — TELEPHONE ENCOUNTER
Mejia is calling with [facility] [facility] is calling requesting MRI Needs it in order to see JORGE LUIS       [facility] [employer] [facility]   [phone]  Please advise pt

## 2025-04-15 ENCOUNTER — TELEPHONE (OUTPATIENT)
Dept: PRIMARY CARE CLINIC | Age: 40
End: 2025-04-15

## 2025-04-15 DIAGNOSIS — H47.10 PAPILLEDEMA: Primary | ICD-10-CM

## 2025-04-15 NOTE — TELEPHONE ENCOUNTER
[facility] scheduling calling to request order be changed Mri brain if it is with Contrast it has to state with or without Contrast  please advise pt when it is changed

## 2025-04-16 ENCOUNTER — HOSPITAL ENCOUNTER (OUTPATIENT)
Dept: WOMENS IMAGING | Age: 40
Discharge: HOME | End: 2025-04-16
Payer: COMMERCIAL

## 2025-04-16 ENCOUNTER — OFFICE VISIT (OUTPATIENT)
Dept: SURGERY | Age: 40
End: 2025-04-16
Payer: COMMERCIAL

## 2025-04-16 VITALS
BODY MASS INDEX: 42.31 KG/M2 | WEIGHT: 238.8 LBS | HEART RATE: 83 BPM | HEIGHT: 63 IN | OXYGEN SATURATION: 97 % | RESPIRATION RATE: 18 BRPM

## 2025-04-16 VITALS — HEIGHT: 63 IN | BODY MASS INDEX: 42.52 KG/M2 | WEIGHT: 240 LBS

## 2025-04-16 DIAGNOSIS — Z12.39 ENCOUNTER FOR SCREENING BREAST EXAMINATION: ICD-10-CM

## 2025-04-16 DIAGNOSIS — R92.8 ABNORMAL FINDING ON BREAST IMAGING: Primary | ICD-10-CM

## 2025-04-16 DIAGNOSIS — Z80.3 FAMILY HISTORY OF BREAST CANCER: ICD-10-CM

## 2025-04-16 DIAGNOSIS — Z91.89 AT HIGH RISK FOR BREAST CANCER: ICD-10-CM

## 2025-04-16 DIAGNOSIS — R92.30 DENSE BREAST TISSUE: Primary | ICD-10-CM

## 2025-04-16 DIAGNOSIS — Z12.39 BREAST CANCER SCREENING, HIGH RISK PATIENT: ICD-10-CM

## 2025-04-16 DIAGNOSIS — R92.8 ABNORMAL FINDING ON BREAST IMAGING: ICD-10-CM

## 2025-04-16 DIAGNOSIS — R92.8 ABNORMAL MRI, BREAST: ICD-10-CM

## 2025-04-16 PROCEDURE — 99214 OFFICE O/P EST MOD 30 MIN: CPT | Performed by: NURSE PRACTITIONER

## 2025-04-16 PROCEDURE — 77063 BREAST TOMOSYNTHESIS BI: CPT

## 2025-04-16 NOTE — PROGRESS NOTES
documentation has been reviewed in the electronic medical record and patient office intake form.    REVIEW OF SYSTEMS:  Constitutional: Negative for fever  HENT: Negative for sore throat  Eyes: Negative for redness   Respiratory: Negative for dyspnea, cough  Cardiovascular: Negative for chest pain  Gastrointestinal: Negative for vomiting, diarrhea   Genitourinary: Negative for hematuria   Musculoskeletal: Negative for arthralgias   Skin: Negative for rash  Neurological: Negative for syncope  Hematological: Negative for adenopathy  Psychiatric/Behavorial: Negative for anxiety    PHYSICAL EXAM:  Pulse 83   Resp 18   Ht 1.6 m (5' 3\")   Wt 108.3 kg (238 lb 12.8 oz)   SpO2 97%   BMI 42.30 kg/m²   Constitutional: She appearswell-nourished. No apparent distress.  Breast: The patient was examined in the upright and supine position.  She has a \"DD\"cup breast. Breasts are symmetrically ptotic.        Right: No new masses or changes in breast contour.  No skin changes of the breast or nipple areolar complex.  No nipple inversion or discharge. No erythema, thickening (peau d'orange), or dimpling.        Left: No new masses or changes in breast contour.  No skin changes of the breast or nipple areolar complex.  No nipple inversion or discharge. No erythema, thickening (peau d'orange), or dimpling.   There is no axillary lymphadenopathy palpated bilaterally.   Neck: Neck supple. No tracheal deviation present. No obvious mass.  Pulmonary: No accessory muscle use.  Respirations non-labored and no wheezing.  Lymphatics: No palpable supraclavicular, cervical, or axillary lymphadenopathy  Skin: No rash noted. No erythema.   Neurologic: alert and oriented.  Extremities: appear well perfused.         Risk assessment using KEYANNA Breast Cancer Risk Evaluation Tool to evaluate her risk compared to the general population.  Her lifetime risk for breast cancer is 25.8% (general population average 13.2%).        ASSESSMENT:  - Abnormal

## 2025-04-21 DIAGNOSIS — H47.10 PAPILLEDEMA: ICD-10-CM

## 2025-04-22 ENCOUNTER — RESULTS FOLLOW-UP (OUTPATIENT)
Dept: PRIMARY CARE CLINIC | Age: 40
End: 2025-04-22

## 2025-04-22 LAB
ANION GAP SERPL CALCULATED.3IONS-SCNC: 11 MMOL/L (ref 3–16)
BUN SERPL-MCNC: 13 MG/DL (ref 7–20)
CALCIUM SERPL-MCNC: 9.5 MG/DL (ref 8.3–10.6)
CHLORIDE SERPL-SCNC: 104 MMOL/L (ref 99–110)
CO2 SERPL-SCNC: 24 MMOL/L (ref 21–32)
CREAT SERPL-MCNC: 0.6 MG/DL (ref 0.6–1.1)
GFR SERPLBLD CREATININE-BSD FMLA CKD-EPI: >90 ML/MIN/{1.73_M2}
GLUCOSE SERPL-MCNC: 86 MG/DL (ref 70–99)
POTASSIUM SERPL-SCNC: 4.5 MMOL/L (ref 3.5–5.1)
SODIUM SERPL-SCNC: 139 MMOL/L (ref 136–145)

## 2025-04-23 ENCOUNTER — PATIENT MESSAGE (OUTPATIENT)
Dept: PRIMARY CARE CLINIC | Age: 40
End: 2025-04-23

## 2025-04-23 DIAGNOSIS — F41.1 GENERALIZED ANXIETY DISORDER WITH PANIC ATTACKS: ICD-10-CM

## 2025-04-23 DIAGNOSIS — F41.0 GENERALIZED ANXIETY DISORDER WITH PANIC ATTACKS: ICD-10-CM

## 2025-04-23 DIAGNOSIS — E03.9 HYPOTHYROIDISM, UNSPECIFIED TYPE: ICD-10-CM

## 2025-04-23 RX ORDER — SERTRALINE HYDROCHLORIDE 100 MG/1
100 TABLET, FILM COATED ORAL DAILY
Qty: 90 TABLET | Refills: 3 | Status: SHIPPED | OUTPATIENT
Start: 2025-04-23

## 2025-04-23 RX ORDER — PANTOPRAZOLE SODIUM 40 MG/1
40 TABLET, DELAYED RELEASE ORAL DAILY
Qty: 90 TABLET | Refills: 3 | Status: SHIPPED | OUTPATIENT
Start: 2025-04-23

## 2025-04-23 RX ORDER — LEVOTHYROXINE SODIUM 75 UG/1
75 TABLET ORAL DAILY
Qty: 90 TABLET | Refills: 1 | OUTPATIENT
Start: 2025-04-23

## 2025-04-23 RX ORDER — LEVOTHYROXINE SODIUM 75 UG/1
75 TABLET ORAL DAILY
Qty: 90 TABLET | Refills: 3 | Status: SHIPPED | OUTPATIENT
Start: 2025-04-23

## 2025-04-24 ENCOUNTER — HOSPITAL ENCOUNTER (OUTPATIENT)
Dept: MRI IMAGING | Age: 40
Discharge: HOME | End: 2025-04-24
Payer: COMMERCIAL

## 2025-04-24 DIAGNOSIS — H47.10 PAPILLEDEMA: ICD-10-CM

## 2025-04-24 PROCEDURE — A9577 INJ MULTIHANCE: HCPCS | Performed by: FAMILY MEDICINE

## 2025-04-24 PROCEDURE — 70553 MRI BRAIN STEM W/O & W/DYE: CPT

## 2025-04-24 PROCEDURE — 6360000004 HC RX CONTRAST MEDICATION: Performed by: FAMILY MEDICINE

## 2025-04-24 RX ADMIN — GADOBENATE DIMEGLUMINE 20 ML: 529 INJECTION, SOLUTION INTRAVENOUS at 19:28

## 2025-04-27 ENCOUNTER — PATIENT MESSAGE (OUTPATIENT)
Dept: PRIMARY CARE CLINIC | Age: 40
End: 2025-04-27

## 2025-04-29 ENCOUNTER — RESULTS FOLLOW-UP (OUTPATIENT)
Dept: PRIMARY CARE CLINIC | Age: 40
End: 2025-04-29

## 2025-05-06 ASSESSMENT — SLEEP AND FATIGUE QUESTIONNAIRES
HOW LIKELY ARE YOU TO NOD OFF OR FALL ASLEEP WHILE WATCHING TV: WOULD NEVER DOZE
HOW LIKELY ARE YOU TO NOD OFF OR FALL ASLEEP WHILE SITTING QUIETLY AFTER LUNCH WITHOUT ALCOHOL: SLIGHT CHANCE OF DOZING
HOW LIKELY ARE YOU TO NOD OFF OR FALL ASLEEP WHEN YOU ARE A PASSENGER IN A CAR FOR AN HOUR WITHOUT A BREAK: WOULD NEVER DOZE
HOW LIKELY ARE YOU TO NOD OFF OR FALL ASLEEP WHILE SITTING AND READING: WOULD NEVER DOZE
HOW LIKELY ARE YOU TO NOD OFF OR FALL ASLEEP WHEN YOU ARE A PASSENGER IN A CAR FOR AN HOUR WITHOUT A BREAK: WOULD NEVER DOZE
HOW LIKELY ARE YOU TO NOD OFF OR FALL ASLEEP WHILE LYING DOWN TO REST IN THE AFTERNOON WHEN CIRCUMSTANCES PERMIT: HIGH CHANCE OF DOZING
HOW LIKELY ARE YOU TO NOD OFF OR FALL ASLEEP WHILE WATCHING TV: WOULD NEVER DOZE
HOW LIKELY ARE YOU TO NOD OFF OR FALL ASLEEP WHILE LYING DOWN TO REST IN THE AFTERNOON WHEN CIRCUMSTANCES PERMIT: HIGH CHANCE OF DOZING
HOW LIKELY ARE YOU TO NOD OFF OR FALL ASLEEP WHILE SITTING QUIETLY AFTER LUNCH WITHOUT ALCOHOL: SLIGHT CHANCE OF DOZING
HOW LIKELY ARE YOU TO NOD OFF OR FALL ASLEEP WHILE SITTING INACTIVE IN A PUBLIC PLACE: WOULD NEVER DOZE
HOW LIKELY ARE YOU TO NOD OFF OR FALL ASLEEP IN A CAR, WHILE STOPPED FOR A FEW MINUTES IN TRAFFIC: WOULD NEVER DOZE
HOW LIKELY ARE YOU TO NOD OFF OR FALL ASLEEP WHILE SITTING INACTIVE IN A PUBLIC PLACE: WOULD NEVER DOZE
HOW LIKELY ARE YOU TO NOD OFF OR FALL ASLEEP IN A CAR, WHILE STOPPED FOR A FEW MINUTES IN TRAFFIC: WOULD NEVER DOZE
ESS TOTAL SCORE: 4
HOW LIKELY ARE YOU TO NOD OFF OR FALL ASLEEP WHILE SITTING AND READING: WOULD NEVER DOZE
HOW LIKELY ARE YOU TO NOD OFF OR FALL ASLEEP WHILE SITTING AND TALKING TO SOMEONE: WOULD NEVER DOZE
HOW LIKELY ARE YOU TO NOD OFF OR FALL ASLEEP WHILE SITTING AND TALKING TO SOMEONE: WOULD NEVER DOZE

## 2025-05-07 ENCOUNTER — OFFICE VISIT (OUTPATIENT)
Dept: PULMONOLOGY | Age: 40
End: 2025-05-07
Payer: COMMERCIAL

## 2025-05-07 VITALS
OXYGEN SATURATION: 99 % | DIASTOLIC BLOOD PRESSURE: 78 MMHG | SYSTOLIC BLOOD PRESSURE: 118 MMHG | HEART RATE: 74 BPM | HEIGHT: 63 IN | BODY MASS INDEX: 41.75 KG/M2 | WEIGHT: 235.6 LBS

## 2025-05-07 DIAGNOSIS — E66.01 MORBID OBESITY WITH BMI OF 40.0-44.9, ADULT (HCC): ICD-10-CM

## 2025-05-07 DIAGNOSIS — G47.33 OSA ON CPAP: Primary | ICD-10-CM

## 2025-05-07 PROCEDURE — 99213 OFFICE O/P EST LOW 20 MIN: CPT | Performed by: STUDENT IN AN ORGANIZED HEALTH CARE EDUCATION/TRAINING PROGRAM

## 2025-05-07 NOTE — PROGRESS NOTES
1.3      Medications   She has a current medication list which includes the following prescription(s): levothyroxine, pantoprazole, sertraline, tretinoin, and multiple vitamins-minerals.    Review of Systems  A complete review of systems was negative except that noted in HPI     Physical Exam  Vitals:    05/07/25 1307   BP: 118/78   Pulse: 74   SpO2: 99%         Weight BMI   Wt Readings from Last 5 Encounters:   05/07/25 106.9 kg (235 lb 9.6 oz)   04/16/25 108.9 kg (240 lb)   04/16/25 108.3 kg (238 lb 12.8 oz)   03/05/25 109.3 kg (241 lb)   10/28/24 111.6 kg (246 lb)    BMI Readings from Last 5 Encounters:   05/07/25 41.73 kg/m²   04/16/25 42.51 kg/m²   04/16/25 42.30 kg/m²   03/05/25 42.70 kg/m²   10/28/24 43.58 kg/m²        GENERAL: alert and oriented, no apparent distress      ASSESSMENT/PLAN:  The primary encounter diagnosis was BHAVANA on CPAP. A diagnosis of Morbid obesity with BMI of 40.0-44.9, adult (HCC) was also pertinent to this visit.    No orders of the defined types were placed in this encounter.     No orders of the defined types were placed in this encounter.      Char Balderrama comes in today for follow up for moderate obstructive sleep apnea. She is currently on auto CPAP with following pressure/s: 5-15 cmH2O. As the PAP usage data suggests, she is not adherent and not benefiting from PAP therapy. She wants to consider alternative treatments.     BHAVANA: not on treatment.  Ample time was spent in discussing about alternative treatments, including Inspire therapy, MAD, weight loss with/without medication like tirzepatide.  Patient will think about this and get back to us with her plan.  She was provided with dentist contact list.  I discussed the importance of regular continued nightly use of the PAP machine.  She should never drive if drowsy and should pull over at a safe place if she becomes drowsy while driving.  She should avoid respiratory suppressants as they could worsen nocturnal hypoxemia and

## 2025-07-07 DIAGNOSIS — G47.33 MODERATE OBSTRUCTIVE SLEEP APNEA: ICD-10-CM

## 2025-07-07 DIAGNOSIS — E66.01 MORBID OBESITY WITH BMI OF 40.0-44.9, ADULT (HCC): ICD-10-CM

## 2025-07-08 ENCOUNTER — TELEPHONE (OUTPATIENT)
Dept: PULMONOLOGY | Age: 40
End: 2025-07-08

## 2025-07-08 DIAGNOSIS — G47.33 MODERATE OBSTRUCTIVE SLEEP APNEA: ICD-10-CM

## 2025-07-08 DIAGNOSIS — E66.01 MORBID OBESITY WITH BMI OF 40.0-44.9, ADULT (HCC): ICD-10-CM

## 2025-07-08 RX ORDER — SEMAGLUTIDE 0.25 MG/.5ML
INJECTION, SOLUTION SUBCUTANEOUS
Refills: 0 | OUTPATIENT
Start: 2025-07-08

## 2025-07-08 NOTE — TELEPHONE ENCOUNTER
Submitted PA for Zepbound 2.5MG/0.5ML pen-injectors  Via CMM Key: [id number] STATUS: PENDING.    Follow up done daily; if no decision with in three days we will refax.  If another three days goes by with no decision will call the insurance for status.

## 2025-07-08 NOTE — TELEPHONE ENCOUNTER
Pharmacy request PA for tirzepatide-weight management (ZEPBOUND) 2.5 MG/0.5ML SOAJ subCUTAneous auto-injector pen

## 2025-07-09 ENCOUNTER — TELEPHONE (OUTPATIENT)
Dept: ADMINISTRATIVE | Age: 40
End: 2025-07-09

## 2025-07-09 NOTE — TELEPHONE ENCOUNTER
Submitted PA for Wegovy 1MG/0.5ML auto-injectors  Via CMM Key: [id number] STATUS: not sent    We need a prescription placed to send in PA. Also will need DX code if this is not listed. This team cannot process a current PA without a prescription on the current med list.     If this requires a response please respond to the pool ( P [facility]X [facility] MEDICATION PRE-AUTH).       Thank you please advise patient.         Follow up done daily; if no decision with in three days we will refax.  If another three days goes by with no decision will call the insurance for status.

## 2025-07-17 NOTE — TELEPHONE ENCOUNTER
Fax received for an appeal for zepbound, form filled out, records sent, faxed back to [phone] and form scanned into the chart

## 2025-07-17 NOTE — TELEPHONE ENCOUNTER
The medication WEGOVY is APPROVED from 7/17/5 to 2/17/26.    Case ID # [id number].    Please notify the patient.    If this requires a response please respond to the pool ( P MHCX PSC MEDICATION PRE-AUTH).

## 2025-07-21 NOTE — TELEPHONE ENCOUNTER
PA for Wegovy not needed, the pharmacy keeps suggesting Wegovy in place of Zepbound but Wegovy is not covered for sleep apnea. Pharmacy was told to stop sending PA request.

## 2025-07-23 ENCOUNTER — PATIENT MESSAGE (OUTPATIENT)
Dept: PULMONOLOGY | Age: 40
End: 2025-07-23

## 2025-08-05 ENCOUNTER — TELEPHONE (OUTPATIENT)
Dept: PULMONOLOGY | Age: 40
End: 2025-08-05

## 2025-08-22 ENCOUNTER — OFFICE VISIT (OUTPATIENT)
Dept: PRIMARY CARE CLINIC | Age: 40
End: 2025-08-22

## 2025-08-22 VITALS
BODY MASS INDEX: 42.69 KG/M2 | HEART RATE: 77 BPM | SYSTOLIC BLOOD PRESSURE: 106 MMHG | DIASTOLIC BLOOD PRESSURE: 80 MMHG | WEIGHT: 241 LBS | OXYGEN SATURATION: 99 %

## 2025-08-22 DIAGNOSIS — N92.6 IRREGULAR MENSES: ICD-10-CM

## 2025-08-22 DIAGNOSIS — Z00.00 WELL ADULT EXAM: Primary | ICD-10-CM

## 2025-08-22 DIAGNOSIS — J02.9 PHARYNGITIS, UNSPECIFIED ETIOLOGY: ICD-10-CM

## 2025-08-22 DIAGNOSIS — E06.3 HYPOTHYROIDISM DUE TO HASHIMOTO THYROIDITIS: ICD-10-CM

## 2025-08-22 DIAGNOSIS — E66.01 MORBID OBESITY WITH BMI OF 40.0-44.9, ADULT (HCC): ICD-10-CM

## 2025-08-22 DIAGNOSIS — E78.00 HYPERCHOLESTEROLEMIA: ICD-10-CM

## 2025-08-22 DIAGNOSIS — G47.33 OSA ON CPAP: ICD-10-CM

## 2025-08-22 LAB
ALBUMIN SERPL-MCNC: 4.2 G/DL (ref 3.4–5)
ALBUMIN/GLOB SERPL: 1.8 {RATIO} (ref 1.1–2.2)
ALP SERPL-CCNC: 132 U/L (ref 40–129)
ALT SERPL-CCNC: 13 U/L (ref 10–40)
ANION GAP SERPL CALCULATED.3IONS-SCNC: 10 MMOL/L (ref 3–16)
AST SERPL-CCNC: 16 U/L (ref 15–37)
BASOPHILS # BLD: 0.1 K/UL (ref 0–0.2)
BASOPHILS NFR BLD: 0.6 %
BILIRUB SERPL-MCNC: 0.4 MG/DL (ref 0–1)
BUN SERPL-MCNC: 14 MG/DL (ref 7–20)
CALCIUM SERPL-MCNC: 9.2 MG/DL (ref 8.3–10.6)
CHLORIDE SERPL-SCNC: 104 MMOL/L (ref 99–110)
CHOLEST SERPL-MCNC: 221 MG/DL (ref 0–199)
CO2 SERPL-SCNC: 25 MMOL/L (ref 21–32)
CREAT SERPL-MCNC: 0.6 MG/DL (ref 0.6–1.1)
DEPRECATED RDW RBC AUTO: 14.2 % (ref 12.4–15.4)
EOSINOPHIL # BLD: 0.2 K/UL (ref 0–0.6)
EOSINOPHIL NFR BLD: 2.4 %
EST. AVERAGE GLUCOSE BLD GHB EST-MCNC: 108.3 MG/DL
FSH SERPL-ACNC: 3.5 MIU/ML
GFR SERPLBLD CREATININE-BSD FMLA CKD-EPI: >90 ML/MIN/{1.73_M2}
GLUCOSE SERPL-MCNC: 88 MG/DL (ref 70–99)
HBA1C MFR BLD: 5.4 %
HCT VFR BLD AUTO: 37.2 % (ref 36–48)
HDLC SERPL-MCNC: 45 MG/DL (ref 40–60)
HGB BLD-MCNC: 12.7 G/DL (ref 12–16)
LDL CHOLESTEROL: 143 MG/DL
LYMPHOCYTES # BLD: 2.5 K/UL (ref 1–5.1)
LYMPHOCYTES NFR BLD: 30 %
MCH RBC QN AUTO: 29.4 PG (ref 26–34)
MCHC RBC AUTO-ENTMCNC: 34 G/DL (ref 31–36)
MCV RBC AUTO: 86.3 FL (ref 80–100)
MONOCYTES # BLD: 0.3 K/UL (ref 0–1.3)
MONOCYTES NFR BLD: 4 %
NEUTROPHILS # BLD: 5.2 K/UL (ref 1.7–7.7)
NEUTROPHILS NFR BLD: 63 %
PLATELET # BLD AUTO: 343 K/UL (ref 135–450)
PMV BLD AUTO: 9.1 FL (ref 5–10.5)
POTASSIUM SERPL-SCNC: 4.9 MMOL/L (ref 3.5–5.1)
PROLACTIN SERPL IA-MCNC: 11.2 NG/ML
PROT SERPL-MCNC: 6.6 G/DL (ref 6.4–8.2)
RBC # BLD AUTO: 4.31 M/UL (ref 4–5.2)
S PYO AG THROAT QL: NORMAL
SODIUM SERPL-SCNC: 139 MMOL/L (ref 136–145)
TRIGL SERPL-MCNC: 163 MG/DL (ref 0–150)
TSH SERPL DL<=0.005 MIU/L-ACNC: 1.83 UIU/ML (ref 0.27–4.2)
VLDLC SERPL CALC-MCNC: 33 MG/DL
WBC # BLD AUTO: 8.2 K/UL (ref 4–11)

## 2025-08-22 ASSESSMENT — LIFESTYLE VARIABLES
HOW MANY STANDARD DRINKS CONTAINING ALCOHOL DO YOU HAVE ON A TYPICAL DAY: 1 OR 2
HOW OFTEN DO YOU HAVE A DRINK CONTAINING ALCOHOL: MONTHLY OR LESS

## 2025-08-22 ASSESSMENT — ENCOUNTER SYMPTOMS
SHORTNESS OF BREATH: 0
ABDOMINAL PAIN: 0
SORE THROAT: 1
EYE PAIN: 0
COUGH: 0

## (undated) DEVICE — PACK PROCEDURE SURG C SECT REV 1

## (undated) DEVICE — PENCIL SMK EVAC L10FT TBNG NONSTICK ESU BLDE PLUMEPEN ELITE

## (undated) DEVICE — NON-ADHERENT PAD: Brand: TELFA

## (undated) DEVICE — SUTURE VCRL SZ 2-0 L36IN ABSRB UD L36MM CT-1 1/2 CIR J945H

## (undated) DEVICE — GARMENT,MEDLINE,DVT,INT,CALF,MED, GEN2: Brand: MEDLINE

## (undated) DEVICE — BANDAGE COMPR W3INXL5YD COT ZN OXIDE TAN E ADH HVYWT

## (undated) DEVICE — Z CONVERTED USE 2275207 CLOTH PREP W7.5XL7.5IN 2% CHG SKIN ALC AND RNS FREE

## (undated) DEVICE — GLOVE SURG SZ 65 THK91MIL LTX FREE SYN POLYISOPRENE

## (undated) DEVICE — BLADE CLIPPER GEN PURP NS

## (undated) DEVICE — 3M™ STERI-STRIP™ COMPOUND BENZOIN TINCTURE 40 BAGS/CARTON 4 CARTONS/CASE C1544: Brand: 3M™ STERI-STRIP™

## (undated) DEVICE — SUTURE COAT VCRL SZ 0 L36IN ABSRB VLT CTX L48MM TAPERPOINT J370H

## (undated) DEVICE — 9165 UNIVERSAL PATIENT PLATE: Brand: 3M™

## (undated) DEVICE — 3M™ STERI-STRIP™ REINFORCED ADHESIVE SKIN CLOSURES, R1549, 1/2 IN X 2 IN (12 MM X 50 MM), 6 STRIPS/ENVELOPE: Brand: 3M™ STERI-STRIP™

## (undated) DEVICE — TRAY URIN CATH 16FR DRNGE BG STATLOK STBL DEV F SURSTP

## (undated) DEVICE — APPLICATOR MEDICATED 26 CC SOLUTION HI LT ORNG CHLORAPREP

## (undated) DEVICE — STAPLER SKIN SQ 30 ABSRB STPL DISP INSORB

## (undated) DEVICE — WOUND RETRACTOR AND PROTECTOR: Brand: ALEXIS WOUND PROTECTOR-RETRACTOR